# Patient Record
Sex: FEMALE | Race: WHITE | NOT HISPANIC OR LATINO | Employment: OTHER | URBAN - METROPOLITAN AREA
[De-identification: names, ages, dates, MRNs, and addresses within clinical notes are randomized per-mention and may not be internally consistent; named-entity substitution may affect disease eponyms.]

---

## 2020-05-03 ENCOUNTER — HOSPITAL ENCOUNTER (OUTPATIENT)
Facility: HOSPITAL | Age: 70
Setting detail: OBSERVATION
Discharge: ANOTHER HOSPITAL | End: 2020-05-04
Attending: EMERGENCY MEDICINE | Admitting: INTERNAL MEDICINE

## 2020-05-03 ENCOUNTER — APPOINTMENT (OUTPATIENT)
Dept: CT IMAGING | Facility: HOSPITAL | Age: 70
End: 2020-05-03

## 2020-05-03 DIAGNOSIS — Q62.11 HYDRONEPHROSIS WITH URETEROPELVIC JUNCTION (UPJ) OBSTRUCTION: ICD-10-CM

## 2020-05-03 DIAGNOSIS — N20.1 CALCULUS OF URETER: Primary | ICD-10-CM

## 2020-05-03 LAB
ALBUMIN SERPL-MCNC: 4.1 G/DL (ref 3.5–5.2)
ALBUMIN/GLOB SERPL: 1.5 G/DL
ALP SERPL-CCNC: 67 U/L (ref 39–117)
ALT SERPL W P-5'-P-CCNC: 10 U/L (ref 1–33)
ANION GAP SERPL CALCULATED.3IONS-SCNC: 12 MMOL/L (ref 5–15)
AST SERPL-CCNC: 14 U/L (ref 1–32)
BILIRUB SERPL-MCNC: 0.5 MG/DL (ref 0.2–1.2)
BUN BLD-MCNC: 14 MG/DL (ref 8–23)
BUN/CREAT SERPL: 20.6 (ref 7–25)
CALCIUM SPEC-SCNC: 9.2 MG/DL (ref 8.6–10.5)
CHLORIDE SERPL-SCNC: 106 MMOL/L (ref 98–107)
CO2 SERPL-SCNC: 26 MMOL/L (ref 22–29)
CREAT BLD-MCNC: 0.68 MG/DL (ref 0.57–1)
DEPRECATED RDW RBC AUTO: 46.4 FL (ref 37–54)
EOSINOPHIL # BLD MANUAL: 0.09 10*3/MM3 (ref 0–0.4)
EOSINOPHIL NFR BLD MANUAL: 1 % (ref 0.3–6.2)
ERYTHROCYTE [DISTWIDTH] IN BLOOD BY AUTOMATED COUNT: 14.9 % (ref 12.3–15.4)
GFR SERPL CREATININE-BSD FRML MDRD: 85 ML/MIN/1.73
GLOBULIN UR ELPH-MCNC: 2.8 GM/DL
GLUCOSE BLD-MCNC: 115 MG/DL (ref 65–99)
HCT VFR BLD AUTO: 42.1 % (ref 34–46.6)
HGB BLD-MCNC: 14.5 G/DL (ref 12–15.9)
LIPASE SERPL-CCNC: 26 U/L (ref 13–60)
LYMPHOCYTES # BLD MANUAL: 1.06 10*3/MM3 (ref 0.7–3.1)
LYMPHOCYTES NFR BLD MANUAL: 12 % (ref 19.6–45.3)
LYMPHOCYTES NFR BLD MANUAL: 7 % (ref 5–12)
MCH RBC QN AUTO: 30.5 PG (ref 26.6–33)
MCHC RBC AUTO-ENTMCNC: 34.4 G/DL (ref 31.5–35.7)
MCV RBC AUTO: 88.6 FL (ref 79–97)
MONOCYTES # BLD AUTO: 0.62 10*3/MM3 (ref 0.1–0.9)
NEUTROPHILS # BLD AUTO: 6.69 10*3/MM3 (ref 1.7–7)
NEUTROPHILS NFR BLD MANUAL: 74 % (ref 42.7–76)
NEUTS BAND NFR BLD MANUAL: 2 % (ref 0–5)
PLAT MORPH BLD: NORMAL
PLATELET # BLD AUTO: 203 10*3/MM3 (ref 140–450)
PMV BLD AUTO: 9 FL (ref 6–12)
POTASSIUM BLD-SCNC: 4.1 MMOL/L (ref 3.5–5.2)
PROT SERPL-MCNC: 6.9 G/DL (ref 6–8.5)
RBC # BLD AUTO: 4.75 10*6/MM3 (ref 3.77–5.28)
RBC MORPH BLD: NORMAL
SCAN SLIDE: NORMAL
SODIUM BLD-SCNC: 144 MMOL/L (ref 136–145)
VARIANT LYMPHS NFR BLD MANUAL: 4 % (ref 0–5)
WBC MORPH BLD: NORMAL
WBC NRBC COR # BLD: 8.8 10*3/MM3 (ref 3.4–10.8)

## 2020-05-03 PROCEDURE — 25010000002 CEFTRIAXONE PER 250 MG: Performed by: EMERGENCY MEDICINE

## 2020-05-03 PROCEDURE — 25010000002 HEPARIN (PORCINE) PER 1000 UNITS: Performed by: HOSPITALIST

## 2020-05-03 PROCEDURE — 96374 THER/PROPH/DIAG INJ IV PUSH: CPT

## 2020-05-03 PROCEDURE — G0378 HOSPITAL OBSERVATION PER HR: HCPCS

## 2020-05-03 PROCEDURE — 25010000002 MORPHINE PER 10 MG: Performed by: HOSPITALIST

## 2020-05-03 PROCEDURE — 85025 COMPLETE CBC W/AUTO DIFF WBC: CPT | Performed by: EMERGENCY MEDICINE

## 2020-05-03 PROCEDURE — 96372 THER/PROPH/DIAG INJ SC/IM: CPT

## 2020-05-03 PROCEDURE — 99219 PR INITIAL OBSERVATION CARE/DAY 50 MINUTES: CPT | Performed by: HOSPITALIST

## 2020-05-03 PROCEDURE — 25010000002 ONDANSETRON PER 1 MG: Performed by: EMERGENCY MEDICINE

## 2020-05-03 PROCEDURE — 96376 TX/PRO/DX INJ SAME DRUG ADON: CPT

## 2020-05-03 PROCEDURE — 25010000002 ONDANSETRON PER 1 MG: Performed by: HOSPITALIST

## 2020-05-03 PROCEDURE — 85007 BL SMEAR W/DIFF WBC COUNT: CPT | Performed by: EMERGENCY MEDICINE

## 2020-05-03 PROCEDURE — 25010000002 MORPHINE PER 10 MG: Performed by: EMERGENCY MEDICINE

## 2020-05-03 PROCEDURE — 96375 TX/PRO/DX INJ NEW DRUG ADDON: CPT

## 2020-05-03 PROCEDURE — 83690 ASSAY OF LIPASE: CPT | Performed by: EMERGENCY MEDICINE

## 2020-05-03 PROCEDURE — 74176 CT ABD & PELVIS W/O CONTRAST: CPT

## 2020-05-03 PROCEDURE — 99284 EMERGENCY DEPT VISIT MOD MDM: CPT

## 2020-05-03 PROCEDURE — 80053 COMPREHEN METABOLIC PANEL: CPT | Performed by: EMERGENCY MEDICINE

## 2020-05-03 RX ORDER — MORPHINE SULFATE 4 MG/ML
INJECTION, SOLUTION INTRAMUSCULAR; INTRAVENOUS
Status: DISPENSED
Start: 2020-05-03 | End: 2020-05-04

## 2020-05-03 RX ORDER — LEVOTHYROXINE SODIUM 175 UG/1
175 TABLET ORAL DAILY
Status: DISCONTINUED | OUTPATIENT
Start: 2020-05-04 | End: 2020-05-04 | Stop reason: HOSPADM

## 2020-05-03 RX ORDER — ONDANSETRON 2 MG/ML
4 INJECTION INTRAMUSCULAR; INTRAVENOUS EVERY 6 HOURS PRN
Status: DISCONTINUED | OUTPATIENT
Start: 2020-05-03 | End: 2020-05-04 | Stop reason: HOSPADM

## 2020-05-03 RX ORDER — ONDANSETRON 2 MG/ML
INJECTION INTRAMUSCULAR; INTRAVENOUS
Status: DISPENSED
Start: 2020-05-03 | End: 2020-05-04

## 2020-05-03 RX ORDER — MORPHINE SULFATE 4 MG/ML
4 INJECTION, SOLUTION INTRAMUSCULAR; INTRAVENOUS ONCE
Status: COMPLETED | OUTPATIENT
Start: 2020-05-03 | End: 2020-05-03

## 2020-05-03 RX ORDER — SODIUM CHLORIDE 0.9 % (FLUSH) 0.9 %
10 SYRINGE (ML) INJECTION AS NEEDED
Status: DISCONTINUED | OUTPATIENT
Start: 2020-05-03 | End: 2020-05-04 | Stop reason: HOSPADM

## 2020-05-03 RX ORDER — HEPARIN SODIUM 5000 [USP'U]/ML
5000 INJECTION, SOLUTION INTRAVENOUS; SUBCUTANEOUS EVERY 8 HOURS SCHEDULED
Status: DISCONTINUED | OUTPATIENT
Start: 2020-05-03 | End: 2020-05-04 | Stop reason: HOSPADM

## 2020-05-03 RX ORDER — ONDANSETRON 2 MG/ML
4 INJECTION INTRAMUSCULAR; INTRAVENOUS ONCE
Status: COMPLETED | OUTPATIENT
Start: 2020-05-03 | End: 2020-05-03

## 2020-05-03 RX ORDER — MORPHINE SULFATE 4 MG/ML
2 INJECTION, SOLUTION INTRAMUSCULAR; INTRAVENOUS
Status: DISCONTINUED | OUTPATIENT
Start: 2020-05-03 | End: 2020-05-04 | Stop reason: HOSPADM

## 2020-05-03 RX ORDER — ONDANSETRON 4 MG/1
4 TABLET, FILM COATED ORAL EVERY 6 HOURS PRN
Status: DISCONTINUED | OUTPATIENT
Start: 2020-05-03 | End: 2020-05-04 | Stop reason: HOSPADM

## 2020-05-03 RX ORDER — HYDRALAZINE HYDROCHLORIDE 20 MG/ML
20 INJECTION INTRAMUSCULAR; INTRAVENOUS EVERY 6 HOURS PRN
Status: DISCONTINUED | OUTPATIENT
Start: 2020-05-03 | End: 2020-05-04 | Stop reason: HOSPADM

## 2020-05-03 RX ORDER — DOCUSATE SODIUM 100 MG/1
100 CAPSULE, LIQUID FILLED ORAL 2 TIMES DAILY PRN
Status: DISCONTINUED | OUTPATIENT
Start: 2020-05-03 | End: 2020-05-04 | Stop reason: HOSPADM

## 2020-05-03 RX ORDER — ONDANSETRON 2 MG/ML
4 INJECTION INTRAMUSCULAR; INTRAVENOUS EVERY 6 HOURS PRN
Status: DISCONTINUED | OUTPATIENT
Start: 2020-05-03 | End: 2020-05-03

## 2020-05-03 RX ORDER — CALCIUM CARBONATE 200(500)MG
1 TABLET,CHEWABLE ORAL 2 TIMES DAILY PRN
Status: DISCONTINUED | OUTPATIENT
Start: 2020-05-03 | End: 2020-05-04 | Stop reason: HOSPADM

## 2020-05-03 RX ORDER — SODIUM CHLORIDE 0.9 % (FLUSH) 0.9 %
10 SYRINGE (ML) INJECTION EVERY 12 HOURS SCHEDULED
Status: DISCONTINUED | OUTPATIENT
Start: 2020-05-03 | End: 2020-05-04 | Stop reason: HOSPADM

## 2020-05-03 RX ORDER — ESCITALOPRAM OXALATE 10 MG/1
10 TABLET ORAL DAILY
Status: DISCONTINUED | OUTPATIENT
Start: 2020-05-04 | End: 2020-05-04 | Stop reason: HOSPADM

## 2020-05-03 RX ADMIN — SODIUM CHLORIDE 1000 ML: 900 INJECTION, SOLUTION INTRAVENOUS at 15:35

## 2020-05-03 RX ADMIN — ONDANSETRON 4 MG: 2 INJECTION INTRAMUSCULAR; INTRAVENOUS at 20:08

## 2020-05-03 RX ADMIN — Medication 10 ML: at 20:08

## 2020-05-03 RX ADMIN — ONDANSETRON 4 MG: 2 INJECTION INTRAMUSCULAR; INTRAVENOUS at 16:37

## 2020-05-03 RX ADMIN — CEFTRIAXONE SODIUM 1 G: 10 INJECTION, POWDER, FOR SOLUTION INTRAVENOUS at 17:43

## 2020-05-03 RX ADMIN — MORPHINE SULFATE 4 MG: 4 INJECTION INTRAVENOUS at 16:37

## 2020-05-03 RX ADMIN — HEPARIN SODIUM 5000 UNITS: 5000 INJECTION INTRAVENOUS; SUBCUTANEOUS at 22:38

## 2020-05-03 RX ADMIN — MORPHINE SULFATE 4 MG: 4 INJECTION INTRAVENOUS at 15:34

## 2020-05-03 RX ADMIN — MORPHINE SULFATE 2 MG: 4 INJECTION INTRAVENOUS at 20:08

## 2020-05-03 RX ADMIN — MORPHINE SULFATE 2 MG: 4 INJECTION INTRAVENOUS at 22:38

## 2020-05-03 RX ADMIN — ONDANSETRON 4 MG: 2 INJECTION INTRAMUSCULAR; INTRAVENOUS at 15:34

## 2020-05-03 NOTE — ED PROVIDER NOTES
Subjective   Chief complaint left flank pain    History of present illness 69-year-old female who complains of severe pain in her left flank that radiates to her left lower abdomen that started yesterday and is gradually gotten worse today nausea and some vomiting.  No diarrhea no black or bloody stool.  Nothing makes better nothing makes it worse constant since yesterday severe.  Sharp and stabbing in nature.  Denies any urinary problems no chest pain neck arm jaw pain or shortness of breath.  No recent ill exposures or foreign travels no leg pain or swelling.  She states he feels like previous kidney stone 10 years ago          Review of Systems   Constitutional: Negative for chills and fever.   HENT: Negative for congestion and sinus pressure.    Eyes: Negative for photophobia and visual disturbance.   Respiratory: Negative for chest tightness and shortness of breath.    Cardiovascular: Negative for chest pain and leg swelling.   Gastrointestinal: Positive for vomiting. Negative for abdominal pain.   Endocrine: Negative for cold intolerance and heat intolerance.   Genitourinary: Positive for flank pain. Negative for difficulty urinating and dysuria.   Musculoskeletal: Positive for back pain.   Skin: Negative for color change and pallor.   Neurological: Negative for dizziness and light-headedness.   Psychiatric/Behavioral: Negative for agitation and behavioral problems.       Past Medical History:   Diagnosis Date   • Arthritis     right knee, sched scope   • Bronchitis, chronic (CMS/HCC)    • CVA (cerebral infarction)     5 years ago   • Hypertension    • Hypothyroidism    • IBS (irritable bowel syndrome)    • Stroke (CMS/HCC) 12/2011       No Known Allergies    Past Surgical History:   Procedure Laterality Date   • BELPHAROPTOSIS REPAIR Bilateral 2003   • HYSTERECTOMY     • KNEE ARTHROSCOPY Right 5/20/2016    Procedure: KNEE ARTHROSCOPY debride of mmt  chondraplasty mfc/ lfc and patella;  Surgeon: Danie Crowell  MD;  Location: [facility];  Service:    • MOUTH SURGERY         Family History   Problem Relation Age of Onset   • Hypertension Father    • Cancer Sister    • Anesthesia problems Sister        Social History     Socioeconomic History   • Marital status:      Spouse name: Not on file   • Number of children: Not on file   • Years of education: Not on file   • Highest education level: Not on file   Tobacco Use   • Smoking status: Current Every Day Smoker     Packs/day: 1.00     Years: 40.00     Pack years: 40.00     Types: Cigarettes   • Smokeless tobacco: Never Used   Substance and Sexual Activity   • Alcohol use: No   • Drug use: No   • Sexual activity: Defer       Prior to Admission medications    Medication Sig Start Date End Date Taking? Authorizing Provider   amLODIPine (NORVASC) 10 MG tablet TAKE ONE TABLET BY MOUTH ONCE A DAY 6/27/16   Leandra Pettit MD   aspirin  MG EC tablet Take 1 tablet by mouth daily. 5/13/16   Linh Mackay APRN   escitalopram (LEXAPRO) 10 MG tablet TAKE ONE TABLET BY MOUTH DAILY 4/25/16   Leandra Pettit MD   fluticasone-salmeterol (ADVAIR) 250-50 MCG/DOSE DISKUS Inhale 1 puff 2 (two) times a day.  Patient taking differently: Inhale 1 puff daily. 3/28/16   Linh Mackay APRN   HYDROcodone-acetaminophen (NORCO)  MG per tablet Take 1 tablet by mouth every 6 (six) hours as needed for moderate pain (4-6). 5/19/16   Danie Crowell MD   levothyroxine (SYNTHROID, LEVOTHROID) 175 MCG tablet TAKE ONE TABLET BY MOUTH DAILY 6/27/16   Leandra Pettit MD   lisinopril (PRINIVIL,ZESTRIL) 40 MG tablet TAKE ONE TABLET BY MOUTH ONCE A DAY 6/27/16   Leandra Pettit MD   vitamin D (ERGOCALCIFEROL) 77811 UNITS capsule capsule TAKE ONE CAPSULE WEEKLY 2/15/16   Leandra Pettit MD         Objective   Physical Exam  69-year-old awake alert moderate pain nontoxic-appearing HEENT extraocular muscles intact pupils equal and reactive mouth is clear neck is supple no adenopathy no meningeal signs lungs clear  no retractions no CVA tenderness heart regular without murmur M was soft without tenderness no pulsatile masses extremities pulses are equal throughout upper and lower extremities no edema cords or Homans sign or evidence of DVT.  Patient's awake alert follows commands no facial asymmetry normal speech without focal weakness  Procedures           ED Course      Results for orders placed or performed during the hospital encounter of 05/03/20   Comprehensive Metabolic Panel   Result Value Ref Range    Glucose 115 (H) 65 - 99 mg/dL    BUN 14 8 - 23 mg/dL    Creatinine 0.68 0.57 - 1.00 mg/dL    Sodium 144 136 - 145 mmol/L    Potassium 4.1 3.5 - 5.2 mmol/L    Chloride 106 98 - 107 mmol/L    CO2 26.0 22.0 - 29.0 mmol/L    Calcium 9.2 8.6 - 10.5 mg/dL    Total Protein 6.9 6.0 - 8.5 g/dL    Albumin 4.10 3.50 - 5.20 g/dL    ALT (SGPT) 10 1 - 33 U/L    AST (SGOT) 14 1 - 32 U/L    Alkaline Phosphatase 67 39 - 117 U/L    Total Bilirubin 0.5 0.2 - 1.2 mg/dL    eGFR Non African Amer 85 >60 mL/min/1.73    Globulin 2.8 gm/dL    A/G Ratio 1.5 g/dL    BUN/Creatinine Ratio 20.6 7.0 - 25.0    Anion Gap 12.0 5.0 - 15.0 mmol/L   Lipase   Result Value Ref Range    Lipase 26 13 - 60 U/L   CBC Auto Differential   Result Value Ref Range    WBC 8.80 3.40 - 10.80 10*3/mm3    RBC 4.75 3.77 - 5.28 10*6/mm3    Hemoglobin 14.5 12.0 - 15.9 g/dL    Hematocrit 42.1 34.0 - 46.6 %    MCV 88.6 79.0 - 97.0 fL    MCH 30.5 26.6 - 33.0 pg    MCHC 34.4 31.5 - 35.7 g/dL    RDW 14.9 12.3 - 15.4 %    RDW-SD 46.4 37.0 - 54.0 fl    MPV 9.0 6.0 - 12.0 fL    Platelets 203 140 - 450 10*3/mm3   Scan Slide   Result Value Ref Range    Scan Slide     Manual Differential   Result Value Ref Range    Neutrophil % 74.0 42.7 - 76.0 %    Lymphocyte % 12.0 (L) 19.6 - 45.3 %    Monocyte % 7.0 5.0 - 12.0 %    Eosinophil % 1.0 0.3 - 6.2 %    Bands %  2.0 0.0 - 5.0 %    Atypical Lymphocyte % 4.0 0.0 - 5.0 %    Neutrophils Absolute 6.69 1.70 - 7.00 10*3/mm3    Lymphocytes Absolute  1.06 0.70 - 3.10 10*3/mm3    Monocytes Absolute 0.62 0.10 - 0.90 10*3/mm3    Eosinophils Absolute 0.09 0.00 - 0.40 10*3/mm3    RBC Morphology Normal Normal    WBC Morphology Normal Normal    Platelet Morphology Normal Normal     Ct Abdomen Pelvis Stone Protocol    Result Date: 5/3/2020  1.8 mm obstructing stone along left ureteropelvic junction, resulting in moderate to severe left hydronephrosis. 2.Bilateral nonobstructing renal stones. 3.Sigmoid diverticulosis.    Electronically Signed By-DR. Deven Tracey MD On:5/3/2020 4:10 PM This report was finalized on [id number] by DR. Deven Tracey MD.    Medications   sodium chloride 0.9 % flush 10 mL (has no administration in time range)   cefTRIAXone (ROCEPHIN) in SWFI 1 gram/10ml IV PUSH syringe (has no administration in time range)   sodium chloride 0.9 % bolus 1,000 mL (1,000 mL Intravenous New Bag 5/3/20 1535)   Morphine sulfate (PF) injection 4 mg (4 mg Intravenous Given 5/3/20 1534)   ondansetron (ZOFRAN) injection 4 mg (4 mg Intravenous Given 5/3/20 1534)   Morphine sulfate (PF) injection 4 mg (4 mg Intravenous Given 5/3/20 1637)   ondansetron (ZOFRAN) injection 4 mg (4 mg Intravenous Given 5/3/20 1637)                                            MDM  Number of Diagnoses or Management Options  Calculus of ureter:   Hydronephrosis with ureteropelvic junction (UPJ) obstruction:   Diagnosis management comments: Medical decision making.  Patient IV normal saline was given liter bolus she was given morphine for IV Zofran for IV exam evaluation CBC electrolytes pancreas liver enzymes unremarkable.  Urine is pending she required additional 4 morphine IV and for Zofran IV CT scan shows an obstructing 8 mm proximal stone left ureteropelvic junction moderate to severe hydronephrosis.  Repeat exam she was feeling better still having some pain but much improved.  Abdomen soft without tenderness no peritoneal findings or masses noted.  Patient given Rocephin 1 g IV.   She has a proximal 8 mm obstructing stone Dr. Plascencia from urology has been paged.  The patient be placed in the hospital for pain management and further evaluation.  No evidence of sepsis on clinical exam she otherwise looks nontoxic appearing.  Hospitalist paged urology paged       Amount and/or Complexity of Data Reviewed  Clinical lab tests: reviewed  Tests in the radiology section of CPT®: reviewed        Final diagnoses:   Calculus of ureter   Hydronephrosis with ureteropelvic junction (UPJ) obstruction            Pratik Young MD  05/03/20 8263

## 2020-05-03 NOTE — H&P
[facility] HOSPITALIST     Leandra Pettit MD    CHIEF COMPLAINT: Back pain    HISTORY OF PRESENT ILLNESS:  Patient is a 69-year-old female with past medical history as outlined below.  She presents here today with 1 day long period back pain.  She says she started feeling left flank pain yesterday.  Sharp acute onset.  Radiating to her left groin.  No nausea no vomiting no fevers no chills no dysuria no polyuria no blood per any orifice no abdominal pain no PND no orthopnea no chest pain no headache no other symptoms.  Prior to onset of pain she was doing fine.    ER course labs benign, CT abdomen showed a 1.8 mm obstructing stone in the left ureteropelvic junction resulting in moderate to severe left hydronephrosis.  In the ER patient was given ceftriaxone, given antibiotics, given fluids.  Urology consult was placed.    Review of systems negative apart for HPI      Past Medical History:   Diagnosis Date   • Arthritis     right knee, sched scope   • Bronchitis, chronic (CMS/HCC)    • CVA (cerebral infarction)     5 years ago   • Hypertension    • Hypothyroidism    • IBS (irritable bowel syndrome)    • Stroke (CMS/HCC) 12/2011     Past Surgical History:   Procedure Laterality Date   • BELPHAROPTOSIS REPAIR Bilateral 2003   • HYSTERECTOMY     • KNEE ARTHROSCOPY Right 5/20/2016    Procedure: KNEE ARTHROSCOPY debride of mmt  chondraplasty mfc/ lfc and patella;  Surgeon: Danie Crowell MD;  Location: [facility];  Service:    • MOUTH SURGERY       Family History   Problem Relation Age of Onset   • Hypertension Father    • Cancer Sister    • Anesthesia problems Sister      Social History     Tobacco Use   • Smoking status: Current Every Day Smoker     Packs/day: 1.00     Years: 40.00     Pack years: 40.00     Types: Cigarettes   • Smokeless tobacco: Never Used   Substance Use Topics   • Alcohol use: No   • Drug use: No       (Not in a hospital admission)  Allergies:  Patient has no known  "allergies.      There is no immunization history on file for this patient.        REVIEW OF SYSTEMS:  Please see the above history of present illness for pertinent positives and negatives.  The remainder of the patient's systems have been reviewed and are negative.    Vital Signs  Visit Vitals  /51 (BP Location: Left arm, Patient Position: Lying)   Pulse 59   Temp 98.2 °F (36.8 °C)   Resp 16   Ht 157.5 cm (62\")   Wt 104 kg (228 lb 9.9 oz)   SpO2 95%   BMI 41.81 kg/m²       Physical Exam    Physical Exam   Constitutional: She is oriented to person, place, and time. No distress.   HENT:   Head: Normocephalic and atraumatic.   Eyes: Conjunctivae and EOM are normal. Pupils are equal, round, and reactive to light.   Neck: No JVD present. No thyromegaly present.   Cardiovascular: Normal rate, regular rhythm, normal heart sounds and intact distal pulses. Exam reveals no gallop and no friction rub.   No murmur heard.  Pulmonary/Chest: Effort normal and breath sounds normal. No stridor. No respiratory distress.  She has no wheezes.  She has no rales. He exhibits no tenderness.   Abdominal: Soft. Bowel sounds are normal.  She exhibits no distension and no mass. There is no tenderness. There is no rebound and no guarding. No hernia.   Musculoskeletal: Normal range of motion.   Lymphadenopathy:     He has no cervical adenopathy.   Neurological: He is alert and oriented to person, place, and time. No cranial nerve deficit or sensory deficit. He exhibits normal muscle tone.   Skin: No rash noted.  She is not diaphoretic.   Psychiatric: She has a normal mood and affect.   Vitals reviewed.       Results Review:    I reviewed the patient's new clinical results.  Lab Results (last 24 hours)     Procedure Component Value Units Date/Time    CBC & Differential [[id number]] Collected:  05/03/20 1532    Specimen:  Blood Updated:  05/03/20 1609    Narrative:       The following orders were created for panel order CBC & " Differential.  Procedure                               Abnormality         Status                     ---------                               -----------         ------                     CBC Auto Differential[[id number]]        Normal              Final result                 Please view results for these tests on the individual orders.    CBC Auto Differential [[id number]]  (Normal) Collected:  05/03/20 1532    Specimen:  Blood Updated:  05/03/20 1609     WBC 8.80 10*3/mm3      RBC 4.75 10*6/mm3      Hemoglobin 14.5 g/dL      Hematocrit 42.1 %      MCV 88.6 fL      MCH 30.5 pg      MCHC 34.4 g/dL      RDW 14.9 %      RDW-SD 46.4 fl      MPV 9.0 fL      Platelets 203 10*3/mm3     Scan Slide [[id number]] Collected:  05/03/20 1532    Specimen:  Blood Updated:  05/03/20 1609     Scan Slide --     Comment: See Manual Differential Results       Manual Differential [[id number]]  (Abnormal) Collected:  05/03/20 1532    Specimen:  Blood Updated:  05/03/20 1609     Neutrophil % 74.0 %      Lymphocyte % 12.0 %      Monocyte % 7.0 %      Eosinophil % 1.0 %      Bands %  2.0 %      Atypical Lymphocyte % 4.0 %      Neutrophils Absolute 6.69 10*3/mm3      Lymphocytes Absolute 1.06 10*3/mm3      Monocytes Absolute 0.62 10*3/mm3      Eosinophils Absolute 0.09 10*3/mm3      RBC Morphology Normal     WBC Morphology Normal     Platelet Morphology Normal    Comprehensive Metabolic Panel [[id number]]  (Abnormal) Collected:  05/03/20 1532    Specimen:  Blood Updated:  05/03/20 1559     Glucose 115 mg/dL      BUN 14 mg/dL      Creatinine 0.68 mg/dL      Sodium 144 mmol/L      Potassium 4.1 mmol/L      Chloride 106 mmol/L      CO2 26.0 mmol/L      Calcium 9.2 mg/dL      Total Protein 6.9 g/dL      Albumin 4.10 g/dL      ALT (SGPT) 10 U/L      AST (SGOT) 14 U/L      Alkaline Phosphatase 67 U/L      Total Bilirubin 0.5 mg/dL      eGFR Non African Amer 85 mL/min/1.73      Globulin 2.8 gm/dL      A/G Ratio 1.5 g/dL      BUN/Creatinine Ratio 20.6      Anion Gap 12.0 mmol/L     Narrative:       GFR Normal >60  Chronic Kidney Disease <60  Kidney Failure <15      Lipase [[id number]]  (Normal) Collected:  05/03/20 1532    Specimen:  Blood Updated:  05/03/20 1559     Lipase 26 U/L               Assessment/Plan   Assessment and plan  Left flank pain  Due to obstructing nephrolithiasis and hydronephrosis  Continue antibiotic ceftriaxone  Continue IV fluids  N.p.o. for possible procedure  Symptomatic treatment otherwise  Urology consulted    Hypertension  Not on any meds at home, will treat with as needed hydralazine and treat pain.    Depression  Restarted Lexapro    Hypothyroidism  Restarted Synthroid    DVT PUD prophylaxis    Plan as above    Bud Montes MD  05/03/20  18:03

## 2020-05-04 VITALS
RESPIRATION RATE: 18 BRPM | SYSTOLIC BLOOD PRESSURE: 147 MMHG | TEMPERATURE: 99.1 F | OXYGEN SATURATION: 91 % | HEIGHT: 62 IN | HEART RATE: 60 BPM | BODY MASS INDEX: 42.07 KG/M2 | DIASTOLIC BLOOD PRESSURE: 82 MMHG | WEIGHT: 228.62 LBS

## 2020-05-04 LAB
ANION GAP SERPL CALCULATED.3IONS-SCNC: 11 MMOL/L (ref 5–15)
BASOPHILS # BLD AUTO: 0 10*3/MM3 (ref 0–0.2)
BASOPHILS NFR BLD AUTO: 0.5 % (ref 0–1.5)
BUN BLD-MCNC: 11 MG/DL (ref 8–23)
BUN/CREAT SERPL: 15.9 (ref 7–25)
CALCIUM SPEC-SCNC: 8.7 MG/DL (ref 8.6–10.5)
CHLORIDE SERPL-SCNC: 105 MMOL/L (ref 98–107)
CO2 SERPL-SCNC: 25 MMOL/L (ref 22–29)
CREAT BLD-MCNC: 0.69 MG/DL (ref 0.57–1)
DEPRECATED RDW RBC AUTO: 46.4 FL (ref 37–54)
EOSINOPHIL # BLD AUTO: 0 10*3/MM3 (ref 0–0.4)
EOSINOPHIL NFR BLD AUTO: 0.4 % (ref 0.3–6.2)
ERYTHROCYTE [DISTWIDTH] IN BLOOD BY AUTOMATED COUNT: 14.8 % (ref 12.3–15.4)
GFR SERPL CREATININE-BSD FRML MDRD: 84 ML/MIN/1.73
GLUCOSE BLD-MCNC: 108 MG/DL (ref 65–99)
HCT VFR BLD AUTO: 38.4 % (ref 34–46.6)
HGB BLD-MCNC: 13 G/DL (ref 12–15.9)
LYMPHOCYTES # BLD AUTO: 1.4 10*3/MM3 (ref 0.7–3.1)
LYMPHOCYTES NFR BLD AUTO: 17 % (ref 19.6–45.3)
MCH RBC QN AUTO: 29.9 PG (ref 26.6–33)
MCHC RBC AUTO-ENTMCNC: 33.9 G/DL (ref 31.5–35.7)
MCV RBC AUTO: 88.1 FL (ref 79–97)
MONOCYTES # BLD AUTO: 0.6 10*3/MM3 (ref 0.1–0.9)
MONOCYTES NFR BLD AUTO: 7.4 % (ref 5–12)
NEUTROPHILS # BLD AUTO: 6.1 10*3/MM3 (ref 1.7–7)
NEUTROPHILS NFR BLD AUTO: 74.7 % (ref 42.7–76)
NRBC BLD AUTO-RTO: 0.1 /100 WBC (ref 0–0.2)
PLATELET # BLD AUTO: 173 10*3/MM3 (ref 140–450)
PMV BLD AUTO: 9 FL (ref 6–12)
POTASSIUM BLD-SCNC: 4 MMOL/L (ref 3.5–5.2)
RBC # BLD AUTO: 4.36 10*6/MM3 (ref 3.77–5.28)
SODIUM BLD-SCNC: 141 MMOL/L (ref 136–145)
WBC NRBC COR # BLD: 8.2 10*3/MM3 (ref 3.4–10.8)

## 2020-05-04 PROCEDURE — G0378 HOSPITAL OBSERVATION PER HR: HCPCS

## 2020-05-04 PROCEDURE — 96376 TX/PRO/DX INJ SAME DRUG ADON: CPT

## 2020-05-04 PROCEDURE — 25010000002 ONDANSETRON PER 1 MG: Performed by: HOSPITALIST

## 2020-05-04 PROCEDURE — 96375 TX/PRO/DX INJ NEW DRUG ADDON: CPT

## 2020-05-04 PROCEDURE — 25010000002 HEPARIN (PORCINE) PER 1000 UNITS: Performed by: HOSPITALIST

## 2020-05-04 PROCEDURE — 25010000002 MORPHINE PER 10 MG: Performed by: HOSPITALIST

## 2020-05-04 PROCEDURE — 99217 PR OBSERVATION CARE DISCHARGE MANAGEMENT: CPT | Performed by: INTERNAL MEDICINE

## 2020-05-04 PROCEDURE — 85025 COMPLETE CBC W/AUTO DIFF WBC: CPT | Performed by: HOSPITALIST

## 2020-05-04 PROCEDURE — 25010000002 HYDROMORPHONE PER 4 MG: Performed by: UROLOGY

## 2020-05-04 PROCEDURE — 96372 THER/PROPH/DIAG INJ SC/IM: CPT

## 2020-05-04 PROCEDURE — 80048 BASIC METABOLIC PNL TOTAL CA: CPT | Performed by: HOSPITALIST

## 2020-05-04 RX ORDER — HYDROMORPHONE HCL 110MG/55ML
1 PATIENT CONTROLLED ANALGESIA SYRINGE INTRAVENOUS
Status: DISCONTINUED | OUTPATIENT
Start: 2020-05-04 | End: 2020-05-04 | Stop reason: HOSPADM

## 2020-05-04 RX ADMIN — HEPARIN SODIUM 5000 UNITS: 5000 INJECTION INTRAVENOUS; SUBCUTANEOUS at 05:37

## 2020-05-04 RX ADMIN — Medication 10 ML: at 08:39

## 2020-05-04 RX ADMIN — MORPHINE SULFATE 2 MG: 4 INJECTION INTRAVENOUS at 02:31

## 2020-05-04 RX ADMIN — ONDANSETRON 4 MG: 2 INJECTION INTRAMUSCULAR; INTRAVENOUS at 05:37

## 2020-05-04 RX ADMIN — MORPHINE SULFATE 2 MG: 4 INJECTION INTRAVENOUS at 05:37

## 2020-05-04 RX ADMIN — HYDROMORPHONE HYDROCHLORIDE 1 MG: 2 INJECTION, SOLUTION INTRAMUSCULAR; INTRAVENOUS; SUBCUTANEOUS at 09:37

## 2020-05-04 NOTE — NURSING NOTE
Sent a message to MD about patient needing DC orders to be transferred to [place] for procedure.  Still waiting to hear back.

## 2020-05-04 NOTE — PLAN OF CARE
Problem: Patient Care Overview  Goal: Plan of Care Review  Outcome: Ongoing (interventions implemented as appropriate)  Flowsheets  Taken 5/4/2020 0253  Progress: no change  Taken 5/3/2020 1924  Plan of Care Reviewed With: patient  Goal: Individualization and Mutuality  Outcome: Ongoing (interventions implemented as appropriate)  Goal: Discharge Needs Assessment  Outcome: Ongoing (interventions implemented as appropriate)  Flowsheets  Taken 5/3/2020 1924  Equipment Currently Used at Home: none  Taken 5/3/2020 1926  Transportation Anticipated: car, drives self  Patient/Family Anticipated Services at Transition: none  Patient/Family Anticipates Transition to: home  Goal: Interprofessional Rounds/Family Conf  Outcome: Ongoing (interventions implemented as appropriate)     Problem: Pain, Acute (Adult)  Goal: Identify Related Risk Factors and Signs and Symptoms  Outcome: Ongoing (interventions implemented as appropriate)  Goal: Acceptable Pain Control/Comfort Level  Outcome: Ongoing (interventions implemented as appropriate)  Flowsheets (Taken 5/4/2020 0253)  Acceptable Pain Control/Comfort Level: making progress toward outcome

## 2020-05-04 NOTE — DISCHARGE SUMMARY
[facility] Medicine Services  DISCHARGE SUMMARY        Prepared For PCP:  Leandra Pettit MD    Patient Name: Lexi Gilbert  : 1950  MRN: [id number]      Date of Admission:   5/3/2020    Date of Discharge:  2020    Length of stay:  LOS: 0 days     Hospital Course     Presenting Problem:   Calculus of ureter [N20.1]  Hydronephrosis with ureteropelvic junction (UPJ) obstruction [Q62.11]      Active Hospital Problems    Diagnosis  POA   • Calculus of ureter [N20.1]  Yes      Resolved Hospital Problems   No resolved problems to display.       Obstructing proximal left ureteral calculus  Bilateral renal calculi  Bilateral parapelvic cysts      arrange for ESWL and placement of left ureteral stent at [facility] outpatient surgery this afternoon.    Hospital Course:  Lexi Gilbert is a 69 y.o. female *  HISTORY OF PRESENT ILLNESS:  Patient is a 69-year-old female with past medical history as outlined below.  She presents here today with 1 day long period back pain.  She says she started feeling left flank pain yesterday.  Sharp acute onset.  Radiating to her left groin.  No nausea no vomiting no fevers no chills no dysuria no polyuria no blood per any orifice no abdominal pain no PND no orthopnea no chest pain no headache no other symptoms.  Prior to onset of pain she was doing fine.     ER course labs benign, CT abdomen showed a 1.8 mm obstructing stone in the left ureteropelvic junction resulting in moderate to severe left hydronephrosis.  In the ER patient was given ceftriaxone, given antibiotics, given fluids.  Urology consult was placed.     At admission patient blood pressure improved slowly I suspect this is related to pain causing her blood pressure to be elevated initially.  Patient will be going to [facility] today for ESWL per Dr. Plascencia recommendation.  We will defer antibiotics and pain management to Dr. Plascencia.         Recommendation for Outpatient Providers:        Patient would need to consider monitoring blood pressure closely at home and with primary care physician case need arises to start antihypertensive medications but not immediately needed at this time.    Reasons For Change In Medications and Indications for New Medications:        Day of Discharge     HPI:       Vital Signs:   Temp:  [98.2 °F (36.8 °C)-99.1 °F (37.3 °C)] 99.1 °F (37.3 °C)  Heart Rate:  [56-62] 60  Resp:  [16-18] 18  BP: (139-191)/() 147/82     Physical Exam:  Physical Exam  Patient is alert oriented x3 without distress  Cardiopulmonary exam and abdominal exam unremarkable    Pertinent  and/or Most Recent Results     Results from last 7 days   Lab Units 05/04/20  0334 05/03/20  1532   WBC 10*3/mm3 8.20 8.80   HEMOGLOBIN g/dL 13.0 14.5   HEMATOCRIT % 38.4 42.1   PLATELETS 10*3/mm3 173 203   SODIUM mmol/L 141 144   POTASSIUM mmol/L 4.0 4.1   CHLORIDE mmol/L 105 106   CO2 mmol/L 25.0 26.0   BUN mg/dL 11 14   CREATININE mg/dL 0.69 0.68   GLUCOSE mg/dL 108* 115*   CALCIUM mg/dL 8.7 9.2     Results from last 7 days   Lab Units 05/03/20  1532   BILIRUBIN mg/dL 0.5   ALK PHOS U/L 67   ALT (SGPT) U/L 10   AST (SGOT) U/L 14           Invalid input(s): TG, LDLCALC, LDLREALC        Brief Urine Lab Results     None          Microbiology Results Abnormal     None          Ct Abdomen Pelvis Stone Protocol    Result Date: 5/3/2020  Impression: 1.8 mm obstructing stone along left ureteropelvic junction, resulting in moderate to severe left hydronephrosis. 2.Bilateral nonobstructing renal stones. 3.Sigmoid diverticulosis.    Electronically Signed By-DR. Deven Tracey MD On:5/3/2020 4:10 PM This report was finalized on [id number] by DR. Deven Tracey MD.                             Test Results Pending at Discharge        Procedures Performed           Consults:   Consults     Date and Time Order Name Status Description    5/3/2020 1934 Inpatient Urology Consult Completed     5/3/2020 4994  Hospitalist (on-call MD unless specified) Completed     5/3/2020 1407 Urology (on-call MD unless specified) Completed             Discharge Details        Discharge Medications      Continue These Medications      Instructions Start Date   escitalopram 10 MG tablet  Commonly known as:  LEXAPRO   TAKE ONE TABLET BY MOUTH DAILY      levothyroxine 175 MCG tablet  Commonly known as:  SYNTHROID, LEVOTHROID   TAKE ONE TABLET BY MOUTH DAILY             No Known Allergies      Discharge Disposition:  Another Health Care Institution Not Defined    Diet:  Hospital:  Diet Order   Procedures   • NPO Diet         Discharge Activity:         CODE STATUS:    Code Status and Medical Interventions:   Ordered at: 05/03/20 5816     Level Of Support Discussed With:    Patient     Code Status:    CPR     Medical Interventions (Level of Support Prior to Arrest):    Full         Follow-up Appointments  No future appointments.          Condition on Discharge:      Stable      This patient has been examined wearing appropriate Personal Protective Equipment and discussed with RN 05/04/20      Electronically signed by Yadiel Gutierrez MD, 05/04/20, 9:31 AM.      Time: I spent40 minutes on this discharge activity which included face-to-face encounter with the patient/reviewing the data in the system/coordination of the care with the nursing staff as well as consultants/documentation/entering orders.

## 2020-05-04 NOTE — CONSULTS
Urology Consult Note    Patient:Lexi Gilbert :1950  Room:[facility]  Admit Date5/3/2020  Age:69 y.o.     SEX:female     DOS:2020     MR:[id number]     Visit:[id number]       Attending: Bud Montes MD  Referring Provider: Dr Montes  Reason for Consultation: Left ureteral calculus    Patient Care Team:  Leandra Pettit MD as PCP - General (Family Medicine)    Chief complaint left flank pain    Subjective .     History of present illness: 69-year-old woman with 1 day history of severe left flank pain.  Is 10 out of 10 at its worst.  Does respond to IV narcotics and improved.  She has had some associated nausea and vomiting.  Patient denies any fevers or chills.    CT scan shows an 8 mm stone that is causing obstruction at the left ureteropelvic junction.  There is also 1-2 small stones in each kidney.  There are multiple bilateral parapelvic cysts.    Patient has a remote history of stones x2.  These were both treated with shockwave lithotripsy with good results.    Review of Systems  10 point review of systems were reviewed and are negative except for:  Constitution:  positive for See HPI    History  Past Medical History:   Diagnosis Date   • Arthritis     right knee, sched scope   • Bronchitis, chronic (CMS/HCC)    • CVA (cerebral infarction)     5 years ago   • Hypertension    • Hypothyroidism    • IBS (irritable bowel syndrome)    • Stroke (CMS/HCC) 2011     Past Surgical History:   Procedure Laterality Date   • BELPHAROPTOSIS REPAIR Bilateral    • HYSTERECTOMY     • KNEE ARTHROSCOPY Right 2016    Procedure: KNEE ARTHROSCOPY debride of mmt  chondraplasty mfc/ lfc and patella;  Surgeon: Danie Crowell MD;  Location: [facility];  Service:    • MOUTH SURGERY       Social History     Socioeconomic History   • Marital status:      Spouse name: Not on file   • Number of children: Not on file   • Years of education: Not on file   • Highest education level: Not on file   Tobacco Use   • Smoking  status: Current Every Day Smoker     Packs/day: 1.00     Years: 40.00     Pack years: 40.00     Types: Cigarettes   • Smokeless tobacco: Never Used   Substance and Sexual Activity   • Alcohol use: No   • Drug use: No   • Sexual activity: Defer     Family History   Problem Relation Age of Onset   • Hypertension Father    • Cancer Sister    • Anesthesia problems Sister      Allergy  No Known Allergies  Prior to Admission medications    Medication Sig Start Date End Date Taking? Authorizing Provider   escitalopram (LEXAPRO) 10 MG tablet TAKE ONE TABLET BY MOUTH DAILY 16  Yes Leandra Pettit MD   levothyroxine (SYNTHROID, LEVOTHROID) 175 MCG tablet TAKE ONE TABLET BY MOUTH DAILY 16  Yes Leandra Pettit MD         Objective     tMax 24 hours:  Temp (24hrs), Av.5 °F (36.9 °C), Min:98.2 °F (36.8 °C), Max:99.1 °F (37.3 °C)    Vital Sign Ranges:  Temp:  [98.2 °F (36.8 °C)-99.1 °F (37.3 °C)] 99.1 °F (37.3 °C)  Heart Rate:  [56-62] 60  Resp:  [16-18] 18  BP: (139-191)/() 147/82  Intake and Output Last 3 Shifts:  No intake/output data recorded.      Physical Exam:   General Appearance: alert, appears stated age and cooperative  Head: normocephalic, without obvious abnormality and atraumatic  Eyes: lids and lashes normal, conjunctivae and sclerae normal, no icterus, no pallor and corneas clear  Lungs: respirations regular, respirations even and respirations unlabored  Heart: regular rhythm & normal rate  Abdomen: no guarding, no rebound tenderness and Left CVA tenderness  Extremities: moves extremities well, no edema, no cyanosis and no redness  Skin: no bleeding, bruising or rash  Neurologic: Mental Status orientated to person, place, time and situation    Results Review:     Lab Results (last 24 hours)     Procedure Component Value Units Date/Time    Basic Metabolic Panel [[id number]]  (Abnormal) Collected:  20 0334    Specimen:  Blood Updated:  20     Glucose 108 mg/dL      BUN 11 mg/dL       Creatinine 0.69 mg/dL      Sodium 141 mmol/L      Potassium 4.0 mmol/L      Chloride 105 mmol/L      CO2 25.0 mmol/L      Calcium 8.7 mg/dL      eGFR Non African Amer 84 mL/min/1.73      BUN/Creatinine Ratio 15.9     Anion Gap 11.0 mmol/L     Narrative:       GFR Normal >60  Chronic Kidney Disease <60  Kidney Failure <15      CBC & Differential [[id number]] Collected:  05/04/20 0334    Specimen:  Blood Updated:  05/04/20 0505    Narrative:       The following orders were created for panel order CBC & Differential.  Procedure                               Abnormality         Status                     ---------                               -----------         ------                     CBC Auto Differential[[id number]]        Abnormal            Final result                 Please view results for these tests on the individual orders.    CBC Auto Differential [[id number]]  (Abnormal) Collected:  05/04/20 0334    Specimen:  Blood Updated:  05/04/20 0505     WBC 8.20 10*3/mm3      RBC 4.36 10*6/mm3      Hemoglobin 13.0 g/dL      Hematocrit 38.4 %      MCV 88.1 fL      MCH 29.9 pg      MCHC 33.9 g/dL      RDW 14.8 %      RDW-SD 46.4 fl      MPV 9.0 fL      Platelets 173 10*3/mm3      Neutrophil % 74.7 %      Lymphocyte % 17.0 %      Monocyte % 7.4 %      Eosinophil % 0.4 %      Basophil % 0.5 %      Neutrophils, Absolute 6.10 10*3/mm3      Lymphocytes, Absolute 1.40 10*3/mm3      Monocytes, Absolute 0.60 10*3/mm3      Eosinophils, Absolute 0.00 10*3/mm3      Basophils, Absolute 0.00 10*3/mm3      nRBC 0.1 /100 WBC     CBC & Differential [[id number]] Collected:  05/03/20 1532    Specimen:  Blood Updated:  05/03/20 1609    Narrative:       The following orders were created for panel order CBC & Differential.  Procedure                               Abnormality         Status                     ---------                               -----------         ------                     CBC Auto Differential[[id number]]         Normal              Final result                 Please view results for these tests on the individual orders.    CBC Auto Differential [[id number]]  (Normal) Collected:  05/03/20 1532    Specimen:  Blood Updated:  05/03/20 1609     WBC 8.80 10*3/mm3      RBC 4.75 10*6/mm3      Hemoglobin 14.5 g/dL      Hematocrit 42.1 %      MCV 88.6 fL      MCH 30.5 pg      MCHC 34.4 g/dL      RDW 14.9 %      RDW-SD 46.4 fl      MPV 9.0 fL      Platelets 203 10*3/mm3     Scan Slide [[id number]] Collected:  05/03/20 1532    Specimen:  Blood Updated:  05/03/20 1609     Scan Slide --     Comment: See Manual Differential Results       Manual Differential [[id number]]  (Abnormal) Collected:  05/03/20 1532    Specimen:  Blood Updated:  05/03/20 1609     Neutrophil % 74.0 %      Lymphocyte % 12.0 %      Monocyte % 7.0 %      Eosinophil % 1.0 %      Bands %  2.0 %      Atypical Lymphocyte % 4.0 %      Neutrophils Absolute 6.69 10*3/mm3      Lymphocytes Absolute 1.06 10*3/mm3      Monocytes Absolute 0.62 10*3/mm3      Eosinophils Absolute 0.09 10*3/mm3      RBC Morphology Normal     WBC Morphology Normal     Platelet Morphology Normal    Comprehensive Metabolic Panel [[id number]]  (Abnormal) Collected:  05/03/20 1532    Specimen:  Blood Updated:  05/03/20 1559     Glucose 115 mg/dL      BUN 14 mg/dL      Creatinine 0.68 mg/dL      Sodium 144 mmol/L      Potassium 4.1 mmol/L      Chloride 106 mmol/L      CO2 26.0 mmol/L      Calcium 9.2 mg/dL      Total Protein 6.9 g/dL      Albumin 4.10 g/dL      ALT (SGPT) 10 U/L      AST (SGOT) 14 U/L      Alkaline Phosphatase 67 U/L      Total Bilirubin 0.5 mg/dL      eGFR Non African Amer 85 mL/min/1.73      Globulin 2.8 gm/dL      A/G Ratio 1.5 g/dL      BUN/Creatinine Ratio 20.6     Anion Gap 12.0 mmol/L     Narrative:       GFR Normal >60  Chronic Kidney Disease <60  Kidney Failure <15      Lipase [[id number]]  (Normal) Collected:  05/03/20 1532    Specimen:  Blood Updated:  05/03/20 1559     Lipase 26  U/L          No results found for: URINECX     Imaging Results (Last 7 Days)     Procedure Component Value Units Date/Time    CT Abdomen Pelvis Stone Protocol [[id number]] Collected:  05/03/20 1605     Updated:  05/03/20 1613    Narrative:       CT ABDOMEN PELVIS STONE PROTOCOL-     Date of Exam: 5/3/2020 3:50 PM     Indication: Flank pain, stone disease suspected.     Comparison: None available.     Technique: CT scan of the abdomen and pelvis without IV contrast.  Automated exposure control and iterative reconstruction methods were  used.     FINDINGS:  The lung bases are clear.     There is a small cyst within the mid liver. The unenhanced gallbladder,  adrenal glands, spleen, and pancreas are unremarkable. There are  bilateral nonobstructing renal stones with bilateral renal parapelvic  cysts. There is an 8 mm obstructing stone at the left ureteropelvic  junction, resulting in moderate to severe left hydronephrosis.     The stomach appears normal. The small bowel appears normal in caliber  and configuration. There is sigmoid diverticulosis. The appendix is not  well-visualized. There is no ascites or loculated collection. No  abnormally enlarged lymph nodes are identified.     The rectum and urinary bladder are unremarkable. The uterus is  surgically absent.     No aggressive osseous lesions are identified.       Impression:       1.8 mm obstructing stone along left ureteropelvic junction, resulting in  moderate to severe left hydronephrosis.  2.Bilateral nonobstructing renal stones.  3.Sigmoid diverticulosis.           Electronically Signed By-DR. Deven Tracey MD On:5/3/2020 4:10 PM  This report was finalized on [id number] by DR. Deven Tracey MD.          Inpatient Meds:   Scheduled Meds:  cefTRIAXone 1 g Intravenous Q24H   escitalopram 10 mg Oral Daily   heparin (porcine) 5,000 Units Subcutaneous Q8H   levothyroxine 175 mcg Oral Daily   sodium chloride 10 mL Intravenous Q12H      Continuous  Infusions:    PRN Meds:.•  calcium carbonate  •  docusate sodium  •  hydrALAZINE  •  HYDROmorphone  •  Morphine  •  ondansetron **OR** ondansetron  •  [COMPLETED] Insert peripheral IV **AND** sodium chloride  •  sodium chloride      Assessment/Plan     Active Problems:    Calculus of ureter    Obstructing proximal left ureteral calculus  Bilateral renal calculi  Bilateral parapelvic cysts    Plan  We will arrange for ESWL and placement of left ureteral stent at [facility] outpatient surgery this afternoon.  Risks, benefits, and alternatives discussed with the patient and she wishes to proceed.      I discussed the patients findings and my recommendations with patient and nursing staff    Thank you for this  consult    Deejay Plascencia MD  05/04/20  06:16

## 2020-05-20 ENCOUNTER — HOSPITAL ENCOUNTER (OUTPATIENT)
Dept: GENERAL RADIOLOGY | Facility: HOSPITAL | Age: 70
Discharge: HOME | End: 2020-05-20
Admitting: UROLOGY

## 2020-05-20 ENCOUNTER — TRANSCRIBE ORDERS (OUTPATIENT)
Dept: ADMINISTRATIVE | Facility: HOSPITAL | Age: 70
End: 2020-05-20

## 2020-05-20 DIAGNOSIS — N20.0 CALCULUS, RENAL: ICD-10-CM

## 2020-05-20 DIAGNOSIS — N20.0 CALCULUS, RENAL: Primary | ICD-10-CM

## 2020-05-20 PROCEDURE — 74018 RADEX ABDOMEN 1 VIEW: CPT

## 2022-07-20 ENCOUNTER — OFFICE (OUTPATIENT)
Dept: URBAN - METROPOLITAN AREA CLINIC 64 | Facility: CLINIC | Age: 72
End: 2022-07-20

## 2022-07-20 VITALS
DIASTOLIC BLOOD PRESSURE: 108 MMHG | HEART RATE: 68 BPM | SYSTOLIC BLOOD PRESSURE: 197 MMHG | HEIGHT: 62 IN | WEIGHT: 230 LBS

## 2022-07-20 DIAGNOSIS — R19.7 DIARRHEA, UNSPECIFIED: ICD-10-CM

## 2022-07-20 DIAGNOSIS — R15.2 FECAL URGENCY: ICD-10-CM

## 2022-07-20 DIAGNOSIS — R10.30 LOWER ABDOMINAL PAIN, UNSPECIFIED: ICD-10-CM

## 2022-07-20 PROCEDURE — 99204 OFFICE O/P NEW MOD 45 MIN: CPT | Performed by: INTERNAL MEDICINE

## 2022-07-20 RX ORDER — COLESTIPOL HYDROCHLORIDE 1 G/1
2 TABLET, FILM COATED ORAL
Qty: 60 | Refills: 2 | Status: ACTIVE
Start: 2022-07-20

## 2022-07-22 ENCOUNTER — OFFICE (OUTPATIENT)
Dept: URBAN - METROPOLITAN AREA LAB 2 | Facility: LAB | Age: 72
End: 2022-07-22

## 2022-07-22 DIAGNOSIS — R19.7 DIARRHEA, UNSPECIFIED: ICD-10-CM

## 2022-07-22 PROCEDURE — 83630 LACTOFERRIN FECAL (QUAL): CPT | Performed by: INTERNAL MEDICINE

## 2022-07-22 PROCEDURE — 87505 NFCT AGENT DETECTION GI: CPT | Performed by: INTERNAL MEDICINE

## 2022-09-23 ENCOUNTER — OFFICE (OUTPATIENT)
Dept: URBAN - METROPOLITAN AREA CLINIC 64 | Facility: CLINIC | Age: 72
End: 2022-09-23

## 2022-09-23 VITALS
WEIGHT: 230 LBS | SYSTOLIC BLOOD PRESSURE: 157 MMHG | HEIGHT: 62 IN | HEART RATE: 58 BPM | DIASTOLIC BLOOD PRESSURE: 90 MMHG

## 2022-09-23 DIAGNOSIS — R19.7 DIARRHEA, UNSPECIFIED: ICD-10-CM

## 2022-09-23 DIAGNOSIS — R15.2 FECAL URGENCY: ICD-10-CM

## 2022-09-23 DIAGNOSIS — R10.30 LOWER ABDOMINAL PAIN, UNSPECIFIED: ICD-10-CM

## 2022-09-23 PROCEDURE — 99214 OFFICE O/P EST MOD 30 MIN: CPT | Performed by: NURSE PRACTITIONER

## 2022-09-23 RX ORDER — DICYCLOMINE HYDROCHLORIDE 20 MG/1
40 TABLET ORAL
Qty: 60 | Refills: 11 | Status: ACTIVE
Start: 2022-09-23

## 2025-03-17 ENCOUNTER — APPOINTMENT (OUTPATIENT)
Dept: CT IMAGING | Facility: HOSPITAL | Age: 75
End: 2025-03-17
Payer: MEDICARE

## 2025-03-17 ENCOUNTER — APPOINTMENT (OUTPATIENT)
Dept: GENERAL RADIOLOGY | Facility: HOSPITAL | Age: 75
End: 2025-03-17
Payer: MEDICARE

## 2025-03-17 ENCOUNTER — APPOINTMENT (OUTPATIENT)
Dept: CARDIOLOGY | Facility: HOSPITAL | Age: 75
End: 2025-03-17
Payer: MEDICARE

## 2025-03-17 ENCOUNTER — HOSPITAL ENCOUNTER (INPATIENT)
Facility: HOSPITAL | Age: 75
LOS: 8 days | Discharge: HOME | End: 2025-03-26
Attending: EMERGENCY MEDICINE | Admitting: STUDENT IN AN ORGANIZED HEALTH CARE EDUCATION/TRAINING PROGRAM
Payer: MEDICARE

## 2025-03-17 ENCOUNTER — APPOINTMENT (OUTPATIENT)
Dept: MRI IMAGING | Facility: HOSPITAL | Age: 75
End: 2025-03-17
Payer: MEDICARE

## 2025-03-17 DIAGNOSIS — I49.9 CARDIAC ARRHYTHMIA, UNSPECIFIED: ICD-10-CM
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
## 2025-03-17 DIAGNOSIS — I10 ACCELERATED HYPERTENSION: ICD-10-CM

## 2025-03-17 DIAGNOSIS — R53.1 WEAKNESS: ICD-10-CM

## 2025-03-17 DIAGNOSIS — I63.81 THALAMIC STROKE: Primary | ICD-10-CM

## 2025-03-17 LAB
ALBUMIN SERPL-MCNC: 4.1 G/DL (ref 3.5–5.2)
ALBUMIN/GLOB SERPL: 1.6 G/DL
ALP SERPL-CCNC: 81 U/L (ref 39–117)
ALT SERPL W P-5'-P-CCNC: 11 U/L (ref 1–33)
ANION GAP SERPL CALCULATED.3IONS-SCNC: 10.9 MMOL/L (ref 5–15)
ANION GAP SERPL CALCULATED.3IONS-SCNC: 11.5 MMOL/L (ref 5–15)
ANION GAP SERPL CALCULATED.3IONS-SCNC: 9.7 MMOL/L (ref 5–15)
AST SERPL-CCNC: 19 U/L (ref 1–32)
AV MEAN PRESS GRAD SYS DOP V1V2: 6.8 MMHG
AV VMAX SYS DOP: 181.1 CM/SEC
BASOPHILS # BLD AUTO: 0.03 10*3/MM3 (ref 0–0.2)
BASOPHILS NFR BLD AUTO: 0.3 % (ref 0–1.5)
BH CV ECHO LEFT VENTRICLE GLOBAL LONGITUDINAL STRAIN: -13.5 %
BH CV ECHO MEAS - AI P1/2T: 428.5 MSEC
BH CV ECHO MEAS - AO MAX PG: 13.1 MMHG
BH CV ECHO MEAS - AO V2 VTI: 38 CM
BH CV ECHO MEAS - EDV(CUBED): 131.7 ML
BH CV ECHO MEAS - EDV(MOD-SP4): 98.6 ML
BH CV ECHO MEAS - EF(MOD-SP4): 60 %
BH CV ECHO MEAS - ESV(CUBED): 47.8 ML
BH CV ECHO MEAS - ESV(MOD-SP4): 39.4 ML
BH CV ECHO MEAS - FS: 28.7 %
BH CV ECHO MEAS - IVS/LVPW: 1.03 CM
BH CV ECHO MEAS - IVSD: 1.25 CM
BH CV ECHO MEAS - LA DIMENSION: 4.7 CM
BH CV ECHO MEAS - LAT PEAK E' VEL: 9.9 CM/SEC
BH CV ECHO MEAS - LV DIASTOLIC VOL/BSA (35-75): 47.5 CM2
BH CV ECHO MEAS - LV MASS(C)D: 249.9 GRAMS
BH CV ECHO MEAS - LV SYSTOLIC VOL/BSA (12-30): 19 CM2
BH CV ECHO MEAS - LVIDD: 5.1 CM
BH CV ECHO MEAS - LVIDS: 3.6 CM
BH CV ECHO MEAS - LVOT AREA: 3.3 CM2
BH CV ECHO MEAS - LVOT DIAM: 2.05 CM
BH CV ECHO MEAS - LVPWD: 1.21 CM
BH CV ECHO MEAS - MED PEAK E' VEL: 4 CM/SEC
BH CV ECHO MEAS - MR MAX PG: 39 MMHG
BH CV ECHO MEAS - MR MAX VEL: 312.3 CM/SEC
BH CV ECHO MEAS - MR MEAN PG: 30.2 MMHG
BH CV ECHO MEAS - MR MEAN VEL: 268.8 CM/SEC
BH CV ECHO MEAS - MR VTI: 137.2 CM
BH CV ECHO MEAS - MV A MAX VEL: 111.1 CM/SEC
BH CV ECHO MEAS - MV DEC SLOPE: 138.4 CM/SEC2
BH CV ECHO MEAS - MV DEC TIME: 0.32 SEC
BH CV ECHO MEAS - MV E MAX VEL: 57.6 CM/SEC
BH CV ECHO MEAS - MV E/A: 0.52
BH CV ECHO MEAS - MV MAX PG: 5.6 MMHG
BH CV ECHO MEAS - MV MEAN PG: 1.89 MMHG
BH CV ECHO MEAS - MV P1/2T: 123.8 MSEC
BH CV ECHO MEAS - MV V2 VTI: 27.2 CM
BH CV ECHO MEAS - MVA(P1/2T): 1.78 CM2
BH CV ECHO MEAS - PA ACC TIME: 0.09 SEC
BH CV ECHO MEAS - PA V2 MAX: 166.8 CM/SEC
BH CV ECHO MEAS - RAP SYSTOLE: 3 MMHG
BH CV ECHO MEAS - RV MAX PG: 6.2 MMHG
BH CV ECHO MEAS - RV V1 MAX: 124.7 CM/SEC
BH CV ECHO MEAS - RV V1 VTI: 28.4 CM
BH CV ECHO MEAS - RVSP: 12.7 MMHG
BH CV ECHO MEAS - SV(MOD-SP4): 59.2 ML
BH CV ECHO MEAS - SVI(MOD-SP4): 28.5 ML/M2
BH CV ECHO MEAS - TAPSE (>1.6): 2.14 CM
BH CV ECHO MEAS - TR MAX PG: 9.7 MMHG
BH CV ECHO MEAS - TR MAX VEL: 155.4 CM/SEC
BH CV ECHO MEASUREMENTS AVERAGE E/E' RATIO: 8.29
BH CV XLRA - RV BASE: 2.9 CM
BH CV XLRA - RV LENGTH: 7.7 CM
BH CV XLRA - RV MID: 2.05 CM
BH CV XLRA - TDI S': 23.3 CM/SEC
BILIRUB SERPL-MCNC: 0.3 MG/DL (ref 0–1.2)
BUN SERPL-MCNC: 15 MG/DL (ref 8–23)
BUN SERPL-MCNC: 19 MG/DL (ref 8–23)
BUN SERPL-MCNC: 19 MG/DL (ref 8–23)
BUN/CREAT SERPL: 23.4 (ref 7–25)
BUN/CREAT SERPL: 27.1 (ref 7–25)
BUN/CREAT SERPL: 28.4 (ref 7–25)
CALCIUM SPEC-SCNC: 9.5 MG/DL (ref 8.6–10.5)
CALCIUM SPEC-SCNC: 9.6 MG/DL (ref 8.6–10.5)
CALCIUM SPEC-SCNC: 9.8 MG/DL (ref 8.6–10.5)
CHLORIDE SERPL-SCNC: 107 MMOL/L (ref 98–107)
CO2 SERPL-SCNC: 23.5 MMOL/L (ref 22–29)
CO2 SERPL-SCNC: 24.1 MMOL/L (ref 22–29)
CO2 SERPL-SCNC: 24.3 MMOL/L (ref 22–29)
CREAT SERPL-MCNC: 0.64 MG/DL (ref 0.57–1)
CREAT SERPL-MCNC: 0.67 MG/DL (ref 0.57–1)
CREAT SERPL-MCNC: 0.7 MG/DL (ref 0.57–1)
DEPRECATED RDW RBC AUTO: 45.5 FL (ref 37–54)
DEPRECATED RDW RBC AUTO: 46.7 FL (ref 37–54)
EGFRCR SERPLBLD CKD-EPI 2021: 89.8 ML/MIN/1.73
EGFRCR SERPLBLD CKD-EPI 2021: 90.7 ML/MIN/1.73
EGFRCR SERPLBLD CKD-EPI 2021: 91.7 ML/MIN/1.73
EOSINOPHIL # BLD AUTO: 0.16 10*3/MM3 (ref 0–0.4)
EOSINOPHIL NFR BLD AUTO: 1.8 % (ref 0.3–6.2)
ERYTHROCYTE [DISTWIDTH] IN BLOOD BY AUTOMATED COUNT: 13.2 % (ref 12.3–15.4)
ERYTHROCYTE [DISTWIDTH] IN BLOOD BY AUTOMATED COUNT: 13.7 % (ref 12.3–15.4)
GEN 5 1HR TROPONIN T REFLEX: 31 NG/L
GLOBULIN UR ELPH-MCNC: 2.6 GM/DL
GLUCOSE BLDC GLUCOMTR-MCNC: 128 MG/DL (ref 70–105)
GLUCOSE SERPL-MCNC: 109 MG/DL (ref 65–99)
GLUCOSE SERPL-MCNC: 118 MG/DL (ref 65–99)
GLUCOSE SERPL-MCNC: 134 MG/DL (ref 65–99)
HCT VFR BLD AUTO: 44.7 % (ref 34–46.6)
HCT VFR BLD AUTO: 47.7 % (ref 34–46.6)
HGB BLD-MCNC: 14 G/DL (ref 12–15.9)
HGB BLD-MCNC: 15 G/DL (ref 12–15.9)
HOLD SPECIMEN: NORMAL
IMM GRANULOCYTES # BLD AUTO: 0.02 10*3/MM3 (ref 0–0.05)
IMM GRANULOCYTES NFR BLD AUTO: 0.2 % (ref 0–0.5)
LEFT ATRIUM VOLUME INDEX: 24.2 ML/M2
LV EF BIPLANE MOD: 60 %
LYMPHOCYTES # BLD AUTO: 1.37 10*3/MM3 (ref 0.7–3.1)
LYMPHOCYTES NFR BLD AUTO: 15.5 % (ref 19.6–45.3)
MCH RBC QN AUTO: 28.9 PG (ref 26.6–33)
MCH RBC QN AUTO: 29.2 PG (ref 26.6–33)
MCHC RBC AUTO-ENTMCNC: 31.3 G/DL (ref 31.5–35.7)
MCHC RBC AUTO-ENTMCNC: 31.4 G/DL (ref 31.5–35.7)
MCV RBC AUTO: 92.2 FL (ref 79–97)
MCV RBC AUTO: 93 FL (ref 79–97)
MONOCYTES # BLD AUTO: 0.49 10*3/MM3 (ref 0.1–0.9)
MONOCYTES NFR BLD AUTO: 5.5 % (ref 5–12)
NEUTROPHILS NFR BLD AUTO: 6.76 10*3/MM3 (ref 1.7–7)
NEUTROPHILS NFR BLD AUTO: 76.7 % (ref 42.7–76)
NRBC BLD AUTO-RTO: 0 /100 WBC (ref 0–0.2)
PLATELET # BLD AUTO: 245 10*3/MM3 (ref 140–450)
PLATELET # BLD AUTO: 253 10*3/MM3 (ref 140–450)
PMV BLD AUTO: 10.2 FL (ref 6–12)
PMV BLD AUTO: 10.5 FL (ref 6–12)
POTASSIUM SERPL-SCNC: 4 MMOL/L (ref 3.5–5.2)
POTASSIUM SERPL-SCNC: 4.1 MMOL/L (ref 3.5–5.2)
POTASSIUM SERPL-SCNC: 4.3 MMOL/L (ref 3.5–5.2)
PROT SERPL-MCNC: 6.7 G/DL (ref 6–8.5)
QT INTERVAL: 409 MS
QTC INTERVAL: 496 MS
RBC # BLD AUTO: 4.85 10*6/MM3 (ref 3.77–5.28)
RBC # BLD AUTO: 5.13 10*6/MM3 (ref 3.77–5.28)
SINUS: 3.1 CM
SODIUM SERPL-SCNC: 141 MMOL/L (ref 136–145)
SODIUM SERPL-SCNC: 142 MMOL/L (ref 136–145)
SODIUM SERPL-SCNC: 142 MMOL/L (ref 136–145)
STJ: 2.04 CM
TROPONIN T % DELTA: 48
TROPONIN T NUMERIC DELTA: 10 NG/L
TROPONIN T SERPL HS-MCNC: 21 NG/L
TROPONIN T SERPL HS-MCNC: 31 NG/L
TSH SERPL DL<=0.05 MIU/L-ACNC: 1.65 UIU/ML (ref 0.27–4.2)
WBC NRBC COR # BLD AUTO: 8.83 10*3/MM3 (ref 3.4–10.8)
WBC NRBC COR # BLD AUTO: 9.59 10*3/MM3 (ref 3.4–10.8)
WHOLE BLOOD HOLD COAG: NORMAL

## 2025-03-17 PROCEDURE — 84443 ASSAY THYROID STIM HORMONE: CPT

## 2025-03-17 PROCEDURE — 70496 CT ANGIOGRAPHY HEAD: CPT

## 2025-03-17 PROCEDURE — G0378 HOSPITAL OBSERVATION PER HR: HCPCS

## 2025-03-17 PROCEDURE — 84484 ASSAY OF TROPONIN QUANT: CPT | Performed by: EMERGENCY MEDICINE

## 2025-03-17 PROCEDURE — 70551 MRI BRAIN STEM W/O DYE: CPT

## 2025-03-17 PROCEDURE — 71045 X-RAY EXAM CHEST 1 VIEW: CPT

## 2025-03-17 PROCEDURE — 25510000001 IOPAMIDOL PER 1 ML: Performed by: FAMILY MEDICINE

## 2025-03-17 PROCEDURE — 94640 AIRWAY INHALATION TREATMENT: CPT

## 2025-03-17 PROCEDURE — 82948 REAGENT STRIP/BLOOD GLUCOSE: CPT

## 2025-03-17 PROCEDURE — 99223 1ST HOSP IP/OBS HIGH 75: CPT | Performed by: STUDENT IN AN ORGANIZED HEALTH CARE EDUCATION/TRAINING PROGRAM

## 2025-03-17 PROCEDURE — 93306 TTE W/DOPPLER COMPLETE: CPT

## 2025-03-17 PROCEDURE — 25010000003 LABETALOL 5 MG/ML SOLUTION

## 2025-03-17 PROCEDURE — 99285 EMERGENCY DEPT VISIT HI MDM: CPT

## 2025-03-17 PROCEDURE — 93356 MYOCRD STRAIN IMG SPCKL TRCK: CPT | Performed by: INTERNAL MEDICINE

## 2025-03-17 PROCEDURE — 93306 TTE W/DOPPLER COMPLETE: CPT | Performed by: INTERNAL MEDICINE

## 2025-03-17 PROCEDURE — 25010000002 LORAZEPAM PER 2 MG

## 2025-03-17 PROCEDURE — 36415 COLL VENOUS BLD VENIPUNCTURE: CPT

## 2025-03-17 PROCEDURE — 85025 COMPLETE CBC W/AUTO DIFF WBC: CPT | Performed by: EMERGENCY MEDICINE

## 2025-03-17 PROCEDURE — 85027 COMPLETE CBC AUTOMATED: CPT

## 2025-03-17 PROCEDURE — 93005 ELECTROCARDIOGRAM TRACING: CPT | Performed by: EMERGENCY MEDICINE

## 2025-03-17 PROCEDURE — 80053 COMPREHEN METABOLIC PANEL: CPT | Performed by: EMERGENCY MEDICINE

## 2025-03-17 PROCEDURE — 70498 CT ANGIOGRAPHY NECK: CPT

## 2025-03-17 PROCEDURE — 93356 MYOCRD STRAIN IMG SPCKL TRCK: CPT

## 2025-03-17 PROCEDURE — 70450 CT HEAD/BRAIN W/O DYE: CPT

## 2025-03-17 PROCEDURE — 84484 ASSAY OF TROPONIN QUANT: CPT

## 2025-03-17 RX ORDER — CLOPIDOGREL BISULFATE 75 MG/1
75 TABLET ORAL DAILY
Status: DISCONTINUED | OUTPATIENT
Start: 2025-03-18 | End: 2025-03-26 | Stop reason: HOSPADM

## 2025-03-17 RX ORDER — ESCITALOPRAM OXALATE 10 MG/1
20 TABLET ORAL DAILY
Status: DISCONTINUED | OUTPATIENT
Start: 2025-03-17 | End: 2025-03-26 | Stop reason: HOSPADM

## 2025-03-17 RX ORDER — LABETALOL HYDROCHLORIDE 5 MG/ML
10 INJECTION, SOLUTION INTRAVENOUS EVERY 4 HOURS PRN
Status: DISCONTINUED | OUTPATIENT
Start: 2025-03-17 | End: 2025-03-23

## 2025-03-17 RX ORDER — SODIUM CHLORIDE 9 MG/ML
40 INJECTION, SOLUTION INTRAVENOUS AS NEEDED
Status: DISCONTINUED | OUTPATIENT
Start: 2025-03-17 | End: 2025-03-26 | Stop reason: HOSPADM

## 2025-03-17 RX ORDER — BISACODYL 5 MG/1
5 TABLET, DELAYED RELEASE ORAL DAILY PRN
Status: DISCONTINUED | OUTPATIENT
Start: 2025-03-17 | End: 2025-03-26 | Stop reason: HOSPADM

## 2025-03-17 RX ORDER — LABETALOL HYDROCHLORIDE 5 MG/ML
10 INJECTION, SOLUTION INTRAVENOUS EVERY 4 HOURS PRN
Status: DISCONTINUED | OUTPATIENT
Start: 2025-03-17 | End: 2025-03-17

## 2025-03-17 RX ORDER — LORAZEPAM 2 MG/ML
0.5 INJECTION INTRAMUSCULAR ONCE AS NEEDED
Status: COMPLETED | OUTPATIENT
Start: 2025-03-17 | End: 2025-03-17

## 2025-03-17 RX ORDER — ACETAMINOPHEN 160 MG/5ML
650 SOLUTION ORAL EVERY 4 HOURS PRN
Status: DISCONTINUED | OUTPATIENT
Start: 2025-03-17 | End: 2025-03-24

## 2025-03-17 RX ORDER — SODIUM CHLORIDE 0.9 % (FLUSH) 0.9 %
10 SYRINGE (ML) INJECTION EVERY 12 HOURS SCHEDULED
Status: DISCONTINUED | OUTPATIENT
Start: 2025-03-17 | End: 2025-03-26 | Stop reason: HOSPADM

## 2025-03-17 RX ORDER — IPRATROPIUM BROMIDE AND ALBUTEROL SULFATE 2.5; .5 MG/3ML; MG/3ML
3 SOLUTION RESPIRATORY (INHALATION) EVERY 4 HOURS PRN
Status: DISCONTINUED | OUTPATIENT
Start: 2025-03-17 | End: 2025-03-26 | Stop reason: HOSPADM

## 2025-03-17 RX ORDER — ASPIRIN 81 MG/1
81 TABLET ORAL DAILY
Status: DISCONTINUED | OUTPATIENT
Start: 2025-03-17 | End: 2025-03-26 | Stop reason: HOSPADM

## 2025-03-17 RX ORDER — IOPAMIDOL 755 MG/ML
100 INJECTION, SOLUTION INTRAVASCULAR
Status: COMPLETED | OUTPATIENT
Start: 2025-03-17 | End: 2025-03-17

## 2025-03-17 RX ORDER — CLOPIDOGREL BISULFATE 75 MG/1
300 TABLET ORAL ONCE
Status: COMPLETED | OUTPATIENT
Start: 2025-03-17 | End: 2025-03-17

## 2025-03-17 RX ORDER — LEVOTHYROXINE SODIUM 175 UG/1
175 TABLET ORAL
Status: DISCONTINUED | OUTPATIENT
Start: 2025-03-17 | End: 2025-03-26 | Stop reason: HOSPADM

## 2025-03-17 RX ORDER — BISACODYL 10 MG
10 SUPPOSITORY, RECTAL RECTAL DAILY PRN
Status: DISCONTINUED | OUTPATIENT
Start: 2025-03-17 | End: 2025-03-26 | Stop reason: HOSPADM

## 2025-03-17 RX ORDER — ACETAMINOPHEN 325 MG/1
650 TABLET ORAL EVERY 4 HOURS PRN
Status: DISCONTINUED | OUTPATIENT
Start: 2025-03-17 | End: 2025-03-26 | Stop reason: HOSPADM

## 2025-03-17 RX ORDER — ACETAMINOPHEN 650 MG/1
650 SUPPOSITORY RECTAL EVERY 4 HOURS PRN
Status: DISCONTINUED | OUTPATIENT
Start: 2025-03-17 | End: 2025-03-24

## 2025-03-17 RX ORDER — SODIUM CHLORIDE 0.9 % (FLUSH) 0.9 %
10 SYRINGE (ML) INJECTION AS NEEDED
Status: DISCONTINUED | OUTPATIENT
Start: 2025-03-17 | End: 2025-03-26 | Stop reason: HOSPADM

## 2025-03-17 RX ORDER — ESCITALOPRAM OXALATE 20 MG/1
20 TABLET ORAL DAILY
COMMUNITY

## 2025-03-17 RX ORDER — POLYETHYLENE GLYCOL 3350 17 G/17G
17 POWDER, FOR SOLUTION ORAL DAILY PRN
Status: DISCONTINUED | OUTPATIENT
Start: 2025-03-17 | End: 2025-03-26 | Stop reason: HOSPADM

## 2025-03-17 RX ORDER — AMOXICILLIN 250 MG
2 CAPSULE ORAL 2 TIMES DAILY PRN
Status: DISCONTINUED | OUTPATIENT
Start: 2025-03-17 | End: 2025-03-26 | Stop reason: HOSPADM

## 2025-03-17 RX ORDER — LABETALOL HYDROCHLORIDE 5 MG/ML
20 INJECTION, SOLUTION INTRAVENOUS ONCE
Status: COMPLETED | OUTPATIENT
Start: 2025-03-17 | End: 2025-03-17

## 2025-03-17 RX ORDER — NITROGLYCERIN 0.4 MG/1
0.4 TABLET SUBLINGUAL
Status: DISCONTINUED | OUTPATIENT
Start: 2025-03-17 | End: 2025-03-24

## 2025-03-17 RX ORDER — ONDANSETRON 2 MG/ML
4 INJECTION INTRAMUSCULAR; INTRAVENOUS EVERY 6 HOURS PRN
Status: DISCONTINUED | OUTPATIENT
Start: 2025-03-17 | End: 2025-03-26 | Stop reason: HOSPADM

## 2025-03-17 RX ORDER — ASPIRIN 81 MG/1
244 TABLET ORAL ONCE
Status: COMPLETED | OUTPATIENT
Start: 2025-03-17 | End: 2025-03-17

## 2025-03-17 RX ADMIN — IPRATROPIUM BROMIDE AND ALBUTEROL SULFATE 3 ML: .5; 3 SOLUTION RESPIRATORY (INHALATION) at 20:14

## 2025-03-17 RX ADMIN — Medication 10 ML: at 08:08

## 2025-03-17 RX ADMIN — ESCITALOPRAM 20 MG: 10 TABLET, FILM COATED ORAL at 12:27

## 2025-03-17 RX ADMIN — ACETAMINOPHEN 650 MG: 325 TABLET, FILM COATED ORAL at 22:39

## 2025-03-17 RX ADMIN — Medication 10 MG: at 13:41

## 2025-03-17 RX ADMIN — LEVOTHYROXINE SODIUM 175 MCG: 175 TABLET ORAL at 12:27

## 2025-03-17 RX ADMIN — Medication 10 MG: at 09:36

## 2025-03-17 RX ADMIN — LORAZEPAM 0.5 MG: 2 INJECTION INTRAMUSCULAR; INTRAVENOUS at 14:51

## 2025-03-17 RX ADMIN — ASPIRIN 244 MG: 81 TABLET, COATED ORAL at 18:13

## 2025-03-17 RX ADMIN — CLOPIDOGREL BISULFATE 300 MG: 75 TABLET ORAL at 18:14

## 2025-03-17 RX ADMIN — IOPAMIDOL 100 ML: 755 INJECTION, SOLUTION INTRAVENOUS at 12:56

## 2025-03-17 RX ADMIN — Medication 10 ML: at 20:07

## 2025-03-17 RX ADMIN — Medication 20 MG: at 03:56

## 2025-03-17 RX ADMIN — Medication 5 MG: at 22:39

## 2025-03-17 RX ADMIN — ASPIRIN 81 MG: 81 TABLET, COATED ORAL at 16:55

## 2025-03-17 NOTE — H&P
"[facility] Medicine Services  History & Physical    Patient Name: Lexi Gilbert  : 1950  MRN: [id number]  Primary Care Physician:  Leandra Pettit MD  Date of admission: 3/17/2025  Date and Time of Service: 3/17/2025 at 1000    Subjective      Chief Complaint: left-sided weakness    History of Present Illness: Lexi Gilbert is a 74 y.o. female with a CMH of hypothyroidism, anxiety/depression, hx of stroke ~15 years ago who presented to [facility] on 3/17/2025 with  left-sided weakness.    Patient presented to the emergency department early this morning with complaint of left-sided weakness which began around 4 AM.  She states that she was watching TV when all of a sudden her left arm felt \"funny\", weak and slightly numb.  She also began having slight dizziness.  She stood up to go lay down in her bed but fell onto the bed and slid to the floor.  She states she feels like she fell because she was weak, particularly in her left leg and was off balance.  She could not get herself up. She attempted to call her son who lives with her but did not get an answer therefore she called EMS.  She says that she was not having any trouble using her phone.  She still feels slightly weak in the left side of her body.  She does not have dizziness any longer.  She denies any chest pain, shortness of breath, slurred speech, dysphagia, vision changes, syncope, near syncope, headache, fever, chills, GI symptoms, urinary symptoms, peripheral edema or other acute symptoms at this time.  She states her blood pressure runs SBP 140s to 150s at home and was surprised that her blood pressure was extremely elevated when EMS checked it. She is not on any antihypertensives or aspirin at home.     On arrival to the ED, her blood pressure was noted to be 220/120, heart rate 83, 96% on room air.  Labs remarkable for troponin 21, repeat 31, glucose 118.  Chest x-ray showed minor linear atelectasis in the left midlung.  CT " head showed no acute intracranial abnormality, moderate chronic small vessel ischemic change and chronic lacunar infarcts in the left putamen.  She was given IV Labetalol.  She is being admitted for further management.    Review of Systems   Constitutional:  Negative for chills, diaphoresis, fatigue and fever.   HENT: Negative.     Respiratory: Negative.     Cardiovascular: Negative.    Gastrointestinal: Negative.    Genitourinary: Negative.    Musculoskeletal:  Positive for gait problem.   Skin: Negative.    Neurological:  Positive for dizziness, weakness and numbness. Negative for seizures, syncope, facial asymmetry, speech difficulty, light-headedness and headaches.       Personal History     Past Medical History:   Diagnosis Date    Arthritis     right knee, sched scope    Bronchitis, chronic     CVA (cerebral infarction)     5 years ago    Hypertension     Hypothyroidism     IBS (irritable bowel syndrome)     Stroke 12/2011       Past Surgical History:   Procedure Laterality Date    BELPHAROPTOSIS REPAIR Bilateral 2003    HYSTERECTOMY      KNEE ARTHROSCOPY Right 5/20/2016    Procedure: KNEE ARTHROSCOPY debride of mmt  chondraplasty mfc/ lfc and patella;  Surgeon: Danie Crowell MD;  Location: [facility];  Service:     MOUTH SURGERY         Family History: family history includes Anesthesia problems in her sister; Cancer in her sister; Hypertension in her father. Otherwise pertinent FHx was reviewed and not pertinent to current issue.    Social History:  reports that she has been smoking cigarettes. She has a 40 pack-year smoking history. She has never used smokeless tobacco. She reports that she does not drink alcohol and does not use drugs.    Home Medications:  Prior to Admission Medications       Prescriptions Last Dose Informant Patient Reported? Taking?    escitalopram (LEXAPRO) 20 MG tablet 3/16/2025  Yes Yes    Take 1 tablet by mouth Daily.    levothyroxine (SYNTHROID, LEVOTHROID) 175 MCG tablet 3/16/2025   No Yes    TAKE ONE TABLET BY MOUTH DAILY              Allergies:  No Known Allergies    Objective      Vitals:   Temp:  [97.4 °F (36.3 °C)-98 °F (36.7 °C)] 97.4 °F (36.3 °C)  Heart Rate:  [57-88] 63  Resp:  [16-22] 22  BP: (127-238)/() 157/73  Body mass index is 44.56 kg/m².  Physical Exam  Constitutional:       Appearance: Normal appearance.   HENT:      Head: Normocephalic and atraumatic.      Mouth/Throat:      Mouth: Mucous membranes are moist.   Eyes:      Extraocular Movements: Extraocular movements intact.   Cardiovascular:      Rate and Rhythm: Normal rate and regular rhythm.   Pulmonary:      Effort: Pulmonary effort is normal.      Breath sounds: Normal breath sounds.   Abdominal:      Palpations: Abdomen is soft.      Tenderness: There is no abdominal tenderness.   Musculoskeletal:         General: Normal range of motion.      Cervical back: Normal range of motion.      Right lower leg: No edema.      Left lower leg: No edema.   Skin:     General: Skin is warm.   Neurological:      General: No focal deficit present.      Mental Status: She is alert and oriented to person, place, and time.         Diagnostic Data:  Lab Results (last 24 hours)       Procedure Component Value Units Date/Time    Basic Metabolic Panel [[id number]]  (Abnormal) Collected: 03/17/25 0615    Specimen: Blood Updated: 03/17/25 0642     Glucose 134 mg/dL      BUN 19 mg/dL      Creatinine 0.70 mg/dL      Sodium 142 mmol/L      Potassium 4.0 mmol/L      Chloride 107 mmol/L      CO2 24.1 mmol/L      Calcium 9.5 mg/dL      BUN/Creatinine Ratio 27.1     Anion Gap 10.9 mmol/L      eGFR 89.8 mL/min/1.73     Narrative:      GFR Categories in Chronic Kidney Disease (CKD)      GFR Category          GFR (mL/min/1.73)    Interpretation  G1                     90 or greater         Normal or high (1)  G2                      60-89                Mild decrease (1)  G3a                   45-59                Mild to moderate decrease  G3b                    30-44                Moderate to severe decrease  G4                    15-29                Severe decrease  G5                    14 or less           Kidney failure          (1)In the absence of evidence of kidney disease, neither GFR category G1 or G2 fulfill the criteria for CKD.    eGFR calculation 2021 CKD-EPI creatinine equation, which does not include race as a factor    High Sensitivity Troponin T 1Hr [[id number]]  (Abnormal) Collected: 03/17/25 0505    Specimen: Blood Updated: 03/17/25 0532     HS Troponin T 31 ng/L      Troponin T Numeric Delta 10 ng/L      Troponin T % Delta 48    Narrative:      High Sensitive Troponin T Reference Range:  <14.0 ng/L- Negative Female for AMI  <22.0 ng/L- Negative Male for AMI  >=14 - Abnormal Female indicating possible myocardial injury.  >=22 - Abnormal Male indicating possible myocardial injury.   Clinicians would have to utilize clinical acumen, EKG, Troponin, and serial changes to determine if it is an Acute Myocardial Infarction or myocardial injury due to an underlying chronic condition.         Comprehensive Metabolic Panel [[id number]]  (Abnormal) Collected: 03/17/25 0354    Specimen: Blood Updated: 03/17/25 0427     Glucose 118 mg/dL      BUN 19 mg/dL      Creatinine 0.67 mg/dL      Sodium 142 mmol/L      Potassium 4.3 mmol/L      Comment: Slight hemolysis detected by analyzer. Result may be falsely elevated.        Chloride 107 mmol/L      CO2 23.5 mmol/L      Calcium 9.8 mg/dL      Total Protein 6.7 g/dL      Albumin 4.1 g/dL      ALT (SGPT) 11 U/L      AST (SGOT) 19 U/L      Alkaline Phosphatase 81 U/L      Total Bilirubin 0.3 mg/dL      Globulin 2.6 gm/dL      A/G Ratio 1.6 g/dL      BUN/Creatinine Ratio 28.4     Anion Gap 11.5 mmol/L      eGFR 90.7 mL/min/1.73     Narrative:      GFR Categories in Chronic Kidney Disease (CKD)      GFR Category          GFR (mL/min/1.73)    Interpretation  G1                     90 or greater         Normal or  high (1)  G2                      60-89                Mild decrease (1)  G3a                   45-59                Mild to moderate decrease  G3b                   30-44                Moderate to severe decrease  G4                    15-29                Severe decrease  G5                    14 or less           Kidney failure          (1)In the absence of evidence of kidney disease, neither GFR category G1 or G2 fulfill the criteria for CKD.    eGFR calculation 2021 CKD-EPI creatinine equation, which does not include race as a factor    High Sensitivity Troponin T [[id number]]  (Abnormal) Collected: 03/17/25 0354    Specimen: Blood Updated: 03/17/25 0424     HS Troponin T 21 ng/L     Narrative:      High Sensitive Troponin T Reference Range:  <14.0 ng/L- Negative Female for AMI  <22.0 ng/L- Negative Male for AMI  >=14 - Abnormal Female indicating possible myocardial injury.  >=22 - Abnormal Male indicating possible myocardial injury.   Clinicians would have to utilize clinical acumen, EKG, Troponin, and serial changes to determine if it is an Acute Myocardial Infarction or myocardial injury due to an underlying chronic condition.         Extra Tubes [[id number]] Collected: 03/17/25 0354    Specimen: Blood, Venous Line Updated: 03/17/25 0415    Narrative:      The following orders were created for panel order Extra Tubes.  Procedure                               Abnormality         Status                     ---------                               -----------         ------                     Gold Top - SST[[id number]]                                   Final result               Light Blue Top[[id number]]                                   Final result                 Please view results for these tests on the individual orders.    Gold Top - SST [[id number]] Collected: 03/17/25 0354    Specimen: Blood Updated: 03/17/25 0415     Extra Tube Hold for add-ons.     Comment: Auto resulted.       Light Blue Top  [[id number]] Collected: 03/17/25 0354    Specimen: Blood Updated: 03/17/25 0415     Extra Tube Hold for add-ons.     Comment: Auto resulted       CBC & Differential [[id number]]  (Abnormal) Collected: 03/17/25 0354    Specimen: Blood Updated: 03/17/25 0402    Narrative:      The following orders were created for panel order CBC & Differential.  Procedure                               Abnormality         Status                     ---------                               -----------         ------                     CBC Auto Differential[[id number]]        Abnormal            Final result                 Please view results for these tests on the individual orders.    CBC Auto Differential [[id number]]  (Abnormal) Collected: 03/17/25 0354    Specimen: Blood Updated: 03/17/25 0402     WBC 8.83 10*3/mm3      RBC 5.13 10*6/mm3      Hemoglobin 15.0 g/dL      Hematocrit 47.7 %      MCV 93.0 fL      MCH 29.2 pg      MCHC 31.4 g/dL      RDW 13.2 %      RDW-SD 45.5 fl      MPV 10.2 fL      Platelets 253 10*3/mm3      Neutrophil % 76.7 %      Lymphocyte % 15.5 %      Monocyte % 5.5 %      Eosinophil % 1.8 %      Basophil % 0.3 %      Immature Grans % 0.2 %      Neutrophils, Absolute 6.76 10*3/mm3      Lymphocytes, Absolute 1.37 10*3/mm3      Monocytes, Absolute 0.49 10*3/mm3      Eosinophils, Absolute 0.16 10*3/mm3      Basophils, Absolute 0.03 10*3/mm3      Immature Grans, Absolute 0.02 10*3/mm3      nRBC 0.0 /100 WBC     POC Glucose Once [[id number]]  (Abnormal) Collected: 03/17/25 0345    Specimen: Blood Updated: 03/17/25 0347     Glucose 128 mg/dL      Comment: Serial Number: [id number]:  [id number]                Imaging Results (Last 24 Hours)       Procedure Component Value Units Date/Time    CT Angiogram Carotids [[id number]] Resulted: 03/17/25 1257     Updated: 03/17/25 1257    CT Angiogram Head [[id number]] Resulted: 03/17/25 1257     Updated: 03/17/25 1257    CT Head Without Contrast [[id number]] Collected: 03/17/25  0511     Updated: 03/17/25 0514    Narrative:      CT HEAD WO CONTRAST    Date of Exam: 3/17/2025 4:47 AM EDT    Indication: Elevated blood pressure headache numbness left arm near syncope.    Comparison: None available.    Technique: Axial CT images were obtained of the head without contrast administration.  Coronal reconstructions were performed.  Automated exposure control and iterative reconstruction methods were used.      Findings:  There is a chronic lacunar infarct in the posterior left putamen. There is moderate patchy white matter hypoattenuation. No acute intracranial hemorrhage. Cortical gray-white differentiation is normal. Mild intracranial atherosclerotic calcification.   Small mastoid effusions. There is a sphenoid sinus mucus retention cyst. Orbits are symmetric. No mass effect, midline shift or abnormal extra-axial collection.      Impression:      Impression:  1.No acute intracranial abnormality.  2.Moderate chronic small vessel ischemic change and chronic lacunar infarct in the left putamen.  3.Small mastoid effusions.          Electronically Signed: Feim Infante MD    3/17/2025 5:12 AM EDT    Workstation ID: [id number]    XR Chest 1 View [[id number]] Collected: 03/17/25 0418     Updated: 03/17/25 0421    Narrative:      XR CHEST 1 VW    Date of Exam: 3/17/2025 4:03 AM EDT    Indication: General weakness    Comparison: 5/19/2016.    Findings:  There is minor linear atelectasis in the periphery of the left midlung. There is no pneumothorax, pleural effusion or focal airspace consolidation. Heart size and pulmonary vasculature appear within normal limits. Regional bones appear intact.      Impression:      Impression:  Minor linear atelectasis in the left midlung.          Electronically Signed: Femi Infante MD    3/17/2025 4:19 AM EDT    Workstation ID: [id number]              Assessment & Plan        This is a 74 y.o. female with:    Active and Resolved Problems  Active Hospital Problems     Diagnosis  POA    **Accelerated hypertension [I10]  Yes      Resolved Hospital Problems   No resolved problems to display.       Stroke-like symptoms  -Patient presented with left arm and left leg weakness since around 4am. She has a hx of stroke and states her symptoms are similar to her prior stroke. tPA was not given on arrival.   -Reviewed CTH - chronic small vessel ischemic disease and chronic lacunar infarct. No acute findings.   -Will obtain stat CTA head and carotids  -MRI ordered and pending at this time  -NIHSS BID and neuro-checks Q4H  -Start aspirin  -OT/PT consult  -ECHO ordered and pending  -Telemetry  -Allow permissive hypertension for now pending MRI  -Consult neurology -- appreciate further recommendations    Hypertensive emergency  -Initial blood pressure 220/120.  She was given one dose of IV Labetalol.  Blood pressure trending down, currently 151/79.  Will hold additional antihypertensive agents for now to allow for permissive hypertension.  -Troponin trending up 21->31, likely secondary to severe hypertension. She is without chest pain.  EKG reveals LBBB which is chronic, has been present since at least 2016.    Anxiety/depression  -Continue lexapro    Hypothyroidism  -Continue levothyroxine. Check TSH/FT4.    VTE Prophylaxis:  Mechanical VTE prophylaxis orders are present.        The patient desires to be as follows:    CODE STATUS:    Code Status (Patient has no pulse and is not breathing): CPR (Attempt to Resuscitate)  Medical Interventions (Patient has pulse or is breathing): Full Support        Israel Gilbert, who can be contacted at [phone], is the designated person to make medical decisions on the patient's behalf if She is incapable of doing so. This was clarified with patient and/or next of kin on 3/17/2025 during the course of this H&P.    Admission Status:  I believe this patient meets obs status.    Expected Length of Stay: 1 day    PDMP and Medication Dispenses via Sidebar reviewed  and consistent with patient reported medications.    I discussed the patient's findings and my recommendations with patient and nursing staff.      Signature:     This document has been electronically signed by Linh Adkins PA-C on March 17, 2025 13:06 EDT   [facility]

## 2025-03-17 NOTE — CONSULTS
Primary Care Provider: Leandra Pettit MD     Consult requested by:  hospitalist service     Reason for Consultation: Neurological evaluation - Left sided weakness    History taken from: patient chart RN    Chief complaint: left sided weakness       SUBJECTIVE:    History of present illness: Background per H&P: Lexi Gilbert is a 74 y.o. female who has a PMHx of hypothyroidism, anxiety/depression, hx of stroke ~15 years ago without residual sx who presented to [facility] on 3/17/2025 with left-sided weakness.     Patient presented to this facility earlier this morning with mental left-sided weakness starting around 2 AM.  Point was watching TV and suddenly felt her left arm feeling strange, weak and slightly numb.  Around the same time she began experiencing slight dizziness went to stand up and go lay down in her bed but fell onto the bed and slid to the floor.  She reportedly fell because she felt weak in her legs and felt off balance.  She could not get herself up and called EMS.  By time she arrived to the ED, her dizziness had improved and had some residual weakness on the left side of her body.    On arrival to the ED, her blood pressure was noted to be 220/120 troponin of 21 and glucose 118.  CT head was unremarkable.  CTA showed no large vessel occlusion, flow-limiting stenosis or aneurysm intracranially.  There was focal high-grade stenosis in the distal right vertebral artery at the level of the skull base just proximal to the basilar artery.  Mild to moderate short segment stenosis in the right M2 insular branch.  She received 1 dose of IV labetalol and was admitted for further management.      NIHSS 3  LKW 0200 3/17/25  No A/C or A/P use      - Portions of the above HPI were copied from previous encounters and edited as appropriate. PMH as detailed below.     Review of Systems   Constitutional:  Negative for fatigue and fever.   HENT:  Negative for ear discharge, ear pain, tinnitus, trouble  swallowing and voice change.    Eyes:  Negative for photophobia, pain and visual disturbance.   Respiratory:  Negative for chest tightness and shortness of breath.    Cardiovascular:  Negative for chest pain.   Gastrointestinal:  Negative for nausea and vomiting.   Musculoskeletal:  Negative for back pain, neck pain and neck stiffness.   Neurological:  Positive for weakness and numbness. Negative for dizziness, tremors, seizures, syncope, facial asymmetry, speech difficulty, light-headedness and headaches.   All other systems reviewed and are negative.         PATIENT HISTORY:  Past Medical History:   Diagnosis Date    Arthritis     right knee, sched scope    Bronchitis, chronic     CVA (cerebral infarction)     5 years ago    Hypertension     Hypothyroidism     IBS (irritable bowel syndrome)     Stroke 12/2011   ,   Past Surgical History:   Procedure Laterality Date    BELPHAROPTOSIS REPAIR Bilateral 2003    HYSTERECTOMY      KNEE ARTHROSCOPY Right 5/20/2016    Procedure: KNEE ARTHROSCOPY debride of mmt  chondraplasty mfc/ lfc and patella;  Surgeon: Danie Crowell MD;  Location: [facility];  Service:     MOUTH SURGERY     ,   Family History   Problem Relation Age of Onset    Hypertension Father     Cancer Sister     Anesthesia problems Sister    ,   Social History     Tobacco Use    Smoking status: Every Day     Current packs/day: 1.00     Average packs/day: 1 pack/day for 40.0 years (40.0 ttl pk-yrs)     Types: Cigarettes    Smokeless tobacco: Never   Vaping Use    Vaping status: Never Used   Substance Use Topics    Alcohol use: No    Drug use: No   ,   Prior to Admission medications    Medication Sig Start Date End Date Taking? Authorizing Provider   escitalopram (LEXAPRO) 20 MG tablet Take 1 tablet by mouth Daily.   Yes Karlene Campbell MD   levothyroxine (SYNTHROID, LEVOTHROID) 175 MCG tablet TAKE ONE TABLET BY MOUTH DAILY 6/27/16  Yes Leandra Pettit MD   escitalopram (LEXAPRO) 10 MG tablet TAKE ONE TABLET BY  MOUTH DAILY 4/25/16 3/17/25 Yes Leandra Pettit MD    Allergies:  Patient has no known allergies.    Current Facility-Administered Medications   Medication Dose Route Frequency Provider Last Rate Last Admin    acetaminophen (TYLENOL) tablet 650 mg  650 mg Oral Q4H PRN Renata Eckert APRN        Or    acetaminophen (TYLENOL) 160 MG/5ML oral solution 650 mg  650 mg Oral Q4H PRN Renata Eckert APRN        Or    acetaminophen (TYLENOL) suppository 650 mg  650 mg Rectal Q4H PRN Renata Eckert APRN        aspirin EC tablet 81 mg  81 mg Oral Daily Linh Adkins PA-C   81 mg at 03/17/25 1655    sennosides-docusate (PERICOLACE) 8.6-50 MG per tablet 2 tablet  2 tablet Oral BID PRN Renata Eckert APRN        And    polyethylene glycol (MIRALAX) packet 17 g  17 g Oral Daily PRN Reanta Eckert APRN        And    bisacodyl (DULCOLAX) EC tablet 5 mg  5 mg Oral Daily PRN Renata Eckert APRN        And    bisacodyl (DULCOLAX) suppository 10 mg  10 mg Rectal Daily PRN Renata Eckert APRN        escitalopram (LEXAPRO) tablet 20 mg  20 mg Oral Daily Linh Adkins PA-C   20 mg at 03/17/25 1227    labetalol (NORMODYNE,TRANDATE) injection 10 mg  10 mg Intravenous Q4H PRN Linh Adkins PA-C        levothyroxine (SYNTHROID, LEVOTHROID) tablet 175 mcg  175 mcg Oral Q AM Linh Adkins PA-C   175 mcg at 03/17/25 1227    melatonin tablet 5 mg  5 mg Oral Nightly PRN Renata Eckert APRN        nitroglycerin (NITROSTAT) SL tablet 0.4 mg  0.4 mg Sublingual Q5 Min PRN Renata Eckert APRN        ondansetron (ZOFRAN) injection 4 mg  4 mg Intravenous Q6H PRN Renata Eckert APRN        sodium chloride 0.9 % flush 10 mL  10 mL Intravenous PRN Pratik Young MD        sodium chloride 0.9 % flush 10 mL  10 mL Intravenous Q12H Renata Eckert APRN   10 mL at 03/17/25 0808    sodium chloride 0.9 % flush 10 mL  10 mL Intravenous PRN Renata Eckert APRN        sodium chloride 0.9 % infusion 40 mL  40 mL Intravenous PRN  Renata Eckert, APRN            ________________________________________________________        OBJECTIVE:  Upon today's exam,     GEN: NAD, pleasant, cooperative  CHEST: No signs of resp distress, on room air    NEURO  MENTAL STATUS: AAOx3, memory intact, fund of knowledge appropriate  LANG/SPEECH: Naming and repetition intact, fluent, follows 3-step commands    CRANIAL NERVES:  II-XII grossly intact    MOTOR:  Motor strength 5/5 throughout, symmetric.     REFLEXES: 2/4 throughout    SENSORY:  Normal to touch, temp all limbs  No hemineglect, no extinction to double-sided stimulation (visual & tactile)  COORD: Normal finger to nose          NIH Stroke Scale  1a. Level of Consciousness: 0-->Alert, keenly responsive  1b. LOC Questions: 0-->Answers both questions correctly  1c. LOC Commands: 0-->Performs both tasks correctly  2. Best Gaze: 0-->Normal  3. Visual: 0-->No visual loss  4. Facial Palsy: 1-->Minor paralysis (flattened nasolabial fold, asymmetry on smiling)  5a. Motor Arm, Left: 1-->Drift, limb holds 90 (or 45) degrees, but drifts down before full 10 seconds, does not hit bed or other support  5b. Motor Arm, Right: 0-->No drift, limb holds 90 (or 45) degrees for full 10 secs  6a. Motor Leg, Left: 0-->No drift, leg holds 30 degree position for full 5 secs  6b. Motor Leg, Right: 0-->No drift, leg holds 30 degree position for full 5 secs  7. Limb Ataxia: 0-->Absent  8. Sensory: 0-->Normal, no sensory loss  9. Best Language: 0-->No aphasia, normal  10. Dysarthria: 1-->Mild-to-moderate dysarthria, patient slurs at least some words and, at worst, can be understood with some difficulty  11. Extinction and Inattention (formerly Neglect): 0-->No abnormality  Total (NIH Stroke Scale): 3         ________________________________________________________   RESULTS REVIEW:    VITAL SIGNS:   Temp:  [97.4 °F (36.3 °C)-98 °F (36.7 °C)] 97.8 °F (36.6 °C)  Heart Rate:  [57-88] 69  Resp:  [14-22] 19  BP: (127-238)/()  161/72     LABS:      Lab 03/17/25  0354   WBC 8.83   HEMOGLOBIN 15.0   HEMATOCRIT 47.7*   PLATELETS 253   NEUTROS ABS 6.76   IMMATURE GRANS (ABS) 0.02   LYMPHS ABS 1.37   MONOS ABS 0.49   EOS ABS 0.16   MCV 93.0         Lab 03/17/25  1313 03/17/25  0615 03/17/25  0354   SODIUM  --  142 142   POTASSIUM  --  4.0 4.3   CHLORIDE  --  107 107   CO2  --  24.1 23.5   ANION GAP  --  10.9 11.5   BUN  --  19 19   CREATININE  --  0.70 0.67   EGFR  --  89.8 90.7   GLUCOSE  --  134* 118*   CALCIUM  --  9.5 9.8   TSH 1.650  --   --          Lab 03/17/25  0354   TOTAL PROTEIN 6.7   ALBUMIN 4.1   GLOBULIN 2.6   ALT (SGPT) 11   AST (SGOT) 19   BILIRUBIN 0.3   ALK PHOS 81         Lab 03/17/25  1313 03/17/25  0505 03/17/25  0354   HSTROP T 31* 31* 21*                     Lab Results   Component Value Date    TSH 1.650 03/17/2025     (H) 07/27/2020    [id number] 07/27/2020       IMAGING STUDIES:  MRI Brain Without Contrast  Result Date: 3/17/2025  Impression: 1. Small punctate area of acute ischemia at junction of lateral right thalamus and posterior limb of right internal capsule. 2. Extensive white matter findings most compatible with moderate to severe chronic microvascular disease. Electronically Signed: Mejia Adams MD  3/17/2025 4:08 PM EDT  Workstation ID: [id number]    CT Angiogram Carotids  Result Date: 3/17/2025  1.No large vessel occlusive thrombus, flow-limiting stenosis or aneurysm is seen intracranially. 2.Focal high-grade stenosis is suggested within the distal right vertebral artery at the level of the skull base just proximal to the basilar artery. 3.Mild to moderate short segment stenosis is suggested within a right M2 insular branch. Electronically Signed: Naty Haynes MD  3/17/2025 1:55 PM EDT  Workstation ID: [id number]    CT Angiogram Head  Result Date: 3/17/2025  1.No large vessel occlusive thrombus, flow-limiting stenosis or aneurysm is seen intracranially. 2.Focal high-grade stenosis is suggested  within the distal right vertebral artery at the level of the skull base just proximal to the basilar artery. 3.Mild to moderate short segment stenosis is suggested within a right M2 insular branch. Electronically Signed: Naty Haynes MD  3/17/2025 1:55 PM EDT  Workstation ID: [id number]    CT Head Without Contrast  Result Date: 3/17/2025  Impression: 1.No acute intracranial abnormality. 2.Moderate chronic small vessel ischemic change and chronic lacunar infarct in the left putamen. 3.Small mastoid effusions. Electronically Signed: Femi Infante MD  3/17/2025 5:12 AM EDT  Workstation ID: [id number]    XR Chest 1 View  Result Date: 3/17/2025  Impression: Minor linear atelectasis in the left midlung. Electronically Signed: Femi Infante MD  3/17/2025 4:19 AM EDT  Workstation ID: [id number]      I reviewed the patient's new clinical results.    ________________________________________________________     PROBLEM LIST:    Accelerated hypertension            ASSESSMENT/PLAN:    Acute left sided weakness  chronic lacunar infarct in the posterior left putamen   Acute infarct within the lateral right thalamus likely 2/2 HTN    NIHSS 3    Imaging reviewed  NCCT: negative  CTA H/N: no LVO. Focal high-grade stenosis is suggested within the distal right vertebral artery at the level of the skull base just proximal to the basilar artery.   Mild to moderate short segment stenosis is suggested within a right M2 insular branch.   MRI Brain WO: Small punctate area of acute ischemia at junction of lateral right thalamus and posterior limb of right internal capsule.   Baseline EKG and chest x-ray  Echocardiogram with bubble study  Continuous cardiac telemetry to monitor for arrhythmia  Plavix load 300 mg once PO +  mg once PO/AK, then ASA 81 mg daily PO and Plavix 75 mg daily PO x21 days then transition to ASA 81 mg daily PO monotherapy  Permissive hypertension for 24 hours.  Labetalol 10 mg IV, may repeat x1. PRN BP >220/120  (non-tenecteplase candidate)  Stroke labwork: HgbA1C, lipid panel, B12, TSH, urine drug screen  High intensity statin therapy for LDL >70 (to be started prior to discharge)  PT/OT/Speech/swallow consults as indicated   Please document NIHSS on admission and with any neurochanges  Strict NPO until patient passes RN stroke swallow screen or speech therapy evaluation  Oxygen therapy (titrate to keep SpO2 greater than 94%  Activity as tolerated  Stroke education provided by nursing per institutional guidelines             Modification of stroke risk factors:   - Blood pressure should be less than 130/80 outpatient, HbA1c less than 6.5, LDL less than 70; b12>500 and smoking cessation if applicable. We would be grateful if the primary team / primary care physician would keep a close watch on the above targets.  - Stroke education  - Follow up with neurologist of choice      I discussed the patient's findings and my recommendations with patient, nursing staff, and primary care team    Mary Alice Nguyễn MD  03/17/25  17:27 EDT

## 2025-03-17 NOTE — ED NOTES
Pt reports she had left arm weakness and numbness and got up to go lay down and when she got up she got dizzy and fell, she did not hit her head. Pt states her left arm still feels a little weak.

## 2025-03-17 NOTE — SIGNIFICANT NOTE
03/17/25 1500   OTHER   Discipline physical therapist   Rehab Time/Intention   Session Not Performed unable to evaluate, medical status change;other (see comments)  (pt hypertensive, nauseous, and having severe dizziness; hold PT.)   Recommendation   PT - Next Appointment 03/18/25

## 2025-03-17 NOTE — ED PROVIDER NOTES
Subjective   History of Present Illness  Chief complaint weakness and fall    History of present illness 74-year-old female with a history of arthritis and previous stroke who states that she was up watching [employer] about an hour prior to arrival when she started to feel funny in her head she got up and her legs collapsed out from under her she fell to the ground and was generally weak and she was unable to get up.  She states that she was struggling to get up she felt like her left arm could be numb and may be a little bit weak on her left compared to the right she was able to finally wake her son up and they called EMS EMS transported to the hospital they had a negative stroke screening.  But they did find elevated blood pressure.  The patient denies taking any medication for blood pressure he denies any recent injury illness flus viruses vaccinations foreign travels antibiotic use or hospitalization no urinary complaints no bowel complaints no recent long car ride plane ride immobilization leg pain or swelling.  Some headache.  No injury from the fall denies any chest pain neck arm jaw pain or shortness of breath.      Review of Systems   Constitutional:  Negative for chills and fever.   HENT:  Negative for congestion.    Respiratory:  Negative for chest tightness and shortness of breath.    Cardiovascular:  Negative for chest pain and palpitations.   Gastrointestinal:  Negative for abdominal pain, blood in stool and vomiting.   Genitourinary:  Negative for difficulty urinating and dysuria.   Musculoskeletal:  Positive for arthralgias. Negative for back pain and neck pain.   Skin:  Negative for rash.   Neurological:  Positive for weakness, numbness and headaches. Negative for dizziness, facial asymmetry and speech difficulty.   Psychiatric/Behavioral:  Negative for confusion.        Past Medical History:   Diagnosis Date    Arthritis     right knee, sched scope    Bronchitis, chronic     CVA (cerebral  infarction)     5 years ago    Hypertension     Hypothyroidism     IBS (irritable bowel syndrome)     Stroke 12/2011       No Known Allergies    Past Surgical History:   Procedure Laterality Date    BELPHAROPTOSIS REPAIR Bilateral 2003    HYSTERECTOMY      KNEE ARTHROSCOPY Right 5/20/2016    Procedure: KNEE ARTHROSCOPY debride of mmt  chondraplasty mfc/ lfc and patella;  Surgeon: Danie Crowell MD;  Location: [facility];  Service:     MOUTH SURGERY         Family History   Problem Relation Age of Onset    Hypertension Father     Cancer Sister     Anesthesia problems Sister        Social History     Socioeconomic History    Marital status:    Tobacco Use    Smoking status: Every Day     Current packs/day: 1.00     Average packs/day: 1 pack/day for 40.0 years (40.0 ttl pk-yrs)     Types: Cigarettes    Smokeless tobacco: Never   Substance and Sexual Activity    Alcohol use: No    Drug use: No    Sexual activity: Defer     Prior to Admission medications    Medication Sig Start Date End Date Taking? Authorizing Provider   escitalopram (LEXAPRO) 10 MG tablet TAKE ONE TABLET BY MOUTH DAILY 4/25/16   Leandra Pettit MD   levothyroxine (SYNTHROID, LEVOTHROID) 175 MCG tablet TAKE ONE TABLET BY MOUTH DAILY 6/27/16   Leandra Pettit MD          Objective   Physical Exam  Constitutional is a 74-year-old female awake alert no acute distress blood pressure is elevated 220/120 rechecks even up to 230 and persistently running this level a 2-3 checks.  Triage vital signs reviewed.  HEENT extraocular muscles are intact pupils equal round react there is no photophobia there is no papilledema mouth is clear neck supple no adenopathy no JV no bruits back no cervical thoracic lumbar spine tenderness noted.  Lungs clear no retraction heart regular without murmur rub abdomen soft nontender good bowel sounds no peritoneal findings or pulsatile masses extremities pulses equal throughout upper lower extremities no edema cords or Homans' sign  no evidence of DVT skin is warm and dry without rashes neurologic awake alert orientated x 4 no face asymmetry speech normal opens and closes eyes at difficulty no gaze limitations.  Peripheral vision intact confrontation no extinction tongue soft palate normal she knows her name she knows the month she knows how old she is.  There is no drift in the arms or legs normal finger-to-nose toes downgoing no clonus sensation equal bilaterally throughout the face arms and legs no extinction she is able to read appropriately and identify objects and pictures without difficulty and describe with going on.  NIH is 0  Procedures           ED Course      Results for orders placed or performed during the hospital encounter of 03/17/25   ECG 12 Lead Stroke Evaluation    Collection Time: 03/17/25  3:41 AM   Result Value Ref Range    QT Interval 409 ms    QTC Interval 496 ms   POC Glucose Once    Collection Time: 03/17/25  3:45 AM    Specimen: Blood   Result Value Ref Range    Glucose 128 (H) 70 - 105 mg/dL   Comprehensive Metabolic Panel    Collection Time: 03/17/25  3:54 AM    Specimen: Blood   Result Value Ref Range    Glucose 118 (H) 65 - 99 mg/dL    BUN 19 8 - 23 mg/dL    Creatinine 0.67 0.57 - 1.00 mg/dL    Sodium 142 136 - 145 mmol/L    Potassium 4.3 3.5 - 5.2 mmol/L    Chloride 107 98 - 107 mmol/L    CO2 23.5 22.0 - 29.0 mmol/L    Calcium 9.8 8.6 - 10.5 mg/dL    Total Protein 6.7 6.0 - 8.5 g/dL    Albumin 4.1 3.5 - 5.2 g/dL    ALT (SGPT) 11 1 - 33 U/L    AST (SGOT) 19 1 - 32 U/L    Alkaline Phosphatase 81 39 - 117 U/L    Total Bilirubin 0.3 0.0 - 1.2 mg/dL    Globulin 2.6 gm/dL    A/G Ratio 1.6 g/dL    BUN/Creatinine Ratio 28.4 (H) 7.0 - 25.0    Anion Gap 11.5 5.0 - 15.0 mmol/L    eGFR 90.7 >60.0 mL/min/1.73   High Sensitivity Troponin T    Collection Time: 03/17/25  3:54 AM    Specimen: Blood   Result Value Ref Range    HS Troponin T 21 (H) <14 ng/L   CBC Auto Differential    Collection Time: 03/17/25  3:54 AM    Specimen:  Blood   Result Value Ref Range    WBC 8.83 3.40 - 10.80 10*3/mm3    RBC 5.13 3.77 - 5.28 10*6/mm3    Hemoglobin 15.0 12.0 - 15.9 g/dL    Hematocrit 47.7 (H) 34.0 - 46.6 %    MCV 93.0 79.0 - 97.0 fL    MCH 29.2 26.6 - 33.0 pg    MCHC 31.4 (L) 31.5 - 35.7 g/dL    RDW 13.2 12.3 - 15.4 %    RDW-SD 45.5 37.0 - 54.0 fl    MPV 10.2 6.0 - 12.0 fL    Platelets 253 140 - 450 10*3/mm3    Neutrophil % 76.7 (H) 42.7 - 76.0 %    Lymphocyte % 15.5 (L) 19.6 - 45.3 %    Monocyte % 5.5 5.0 - 12.0 %    Eosinophil % 1.8 0.3 - 6.2 %    Basophil % 0.3 0.0 - 1.5 %    Immature Grans % 0.2 0.0 - 0.5 %    Neutrophils, Absolute 6.76 1.70 - 7.00 10*3/mm3    Lymphocytes, Absolute 1.37 0.70 - 3.10 10*3/mm3    Monocytes, Absolute 0.49 0.10 - 0.90 10*3/mm3    Eosinophils, Absolute 0.16 0.00 - 0.40 10*3/mm3    Basophils, Absolute 0.03 0.00 - 0.20 10*3/mm3    Immature Grans, Absolute 0.02 0.00 - 0.05 10*3/mm3    nRBC 0.0 0.0 - 0.2 /100 WBC   Gold Top - SST    Collection Time: 03/17/25  3:54 AM   Result Value Ref Range    Extra Tube Hold for add-ons.    Light Blue Top    Collection Time: 03/17/25  3:54 AM   Result Value Ref Range    Extra Tube Hold for add-ons.    High Sensitivity Troponin T 1Hr    Collection Time: 03/17/25  5:05 AM    Specimen: Blood   Result Value Ref Range    HS Troponin T 31 (H) <14 ng/L    Troponin T Numeric Delta 10 (C) Abnormal if >/=3 ng/L    Troponin T % Delta 48 (C) Abnormal if >/= 20%     CT Head Without Contrast  Result Date: 3/17/2025  Impression: 1.No acute intracranial abnormality. 2.Moderate chronic small vessel ischemic change and chronic lacunar infarct in the left putamen. 3.Small mastoid effusions. Electronically Signed: Femi Infante MD  3/17/2025 5:12 AM EDT  Workstation ID: [id number]    XR Chest 1 View  Result Date: 3/17/2025  Impression: Minor linear atelectasis in the left midlung. Electronically Signed: Femi Infante MD  3/17/2025 4:19 AM EDT  Workstation ID: [id number]    Medications   sodium chloride 0.9 %  flush 10 mL (has no administration in time range)   labetalol (NORMODYNE,TRANDATE) injection 20 mg (20 mg Intravenous Given 3/17/25 0356)                                                        Medical Decision Making  Medical decision making.  Patient IV established monitor placed my review of sinus rhythm EKG obtained my interpretation reveals a normal sinus rhythm left bundle branch block interventricular conduction delay QTc of 496 nothing old to compare with.  But will go back and look at records in May 2016 there is a report of an EKG I cannot see the EKG but report lists a left bundle branch block.  Patient had labetalol 20 mg IV given.  She underwent an emergent CT scan head without my independent review I do not see any evidence of tumors masses hemorrhage or acute finding radiology review shows no intracranial abnormality chronic small vessel disease chronic lacunar infarct in the left putamen small mastoid effusions.  Obtained by independent review comprehensive metabolic profile unremarkable troponin was 21 CBC unremarkable repeat troponin 31.  Chest x-ray my independent review no pneumonia pneumothorax or failure radiology review some mild linear atelectasis in the left midlung.  The patient repeat examination at 5:35 AM blood pressure was 150/80.  She was awake alert orientated x 3 no face asymmetry speech normal there is no papilledema tongue soft palate normal no drift the arms or legs normal finger-to-nose sensations equal throughout the face arms and legs peripheral vision is intact no extinction toes downgoing no clonus normal finger-to-nose no ataxia she sits up without difficulty she pulls her self up with her arms without difficulty NIH is again 0 no truncal ataxia.  I do not see any evidence of acute stroke or acute meningitis or encephalitis consider TIA although I think unlikely I think more of a hypertensive issue accelerated hypertension hypertensive emergency I will see evidence of  intracerebral hemorrhage or acute ST was myocardial infarction aortic dissection carotid or vertebral artery dissection pericarditis myocarditis pericardial effusion DVT pulmonary embolism sepsis bacteremia based on my history and physical clinical findings.  The left arm is intact he is got great pulses 3 clinical extremities she moves everything out difficulty at this time no cellulitic changes or red hot swollen joints.  Not complete list of all possibilities.  Blood pressures improved she is made aware of the findings I talked to the hospitalist nurse practitioner Case discussed and she will be admitted to hospital for further care stable otherwise unremarkable improved ER course.  Patient has no evidence of large vessel occlusion patient is not a thrombolytic candidate as her NIH is 0.    Problems Addressed:  Accelerated hypertension: complicated acute illness or injury  Fall, initial encounter: complicated acute illness or injury  Weakness: complicated acute illness or injury    Amount and/or Complexity of Data Reviewed  Labs: ordered. Decision-making details documented in ED Course.  Radiology: ordered and independent interpretation performed. Decision-making details documented in ED Course.  ECG/medicine tests: ordered and independent interpretation performed. Decision-making details documented in ED Course.    Risk  Decision regarding hospitalization.        Final diagnoses:   Accelerated hypertension   Weakness   Fall, initial encounter       ED Disposition  ED Disposition       ED Disposition   Intended Admit    Condition   --    Comment   --               No follow-up provider specified.       Medication List      No changes were made to your prescriptions during this visit.            Pratik Young MD  03/17/25 3883

## 2025-03-17 NOTE — SIGNIFICANT NOTE
03/17/25 1521   Rehab Time/Intention   Session Not Performed   (/91, will follow up tomorrow.)   Recommendation   OT - Next Appointment 03/18/25

## 2025-03-17 NOTE — CASE MANAGEMENT/SOCIAL WORK
Discharge Planning Assessment   Jean Marie     Patient Name: Lexi Gilbert  MRN: [id number]  Today's Date: 3/17/2025    Admit Date: 3/17/2025    Plan: Return home with family   Discharge Needs Assessment       Row Name 03/17/25 0906       Living Environment    People in Home child(levi), adult    Name(s) of People in Home Son Deven and daughter-in-law Ramila    Current Living Arrangements home    Potentially Unsafe Housing Conditions none    In the past 12 months has the electric, gas, oil, or water company threatened to shut off services in your home? No    Primary Care Provided by self    Provides Primary Care For no one    Family Caregiver if Needed child(levi), adult    Family Caregiver Names Son Deven and daughter in law Ramila    Quality of Family Relationships supportive;helpful    Able to Return to Prior Arrangements yes       Resource/Environmental Concerns    Resource/Environmental Concerns none       Transportation Needs    In the past 12 months, has lack of transportation kept you from medical appointments or from getting medications? no    In the past 12 months, has lack of transportation kept you from meetings, work, or from getting things needed for daily living? No       Food Insecurity    Within the past 12 months, you worried that your food would run out before you got the money to buy more. Never true    Within the past 12 months, the food you bought just didn't last and you didn't have money to get more. Never true       Transition Planning    Patient/Family Anticipates Transition to home with family    Patient/Family Anticipated Services at Transition none    Transportation Anticipated family or friend will provide       Discharge Needs Assessment    Equipment Currently Used at Home none    Concerns to be Addressed denies needs/concerns at this time    Anticipated Changes Related to Illness none    Equipment Needed After Discharge none                   Discharge Plan       Row Name 03/17/25 0907        Plan    Plan Return home with family    Patient/Family in Agreement with Plan yes    Plan Comments  spoke with patient at bedside.She reports she lives with son Deven and daughter-in -[facility] She reports she is independent with all ADLs and doesn't use any DME. She confirms pcp and wants enrolled in meds to bed.She states Deven or Ramila will provide transportation at discharge. She denies difficulty obtaining food, medication, utilities,transportation. She intends to return home at dc and denies dc needs. DC Barriers: PT eval                       Demographic Summary       Row Name 03/17/25 0903       General Information    Admission Type observation    Arrived From home    Required Notices Provided Observation Status Notice    Referral Source admission list    Reason for Consult discharge planning    Preferred Language English       Contact Information    Permission Granted to Share Info With                    Functional Status       Row Name 03/17/25 0904       Functional Status    Usual Activity Tolerance good    Current Activity Tolerance good       Functional Status, IADL    Medications independent    Meal Preparation independent    Housekeeping independent    Laundry independent    Shopping independent       Mental Status    General Appearance WDL WDL                  Patient Forms       Row Name 03/17/25 0912       Patient Forms    Important Message from Medicare ([facility]) --  VALDES 3/17/25 per registration    Patient Observation Letter Delivered  VALDES 3/17/25 per registration                  Cailin Antunez RN, [facility]  Office: [phone]  Fax: [phone]  [email or web address]      I met with patient in room wearing PPE: mask    Maintained distance greater than six feet and spent </=15 minutes in the room     Cailin Antunez RN

## 2025-03-17 NOTE — Clinical Note
Level of Care: Telemetry [5]   Admitting Physician: YONATHAN GOLDEN [[id number]]   Attending Physician: YONATHAN GOLDEN [[id number]]

## 2025-03-18 ENCOUNTER — APPOINTMENT (OUTPATIENT)
Dept: GENERAL RADIOLOGY | Facility: HOSPITAL | Age: 75
End: 2025-03-18
Payer: MEDICARE

## 2025-03-18 LAB
GEN 5 1HR TROPONIN T REFLEX: 21 NG/L
GLUCOSE BLDC GLUCOMTR-MCNC: 102 MG/DL (ref 70–105)
TROPONIN T % DELTA: 0
TROPONIN T NUMERIC DELTA: 0 NG/L
TROPONIN T SERPL HS-MCNC: 21 NG/L

## 2025-03-18 PROCEDURE — 97166 OT EVAL MOD COMPLEX 45 MIN: CPT | Performed by: OCCUPATIONAL THERAPIST

## 2025-03-18 PROCEDURE — 94761 N-INVAS EAR/PLS OXIMETRY MLT: CPT

## 2025-03-18 PROCEDURE — 99233 SBSQ HOSP IP/OBS HIGH 50: CPT | Performed by: STUDENT IN AN ORGANIZED HEALTH CARE EDUCATION/TRAINING PROGRAM

## 2025-03-18 PROCEDURE — 97162 PT EVAL MOD COMPLEX 30 MIN: CPT

## 2025-03-18 PROCEDURE — 72020 X-RAY EXAM OF SPINE 1 VIEW: CPT

## 2025-03-18 PROCEDURE — 94799 UNLISTED PULMONARY SVC/PX: CPT

## 2025-03-18 PROCEDURE — 82948 REAGENT STRIP/BLOOD GLUCOSE: CPT

## 2025-03-18 PROCEDURE — 84484 ASSAY OF TROPONIN QUANT: CPT | Performed by: STUDENT IN AN ORGANIZED HEALTH CARE EDUCATION/TRAINING PROGRAM

## 2025-03-18 PROCEDURE — 94664 DEMO&/EVAL PT USE INHALER: CPT

## 2025-03-18 RX ORDER — AMLODIPINE BESYLATE 5 MG/1
10 TABLET ORAL ONCE
Status: COMPLETED | OUTPATIENT
Start: 2025-03-18 | End: 2025-03-18

## 2025-03-18 RX ORDER — OXYCODONE HYDROCHLORIDE 5 MG/1
5 TABLET ORAL EVERY 6 HOURS PRN
Refills: 0 | Status: DISPENSED | OUTPATIENT
Start: 2025-03-18 | End: 2025-03-23

## 2025-03-18 RX ORDER — LOSARTAN POTASSIUM 25 MG/1
25 TABLET ORAL
Status: DISCONTINUED | OUTPATIENT
Start: 2025-03-18 | End: 2025-03-19

## 2025-03-18 RX ADMIN — Medication 10 ML: at 22:12

## 2025-03-18 RX ADMIN — OXYCODONE 5 MG: 5 TABLET ORAL at 15:16

## 2025-03-18 RX ADMIN — LOSARTAN POTASSIUM 25 MG: 25 TABLET, FILM COATED ORAL at 11:32

## 2025-03-18 RX ADMIN — AMLODIPINE BESYLATE 10 MG: 5 TABLET ORAL at 11:31

## 2025-03-18 RX ADMIN — Medication 5 MG: at 22:12

## 2025-03-18 RX ADMIN — Medication 10 MG: at 08:45

## 2025-03-18 RX ADMIN — ESCITALOPRAM 20 MG: 10 TABLET, FILM COATED ORAL at 08:45

## 2025-03-18 RX ADMIN — CLOPIDOGREL BISULFATE 75 MG: 75 TABLET ORAL at 08:45

## 2025-03-18 RX ADMIN — LEVOTHYROXINE SODIUM 175 MCG: 175 TABLET ORAL at 08:45

## 2025-03-18 RX ADMIN — ACETAMINOPHEN 650 MG: 325 TABLET, FILM COATED ORAL at 11:32

## 2025-03-18 RX ADMIN — IPRATROPIUM BROMIDE AND ALBUTEROL SULFATE 3 ML: .5; 3 SOLUTION RESPIRATORY (INHALATION) at 08:00

## 2025-03-18 RX ADMIN — Medication 10 ML: at 08:46

## 2025-03-18 RX ADMIN — ASPIRIN 81 MG: 81 TABLET, COATED ORAL at 08:45

## 2025-03-18 RX ADMIN — OXYCODONE 5 MG: 5 TABLET ORAL at 22:12

## 2025-03-18 NOTE — PLAN OF CARE
Problem: Adult Inpatient Plan of Care  Goal: Plan of Care Review  Outcome: Progressing  Goal: Patient-Specific Goal (Individualized)  Outcome: Progressing  Goal: Absence of Hospital-Acquired Illness or Injury  Outcome: Progressing  Intervention: Identify and Manage Fall Risk  Recent Flowsheet Documentation  Taken 3/18/2025 1000 by Ro Ocampo RN  Safety Promotion/Fall Prevention:   safety round/check completed   activity supervised   assistive device/personal items within reach   clutter free environment maintained   fall prevention program maintained   gait belt   nonskid shoes/slippers when out of bed  Taken 3/18/2025 0800 by Ro Ocampo RN  Safety Promotion/Fall Prevention:   safety round/check completed   activity supervised   assistive device/personal items within reach   clutter free environment maintained   fall prevention program maintained   gait belt   nonskid shoes/slippers when out of bed  Intervention: Prevent Skin Injury  Recent Flowsheet Documentation  Taken 3/18/2025 1000 by Ro Ocampo RN  Body Position: position changed independently  Taken 3/18/2025 0800 by Ro Ocampo RN  Body Position:   turned   left  Skin Protection: transparent dressing maintained  Intervention: Prevent and Manage VTE (Venous Thromboembolism) Risk  Recent Flowsheet Documentation  Taken 3/18/2025 0800 by Ro Ocampo RN  VTE Prevention/Management:   bilateral   SCDs (sequential compression devices) on  Intervention: Prevent Infection  Recent Flowsheet Documentation  Taken 3/18/2025 0800 by Ro Ocampo RN  Infection Prevention:   hand hygiene promoted   rest/sleep promoted  Goal: Optimal Comfort and Wellbeing  Outcome: Progressing  Intervention: Provide Person-Centered Care  Recent Flowsheet Documentation  Taken 3/18/2025 0800 by Ro Ocampo RN  Trust Relationship/Rapport:   care explained   choices provided  Goal: Readiness for Transition of Care  Outcome: Progressing     Problem: Skin Injury Risk  Increased  Goal: Skin Health and Integrity  Outcome: Progressing  Intervention: Optimize Skin Protection  Recent Flowsheet Documentation  Taken 3/18/2025 1000 by Ro Ocampo, RN  Activity Management: up in chair  Taken 3/18/2025 0800 by Ro Ocampo, RN  Activity Management: activity encouraged  Pressure Reduction Techniques:   frequent weight shift encouraged   heels elevated off bed  Head of Bed (HOB) Positioning: HOB at 30 degrees  Pressure Reduction Devices: pressure-redistributing mattress utilized  Skin Protection: transparent dressing maintained   Goal Outcome Evaluation:  Patient up in chair. Pt son at bedside. Denies needs at this time.

## 2025-03-18 NOTE — PLAN OF CARE
Goal Outcome Evaluation:  Plan of Care Reviewed With: patient           Outcome Evaluation: 74 y.o. female with a CMH of hypothyroidism, anxiety/depression, hx of stroke ~15 years ago who presented on 3/17 with left-sided weakness. On arrival to the ED, her blood pressure was noted to be 220/120 troponin of 21 and glucose 118. tPA was not given on arrival. Per Neuro, pt with Chronic lacunar infarct in the posterior left putamen and Acute infarct within the lateral right thalamus likely 2/2 HTN. At baseline, pt lives in with her son and DIL in a [facility] with [address] [facility]. Pt was independent with ambulation in home and community and denied AD use prior to admission. Pt able to complete all ADLs independently at baseline. This date, pt required Marie to roll left and for supine to sit. Pt required Marie-CGA for static sitting balance and Max A to come to stand from EOB. Pt with frequent L knee buckling in standing. Pt completed transfer from bed to chair requiring MaxA x2 with sarita stedy. Pt with L trun lean when in sarita stedy and reauired assist to manage LUE. Pt with desaturation during activity to mid 80's on RA but recovered quickly to 96% SpO2 with seated rest break. Due to assist level required and significant decline from independent baseline mobility, recommending Inpatient Rehab at d/c. Pt will follow and progress as tolerated.    Anticipated Discharge Disposition (PT): inpatient rehabilitation facility

## 2025-03-18 NOTE — THERAPY EVALUATION
Patient Name: Lexi Gilbert  : 1950    MRN: [id number]                              Today's Date: 3/18/2025       Admit Date: 3/17/2025    Visit Dx:     ICD-10-CM ICD-9-CM   1. Accelerated hypertension  I10 401.0   2. Weakness  R53.1 780.79   3. Fall, initial encounter  W19.XXXA E888.9     Patient Active Problem List   Diagnosis    Depression    Dysfunction of eustachian tube    Gastroesophageal reflux disease    Hyperlipidemia    Hypertension    Hypothyroidism    Menopause present    Vitamin D deficiency    Pre-op evaluation    Osteoarthritis of right knee    Smoker    COPD (chronic obstructive pulmonary disease)    CVA (cerebral vascular accident)    Calculus of ureter    Accelerated hypertension     Past Medical History:   Diagnosis Date    Arthritis     right knee, sched scope    Bronchitis, chronic     CVA (cerebral infarction)     5 years ago    Hypertension     Hypothyroidism     IBS (irritable bowel syndrome)     Stroke 2011     Past Surgical History:   Procedure Laterality Date    BELPHAROPTOSIS REPAIR Bilateral     HYSTERECTOMY      KNEE ARTHROSCOPY Right 2016    Procedure: KNEE ARTHROSCOPY debride of mmt  chondraplasty mfc/ lfc and patella;  Surgeon: Danie Crowell MD;  Location: [facility];  Service:     MOUTH SURGERY        General Information       Row Name 25 1342          OT Time and Intention    Document Type evaluation  -DT     Mode of Treatment occupational therapy  -DT     Patient Effort excellent  -DT       Row Name 25 1348          General Information    Patient Profile Reviewed yes  -DT     Prior Level of Function independent:;all household mobility;ADL's;community mobility;driving;home management  Pt does not use any AD or O2. She does own a SPC  -DT     Existing Precautions/Restrictions fall;oxygen therapy device and L/min  Pt has had a fall this encounter.  -DT     Barriers to Rehab none identified  -DT       Row Name 25 1348          Living Environment     Current Living Arrangements home  -DT     People in Home child(levi), adult  son & DIL.  -DT       Row Name 03/18/25 1342          Home Main Entrance    Number of Stairs, Main Entrance one  -DT       Row Name 03/18/25 1342          Stairs Within Home, Primary    Number of Stairs, Within Home, Primary none  -DT       Row Name 03/18/25 1342          Cognition    Orientation Status (Cognition) oriented x 4  -DT       Row Name 03/18/25 1342          Safety Issues/Impairments Affecting Functional Mobility    Impairments Affecting Function (Mobility) balance;endurance/activity tolerance;strength;range of motion (ROM);coordination;grasp;sensation/sensory awareness  -DT               User Key  (r) = Recorded By, (t) = Taken By, (c) = Cosigned By      Initials Name Provider Type    DT Ai Cedillo, OT Occupational Therapist                     Mobility/ADL's       Row Name 03/18/25 1345          Bed Mobility    Bed Mobility bed mobility (all) activities;rolling left;rolling right;supine-sit  -DT     All Activities, [place] (Bed Mobility) minimum assist (75% patient effort)  -DT     Rolling Left [place] (Bed Mobility) minimum assist (75% patient effort)  -DT     Supine-Sit [place] (Bed Mobility) moderate assist (50% patient effort)  -DT       Row Name 03/18/25 1345          Transfers    Transfers bed-chair transfer;sit-stand transfer;stand-sit transfer  -DT       Row Name 03/18/25 1345          Bed-Chair Transfer    Bed-Chair [place] (Transfers) maximum assist (25% patient effort);2 person assist  -DT     Assistive Device (Bed-Chair Transfers) walker, front-wheeled  -DT       Row Name 03/18/25 1345          Sit-Stand Transfer    Sit-Stand [place] (Transfers) maximum assist (25% patient effort)  -DT     Assistive Device (Sit-Stand Transfers) walker, front-wheeled  -DT       Row Name 03/18/25 1345          Stand-Sit Transfer    Stand-Sit [place] (Transfers) maximum assist (25% patient  effort)  -DT     Assistive Device (Stand-Sit Transfers) walker, front-wheeled  -DT       Row Name 03/18/25 1345          Functional Mobility    Patient was able to Ambulate no, other medical factors prevent ambulation  -DT     Reason Patient was unable to Ambulate Excessive Weakness  -DT       Row Name 03/18/25 1345          Activities of Daily Living    BADL Assessment/Intervention other (see comments)  Pt requiring assist for bathing, dressing, toileting, ADLs due to dec standing balance, endurance, left side weakness & numbness, dec grasp strength.  -DT               User Key  (r) = Recorded By, (t) = Taken By, (c) = Cosigned By      Initials Name Provider Type    DT Ai Cedillo, OT Occupational Therapist                   Obj/Interventions       Row Name 03/18/25 1405          Sensory Assessment (Somatosensory)    Sensory Assessment (Somatosensory) other (see comments)  -DT     Sensory Assessment Pt reports dec sensation/numbness in LUE  -DT       Row Name 03/18/25 1405          Vision Assessment/Intervention    Visual Impairment/Limitations WFL;corrective lenses full-time  -DT       Row Name 03/18/25 1405          Range of Motion Comprehensive    General Range of Motion bilateral upper extremity ROM WFL  -DT     Comment, General Range of Motion left shoulder flex/ext (approx 45*; elbow & grasp = min deficits in flex, RUE = grossly 4/5  -DT       Row Name 03/18/25 1405          Strength Comprehensive (MMT)    General Manual Muscle Testing (MMT) Assessment upper extremity strength deficits identified;lower extremity strength deficits identified  -DT     Comment, General Manual Muscle Testing (MMT) Assessment LUE elbow & grasp = 3-/5.  -DT       Row Name 03/18/25 1405          Motor Skills    Motor Skills functional endurance  -DT     Functional Endurance fair. Pt c/o inc general fatigue  -DT       Row Name 03/18/25 1405          Balance    Balance Assessment sitting static balance;sitting dynamic  balance;standing static balance;standing dynamic balance  -DT     Static Sitting Balance standby assist  -DT     Dynamic Sitting Balance contact guard  -DT     Position, Sitting Balance unsupported;sitting edge of bed  -DT     Static Standing Balance maximum assist  -DT     Dynamic Standing Balance maximum assist;2-person assist  -DT     Position/Device Used, Standing Balance supported;walker, front-wheeled  -DT               User Key  (r) = Recorded By, (t) = Taken By, (c) = Cosigned By      Initials Name Provider Type    DT Ai Cedillo, OT Occupational Therapist                   Goals/Plan       Row Name 03/18/25 1412          Transfer Goal 1 (OT)    Activity/Assistive Device (Transfer Goal 1, OT) transfers, all  -DT     [place] Level/Cues Needed (Transfer Goal 1, OT) moderate assist (50-74% patient effort)  -DT     Time Frame (Transfer Goal 1, OT) long term goal (LTG);2 weeks  -DT       Row Name 03/18/25 1412          Dressing Goal 1 (OT)    Activity/Device (Dressing Goal 1, OT) lower body dressing  -DT     [place]/Cues Needed (Dressing Goal 1, OT) minimum assist (75% or more patient effort)  -DT     Time Frame (Dressing Goal 1, OT) long term goal (LTG);2 weeks  -DT       Row Name 03/18/25 1412          Toileting Goal 1 (OT)    Activity/Device (Toileting Goal 1, OT) toileting skills, all  -DT     [place] Level/Cues Needed (Toileting Goal 1, OT) minimum assist (75% or more patient effort)  -DT     Time Frame (Toileting Goal 1, OT) long term goal (LTG);2 weeks  -DT       Row Name 03/18/25 1412          Therapy Assessment/Plan (OT)    Planned Therapy Interventions (OT) activity tolerance training;BADL retraining;functional balance retraining;neuromuscular control/coordination retraining;occupation/activity based interventions;patient/caregiver education/training;ROM/therapeutic exercise;strengthening exercise;transfer/mobility retraining  -DT               User Key  (r) = Recorded By, (t)  = Taken By, (c) = Cosigned By      Initials Name Provider Type    DT Ai Cedillo, OT Occupational Therapist                   Clinical Impression       Row Name 03/18/25 1403          Pain Assessment    Pain Location back;buttock  -DT     Pain Side/Orientation lower  -DT     Additional Documentation Pain Scale: FACES Pre/Post-Treatment (Group)  -DT       Row Name 03/18/25 1401          Pain Scale: FACES Pre/Post-Treatment    Pain: FACES Scale, Pretreatment 2-->hurts little bit  -DT     Posttreatment Pain Rating 2-->hurts little bit  -DT       Row Name 03/18/25 1402          Plan of Care Review    Plan of Care Reviewed With patient  -DT     Outcome Evaluation Pt is a 74 y.o. F adm to [facility] with c/o left side weakness, LUE numbness & general weakness causing fall at home. Pt c/o dizziness prior to episode & was also unable to get up on her own. On arrival in ED, her BP was 220/120. MRI: PMH: arthritis and previous stroke  (2010 affecting right side with no long term deficits), anxiety, depression.  On arrival to the ED, her blood pressure was noted to be 220/120. Pt adm with acute left sided weakness; chronic lacunar infarct in the posterior left putamen; acute infarct within the lateral right thalamus likely 2/2 HTN. At baseline, pt lives with son & DIL in a H with 1 GILBERT. She reports she is ind with all BADLs, does all cooking, her own laundry & helps with cleaning dishes. She ambulates independently with an AD, but does own a SPC. Upon eval, pt is A&O X4. She has dec LUE/LLE strength & reports dec sensation in LUE. Pt has hx of chronic left shoulder ROM deficits & demo approx 45 degree AROM flex/abd this date. She requires assistance for bed mobilty (min-mod A), ADL transfers (max A X2) & dressing, bathing & toileting tasks. She is functioning significantly below her baseline status. OT will continue to follow for tx & recommends inpt rehab upon discharge.  -DT       Row Name 03/18/25 6940           Therapy Assessment/Plan (OT)    Rehab Potential (OT) good  -DT     Criteria for Skilled Therapeutic Interventions Met (OT) yes;meets criteria;skilled treatment is necessary  -DT     Therapy Frequency (OT) 5 times/wk  -DT     Predicted Duration of Therapy Intervention (OT) until d/c  -DT       Row Name 03/18/25 1409          Therapy Plan Review/Discharge Plan (OT)    Anticipated Discharge Disposition (OT) inpatient rehabilitation facility  -DT       Row Name 03/18/25 1409          Vital Signs    Pre Systolic BP Rehab 171  -DT     Pre Treatment Diastolic BP 82  -DT     Pretreatment Heart Rate (beats/min) 62  -DT     Pretreatment Resp Rate (breaths/min) 17  -DT     Pre SpO2 (%) 96  -DT     O2 Delivery Pre Treatment nasal cannula  2  -DT       Row Name 03/18/25 1409          Positioning and Restraints    Pre-Treatment Position in bed  -DT     Post Treatment Position chair  -DT     In Chair notified nsg;reclined;sitting;call light within reach;encouraged to call for assist;exit alarm on;legs elevated  -DT               User Key  (r) = Recorded By, (t) = Taken By, (c) = Cosigned By      Initials Name Provider Type    DT Ai Cedillo, OT Occupational Therapist                   Outcome Measures       Row Name 03/18/25 1000 03/18/25 0800       How much help from another person do you currently need...    Turning from your back to your side while in flat bed without using bedrails? 3  -JL 3  -JL    Moving from lying on back to sitting on the side of a flat bed without bedrails? 3  -JL 3  -JL    Moving to and from a bed to a chair (including a wheelchair)? 3  -JL 3  -JL    Standing up from a chair using your arms (e.g., wheelchair, bedside chair)? 2  -JL 2  -JL    Climbing 3-5 steps with a railing? 2  -JL 2  -JL    To walk in hospital room? 2  -JL 2  -JL    AM-PAC 6 Clicks Score (PT) 15  -JL 15  -JL    Highest Level of Mobility Goal 4 --> Transfer to chair/commode  -JL 4 --> Transfer to chair/commode  -JL      Ana Lilia  Name 03/18/25 1413          Modified Pierce Scale    Pre-Stroke Modified Pierce Scale 0 - No Symptoms at all.  -DT     Modified Pierce Scale 4 - Moderately severe disability.  Unable to walk without assistance, and unable to attend to own bodily needs without assistance.  -DT       Row Name 03/18/25 1413          Functional Assessment    Outcome Measure Options Modified [place]  -DT               User Key  (r) = Recorded By, (t) = Taken By, (c) = Cosigned By      Initials Name Provider Type    DT Ai Cedillo, OT Occupational Therapist    Ro Meraz RN Registered Nurse                    Occupational Therapy Education       Title: PT OT SLP Therapies (Done)       Topic: Occupational Therapy (Done)       Point: ADL training (Done)       Learning Progress Summary            Patient Acceptance, E,TB, VU by DT at 3/18/2025 1413    Comment: Role of OT, goals & POC, safety prec, stroke education                      Point: Home exercise program (Done)       Learning Progress Summary            Patient Acceptance, E,TB, VU by DT at 3/18/2025 1413    Comment: Role of OT, goals & POC, safety prec, stroke education                      Point: Precautions (Done)       Learning Progress Summary            Patient Acceptance, E,TB, VU by DT at 3/18/2025 1413    Comment: Role of OT, goals & POC, safety prec, stroke education                      Point: Body mechanics (Done)       Learning Progress Summary            Patient Acceptance, E,TB, VU by DT at 3/18/2025 1413    Comment: Role of OT, goals & POC, safety prec, stroke education                                      User Key       Initials Effective Dates Name Provider Type Discipline    DT 07/11/23 -  Ai Cedillo, OT Occupational Therapist OT                  OT Recommendation and Plan  Planned Therapy Interventions (OT): activity tolerance training, BADL retraining, functional balance retraining, neuromuscular control/coordination retraining,  occupation/activity based interventions, patient/caregiver education/training, ROM/therapeutic exercise, strengthening exercise, transfer/mobility retraining  Therapy Frequency (OT): 5 times/wk  Plan of Care Review  Plan of Care Reviewed With: patient  Outcome Evaluation: Pt is a 74 y.o. F adm to [facility] with c/o left side weakness, LUE numbness & general weakness causing fall at home. Pt c/o dizziness prior to episode & was also unable to get up on her own. On arrival in ED, her BP was 220/120. MRI: PMH: arthritis and previous stroke  (2010 affecting right side with no long term deficits), anxiety, depression.  On arrival to the ED, her blood pressure was noted to be 220/120. Pt adm with acute left sided weakness; chronic lacunar infarct in the posterior left putamen; acute infarct within the lateral right thalamus likely 2/2 HTN. At baseline, pt lives with son & DIL in a H with 1 GILBERT. She reports she is ind with all BADLs, does all cooking, her own laundry & helps with cleaning dishes. She ambulates independently with an AD, but does own a SPC. Upon eval, pt is A&O X4. She has dec LUE/LLE strength & reports dec sensation in LUE. Pt has hx of chronic left shoulder ROM deficits & demo approx 45 degree AROM flex/abd this date. She requires assistance for bed mobilty (min-mod A), ADL transfers (max A X2) & dressing, bathing & toileting tasks. She is functioning significantly below her baseline status. OT will continue to follow for tx & recommends inpt rehab upon discharge.     Time Calculation:         Time Calculation- OT       Row Name 03/18/25 1414             Time Calculation- OT    OT Start Time 0923  -DT      OT Stop Time 0957  -DT      OT Time Calculation (min) 34 min  -DT      OT Received On 03/18/25  -DT      OT - Next Appointment 03/19/25  -DT      OT Goal Re-Cert Due Date 04/01/25  -DT                User Key  (r) = Recorded By, (t) = Taken By, (c) = Cosigned By      Initials Name Provider Type    DT  Mamadou, Ai Cardona, OT Occupational Therapist                           Ai Cedillo, OT  3/18/2025

## 2025-03-18 NOTE — PLAN OF CARE
Goal Outcome Evaluation:  Plan of Care Reviewed With: patient           Outcome Evaluation: Pt is a 74 y.o. F adm to [facility] with c/o left side weakness, LUE numbness & general weakness causing fall at home. Pt c/o dizziness prior to episode & was also unable to get up on her own. On arrival in ED, her BP was 220/120. MRI: PMH: arthritis and previous stroke  (2010 affecting right side with no long term deficits), anxiety, depression.  On arrival to the ED, her blood pressure was noted to be 220/120. Pt adm with acute left sided weakness; chronic lacunar infarct in the posterior left putamen; acute infarct within the lateral right thalamus likely 2/2 HTN. At baseline, pt lives with son & DIL in a H with 1 GILBERT. She reports she is ind with all BADLs, does all cooking, her own laundry & helps with cleaning dishes. She ambulates independently with an AD, but does own a SPC. Upon eval, pt is A&O X4. She has dec LUE/LLE strength & reports dec sensation in LUE. Pt has hx of chronic left shoulder ROM deficits & demo approx 45 degree AROM flex/abd this date. She requires assistance for bed mobilty (min-mod A), ADL transfers (max A X2) & dressing, bathing & toileting tasks. She is functioning significantly below her baseline status. OT will continue to follow for tx & recommends inpt rehab upon discharge.    Anticipated Discharge Disposition (OT): inpatient rehabilitation facility

## 2025-03-18 NOTE — PLAN OF CARE
Problem: Adult Inpatient Plan of Care  Goal: Plan of Care Review  Outcome: Progressing  Goal: Patient-Specific Goal (Individualized)  Outcome: Progressing  Goal: Absence of Hospital-Acquired Illness or Injury  Outcome: Progressing  Intervention: Identify and Manage Fall Risk  Recent Flowsheet Documentation  Taken 3/18/2025 0600 by Debi Peterson RN  Safety Promotion/Fall Prevention:   activity supervised   assistive device/personal items within reach   clutter free environment maintained   fall prevention program maintained   lighting adjusted   nonskid shoes/slippers when out of bed   room organization consistent   safety round/check completed  Taken 3/18/2025 0400 by Debi Peterson RN  Safety Promotion/Fall Prevention:   activity supervised   assistive device/personal items within reach   clutter free environment maintained   fall prevention program maintained   safety round/check completed   room organization consistent  Taken 3/18/2025 0200 by Debi Peterson RN  Safety Promotion/Fall Prevention:   activity supervised   assistive device/personal items within reach   clutter free environment maintained   fall prevention program maintained   lighting adjusted   nonskid shoes/slippers when out of bed   room organization consistent   safety round/check completed  Taken 3/17/2025 2224 by Debi Peterson RN  Safety Promotion/Fall Prevention:   assistive device/personal items within reach   activity supervised   clutter free environment maintained   fall prevention program maintained   safety round/check completed   room organization consistent  Intervention: Prevent Skin Injury  Recent Flowsheet Documentation  Taken 3/18/2025 0400 by Debi Peterson RN  Skin Protection:   transparent dressing maintained   incontinence pads utilized  Taken 3/17/2025 2224 by Debi Peterson RN  Skin Protection: incontinence pads utilized  Intervention: Prevent Infection  Recent Flowsheet Documentation  Taken  3/18/2025 0600 by Debi Peterson, RN  Infection Prevention:   environmental surveillance performed   hand hygiene promoted   rest/sleep promoted   single patient room provided   personal protective equipment utilized   equipment surfaces disinfected  Taken 3/18/2025 0200 by Debi Peterson RN  Infection Prevention:   environmental surveillance performed   hand hygiene promoted   rest/sleep promoted   single patient room provided   personal protective equipment utilized   equipment surfaces disinfected  Taken 3/17/2025 2224 by Debi Peterson RN  Infection Prevention:   environmental surveillance performed   equipment surfaces disinfected   hand hygiene promoted   personal protective equipment utilized   rest/sleep promoted   single patient room provided  Goal: Optimal Comfort and Wellbeing  Outcome: Progressing  Intervention: Monitor Pain and Promote Comfort  Recent Flowsheet Documentation  Taken 3/17/2025 2224 by Debi Peterson RN  Pain Management Interventions: position adjusted  Intervention: Provide Person-Centered Care  Recent Flowsheet Documentation  Taken 3/17/2025 2224 by Debi Peterson RN  Trust Relationship/Rapport:   care explained   choices provided   questions answered  Goal: Readiness for Transition of Care  Outcome: Progressing   Goal Outcome Evaluation:

## 2025-03-18 NOTE — PROGRESS NOTES
[facility] MEDICINE SERVICE  DAILY PROGRESS NOTE    NAME: Lexi Gilbert  : 1950  MRN: [id number]      LOS: 0 days     PROVIDER OF SERVICE: Regino Ramos MD    Chief Complaint: Accelerated hypertension    Subjective:     Interval History:  History taken from: Patient and patient's chart     C/o left sided weakness        Review of Systems:   Review of Systems  All negative except above   Objective:     Vital Signs  Temp:  [97.8 °F (36.6 °C)-98.1 °F (36.7 °C)] 98.1 °F (36.7 °C)  Heart Rate:  [58-69] 58  Resp:  [12-28] 28  BP: (146-226)/(61-96) 152/61  Flow (L/min) (Oxygen Therapy):  [3] 3   Body mass index is 44.56 kg/m².    Physical Exam  Physical Exam  General: Alert and oriented, no acute distress.  Lungs: Clear to auscultation, nonlabored respiration.  Heart: RRR, no murmur, gallop or edema.  Abdomen: Soft, nontender, nondistended, + bowel sounds.  Neuro: alert and awake, left sided weakness        Diagnostic Data    Results from last 7 days   Lab Units 25  2250 25  0615 25  0354   WBC 10*3/mm3 9.59  --  8.83   HEMOGLOBIN g/dL 14.0  --  15.0   HEMATOCRIT % 44.7  --  47.7*   PLATELETS 10*3/mm3 245  --  253   GLUCOSE mg/dL 109*   < > 118*   CREATININE mg/dL 0.64   < > 0.67   BUN mg/dL 15   < > 19   SODIUM mmol/L 141   < > 142   POTASSIUM mmol/L 4.1   < > 4.3   AST (SGOT) U/L  --   --  19   ALT (SGPT) U/L  --   --  11   ALK PHOS U/L  --   --  81   BILIRUBIN mg/dL  --   --  0.3   ANION GAP mmol/L 9.7   < > 11.5    < > = values in this interval not displayed.       Adult Transthoracic Echo Complete W/ Cont if Necessary Per Protocol  Addendum Date: 3/17/2025    Left ventricular systolic function is normal. Calculated left ventricular EF = 60% Left ventricular ejection fraction appears to be 56 - 60%.   Left ventricular diastolic function is consistent with (grade I) impaired relaxation.  Average GLS -13.5%.   Estimated right ventricular systolic pressure from tricuspid  regurgitation is normal (<35 mmHg).   There is a small (<1cm) pericardial effusion. There is no evidence of cardiac tamponade.   No significant valvular abnormalities noted.     MRI Brain Without Contrast  Result Date: 3/17/2025  Impression: 1. Small punctate area of acute ischemia at junction of lateral right thalamus and posterior limb of right internal capsule. 2. Extensive white matter findings most compatible with moderate to severe chronic microvascular disease. Electronically Signed: Meija Adams MD  3/17/2025 4:08 PM EDT  Workstation ID: [id number]    CT Angiogram Carotids  Result Date: 3/17/2025  1.No large vessel occlusive thrombus, flow-limiting stenosis or aneurysm is seen intracranially. 2.Focal high-grade stenosis is suggested within the distal right vertebral artery at the level of the skull base just proximal to the basilar artery. 3.Mild to moderate short segment stenosis is suggested within a right M2 insular branch. Electronically Signed: Naty Haynes MD  3/17/2025 1:55 PM EDT  Workstation ID: [id number]    CT Angiogram Head  Result Date: 3/17/2025  1.No large vessel occlusive thrombus, flow-limiting stenosis or aneurysm is seen intracranially. 2.Focal high-grade stenosis is suggested within the distal right vertebral artery at the level of the skull base just proximal to the basilar artery. 3.Mild to moderate short segment stenosis is suggested within a right M2 insular branch. Electronically Signed: Naty Haynes MD  3/17/2025 1:55 PM EDT  Workstation ID: [id number]    CT Head Without Contrast  Result Date: 3/17/2025  Impression: 1.No acute intracranial abnormality. 2.Moderate chronic small vessel ischemic change and chronic lacunar infarct in the left putamen. 3.Small mastoid effusions. Electronically Signed: Femi Infante MD  3/17/2025 5:12 AM EDT  Workstation ID: [id number]    XR Chest 1 View  Result Date: 3/17/2025  Impression: Minor linear atelectasis in the left midlung. Electronically Signed:  Femi Infante MD  3/17/2025 4:19 AM EDT  Workstation ID: [id number]        I have reviewed patient labs and imaging     Assessment/Plan:     Active and Resolved Problems  # chronic lacunar infarct in the posterior left putamen   # Acute infarct within the lateral right thalamus likely 2/2 HTN  -Patient presented with left arm and left leg weakness since around 4am. She has a hx of stroke and states her symptoms are similar to her prior stroke. tPA was not given on arrival.   -Reviewed CTH - chronic small vessel ischemic disease and chronic lacunar infarct. No acute findings.   -Will obtain stat CTA head and carotids  -MRI ordered and pending at this time  -NIHSS BID and neuro-checks Q4H  -Start aspirin  -OT/PT consult  -ECHO ordered and pending  -Telemetry  -Allow permissive hypertension for now pending MRI  -Consult neurology -- appreciate further recommendations     # Hypertensive emergency  -Initial blood pressure 220/120.  She was given one dose of IV Labetalol.  Blood pressure trending down, currently 151/79.  Will hold additional antihypertensive agents for now to allow for permissive hypertension.  -Troponin trending up 21->31, likely secondary to severe hypertension. She is without chest pain.  EKG reveals LBBB which is chronic, has been present since at least 2016.  -denies any chest pain  -troponin now trending down   -start Tab Amlodipine 10 mg and Losartan 25 mg  -monitor BP and adjust medications as appropriate       # Anxiety/depression  -Continue lexapro     # Hypothyroidism  -Continue levothyroxine.       VTE Prophylaxis:  Mechanical VTE prophylaxis orders are present.             Disposition Planning:     Barriers to Discharge:medical clearance   Anticipated Date of Discharge: 03/20  Place of Discharge: likely rehab       Time: 40 minutes     Code Status and Medical Interventions: CPR (Attempt to Resuscitate); Full Support   Ordered at: 03/17/25 0559     Code Status (Patient has no pulse and is not  breathing):    CPR (Attempt to Resuscitate)     Medical Interventions (Patient has pulse or is breathing):    Full Support       Signature: Electronically signed by Regino Ramos MD, 03/18/25, 13:52 EDT.  [place] Hospitalist Team

## 2025-03-18 NOTE — THERAPY EVALUATION
Patient Name: Lexi Gilbert  : 1950    MRN: [id number]                              Today's Date: 3/18/2025       Admit Date: 3/17/2025    Visit Dx:     ICD-10-CM ICD-9-CM   1. Accelerated hypertension  I10 401.0   2. Weakness  R53.1 780.79   3. Fall, initial encounter  W19.XXXA E888.9     Patient Active Problem List   Diagnosis    Depression    Dysfunction of eustachian tube    Gastroesophageal reflux disease    Hyperlipidemia    Hypertension    Hypothyroidism    Menopause present    Vitamin D deficiency    Pre-op evaluation    Osteoarthritis of right knee    Smoker    COPD (chronic obstructive pulmonary disease)    CVA (cerebral vascular accident)    Calculus of ureter    Accelerated hypertension     Past Medical History:   Diagnosis Date    Arthritis     right knee, sched scope    Bronchitis, chronic     CVA (cerebral infarction)     5 years ago    Hypertension     Hypothyroidism     IBS (irritable bowel syndrome)     Stroke 2011     Past Surgical History:   Procedure Laterality Date    BELPHAROPTOSIS REPAIR Bilateral     HYSTERECTOMY      KNEE ARTHROSCOPY Right 2016    Procedure: KNEE ARTHROSCOPY debride of mmt  chondraplasty mfc/ lfc and patella;  Surgeon: Danie Crowell MD;  Location: [facility];  Service:     MOUTH SURGERY        General Information       Row Name 25 1625          Physical Therapy Time and Intention    Document Type evaluation  -SS (r) AD (t) SS (c)     Mode of Treatment physical therapy  -SS (r) AD (t) SS (c)       Row Name 25 162          General Information    Patient Profile Reviewed yes  -SS (r) AD (t) SS (c)     Prior Level of Function independent:;all household mobility;community mobility;gait;bed mobility;ADL's  -SS (r) AD (t) SS (c)     Existing Precautions/Restrictions fall  pt with fall this encounter  -SS (r) AD (t) SS (c)     Barriers to Rehab none identified  -SS (r) AD (t) SS (c)       Row Name 25 8774          Living Environment    Current  Living Arrangements home  -SS (r) AD (t) SS (c)     People in Home child(levi), adult  son and dtr-in-law  -SS (r) AD (t) SS (c)       Row Name 03/18/25 1625          Cognition    Orientation Status (Cognition) oriented x 4  -SS (r) AD (t) SS (c)       Row Name 03/18/25 1625          Safety Issues/Impairments Affecting Functional Mobility    Impairments Affecting Function (Mobility) balance;endurance/activity tolerance;motor control;motor planning;pain;postural/trunk control;range of motion (ROM);shortness of breath;strength  -SS (r) AD (t) SS (c)               User Key  (r) = Recorded By, (t) = Taken By, (c) = Cosigned By      Initials Name Provider Type    Michelle Molina, PT Physical Therapist    Tabatha Yanez PT Student PT Student                   Mobility       Row Name 03/18/25 1629          Bed Mobility    Bed Mobility rolling left;supine-sit  -SS (r) AD (t) SS (c)     Rolling Left [place] (Bed Mobility) minimum assist (75% patient effort)  -SS (r) AD (t) SS (c)     Supine-Sit [place] (Bed Mobility) moderate assist (50% patient effort)  -SS (r) AD (t) SS (c)     Assistive Device (Bed Mobility) bed rails  -SS (r) AD (t) SS (c)       Row Name 03/18/25 1629          Bed-Chair Transfer    Bed-Chair [place] (Transfers) maximum assist (25% patient effort);2 person assist  -SS (r) AD (t) SS (c)     Assistive Device (Bed-Chair Transfers) lift device  sarita stedy  -SS (r) AD (t) SS (c)       Row Name 03/18/25 1629          Sit-Stand Transfer    Sit-Stand [place] (Transfers) minimum assist (75% patient effort)  -SS (r) AD (t) SS (c)       Row Name 03/18/25 1629          Gait/Stairs (Locomotion)    Patient was able to Ambulate no, other medical factors prevent ambulation  -SS (r) AD (t) SS (c)     Reason Patient was unable to Ambulate --  weakness and L knee buckling  -SS (r) AD (t) SS (c)               User Key  (r) = Recorded By, (t) = Taken By, (c) = Cosigned By      Initials Name  Provider Type    SS Michelle Bob, PT Physical Therapist    Tabatha Yanez, PT Student PT Student                   Obj/Interventions       Row Name 03/18/25 1631          Range of Motion Comprehensive    General Range of Motion lower extremity range of motion deficits identified  -SS (r) AD (t) SS (c)     Comment, General Range of Motion LLE AROM impaired, RLE WFL  -SS (r) AD (t) SS (c)       Row Name 03/18/25 1631          Strength Comprehensive (MMT)    General Manual Muscle Testing (MMT) Assessment lower extremity strength deficits identified  -SS (r) AD (t) SS (c)     Comment, General Manual Muscle Testing (MMT) Assessment LLE strength 3/5, RLE 4/5 per functional assessment  -SS (r) AD (t) SS (c)       Row Name 03/18/25 1631          Motor Skills    Motor Skills functional endurance  -SS (r) AD (t) SS (c)     Functional Endurance fair, pt with subjective complains of SOA and increased RR  -SS (r) AD (t) SS (c)       Row Name 03/18/25 1631          Balance    Balance Assessment sitting static balance;standing static balance  -SS (r) AD (t) SS (c)     Static Sitting Balance minimal assist;verbal cues  -SS (r) AD (t) SS (c)     Position, Sitting Balance sitting edge of bed  -SS (r) AD (t) SS (c)     Static Standing Balance maximum assist  -SS (r) AD (t) SS (c)       Row Name 03/18/25 1631          Sensory Assessment (Somatosensory)    Sensory Assessment (Somatosensory) LE sensation intact  -SS (r) AD (t) SS (c)               User Key  (r) = Recorded By, (t) = Taken By, (c) = Cosigned By      Initials Name Provider Type    SS Michelle Bob, PT Physical Therapist    Tabatha Yanez, PT Student PT Student                   Goals/Plan       Row Name 03/18/25 1655          Bed Mobility Goal 1 (PT)    Activity/Assistive Device (Bed Mobility Goal 1, PT) bed mobility activities, all  -SS (r) AD (t) SS (c)     Coffee Level/Cues Needed (Bed Mobility Goal 1, PT) modified independence  -SS (r) AD (t) SS (c)      Time Frame (Bed Mobility Goal 1, PT) long term goal (LTG);2 weeks  -SS (r) AD (t) SS (c)       Row Name 03/18/25 1653          Transfer Goal 1 (PT)    Activity/Assistive Device (Transfer Goal 1, PT) transfers, all  -SS (r) AD (t) SS (c)     [place] Level/Cues Needed (Transfer Goal 1, PT) contact guard required  -SS (r) AD (t) SS (c)     Time Frame (Transfer Goal 1, PT) long term goal (LTG);2 weeks  -SS (r) AD (t) SS (c)       Row Name 03/18/25 1655          Gait Training Goal 1 (PT)    Activity/Assistive Device (Gait Training Goal 1, PT) gait (walking locomotion);walker, rolling  -SS (r) AD (t) SS (c)     [place] Level (Gait Training Goal 1, PT) minimum assist (75% or more patient effort);contact guard required  -SS (r) AD (t) SS (c)     Distance (Gait Training Goal 1, PT) 40 ft  -SS (r) AD (t) SS (c)     Time Frame (Gait Training Goal 1, PT) long term goal (LTG);2 weeks  -SS (r) AD (t) SS (c)       Row Name 03/18/25 4447          Therapy Assessment/Plan (PT)    Planned Therapy Interventions (PT) balance training;bed mobility training;gait training;home exercise program;transfer training;strengthening;patient/family education;neuromuscular re-education;postural re-education;ROM (range of motion)  -SS (r) AD (t) SS (c)               User Key  (r) = Recorded By, (t) = Taken By, (c) = Cosigned By      Initials Name Provider Type    SS Michelle Bob, PT Physical Therapist    Tabatha Yanez, PT Student PT Student                   Clinical Impression       Row Name 03/18/25 0264          Pain    Additional Documentation Pain Scale: FACES Pre/Post-Treatment (Group)  -SS (r) AD (t) SS (c)       Row Name 03/18/25 1413          Pain Scale: FACES Pre/Post-Treatment    Pain: FACES Scale, Pretreatment 4-->hurts little more  -SS (r) AD (t) SS (c)     Posttreatment Pain Rating 4-->hurts little more  -SS (r) AD (t) SS (c)       Row Name 03/18/25 9682          Plan of Care Review    Plan of Care Reviewed With  patient  -SS (r) AD (t) SS (c)     Outcome Evaluation 74 y.o. female with a CMH of hypothyroidism, anxiety/depression, hx of stroke ~15 years ago who presented on 3/17 with left-sided weakness. On arrival to the ED, her blood pressure was noted to be 220/120 troponin of 21 and glucose 118. tPA was not given on arrival. Per Neuro, pt with Chronic lacunar infarct in the posterior left putamen and Acute infarct within the lateral right thalamus likely 2/2 HTN. At baseline, pt lives in with her son and DIL in a [facility] with 1 GILBERT. Pt was independent with ambulation in home and community and denied AD use prior to admission. Pt able to complete all ADLs independently at baseline. This date, pt required Marie to roll left and for supine to sit. Pt required Marie-CGA for static sitting balance and Max A to come to stand from EOB. Pt with frequent L knee buckling in standing. Pt completed transfer from bed to chair requiring MaxA x2 with sarita stedy. Pt with L trun lean when in sarita stedy and reauired assist to manage LUE. Pt with desaturation during activity to mid 80's on RA but recovered quickly to 96% SpO2 with seated rest break. Due to assist level required and significant decline from independent baseline mobility, recommending Inpatient Rehab at d/c. Pt will follow and progress as tolerated.  -SS (r) AD (t) SS (c)       Row Name 03/18/25 2785          Therapy Assessment/Plan (PT)    Rehab Potential (PT) good  -SS (r) AD (t) SS (c)     Criteria for Skilled Interventions Met (PT) yes;meets criteria;skilled treatment is necessary  -SS (r) AD (t) SS (c)     Therapy Frequency (PT) 5 times/wk  -SS (r) AD (t) SS (c)     Predicted Duration of Therapy Intervention (PT) until d/c  -SS (r) AD (t) SS (c)       Row Name 03/18/25 1635          Vital Signs    Pre SpO2 (%) 94  -SS (r) AD (t) SS (c)     O2 Delivery Pre Treatment room air  -SS (r) AD (t) SS (c)     Post SpO2 (%) 96  -SS (r) AD (t) SS (c)     O2 Delivery Post Treatment room  air  -SS (r) AD (t) SS (c)       Row Name 03/18/25 1635          Positioning and Restraints    Pre-Treatment Position in bed  -SS (r) AD (t) SS (c)     Post Treatment Position chair  -SS (r) AD (t) SS (c)     In Chair notified nsg;reclined;call light within reach;encouraged to call for assist;exit alarm on  -SS (r) AD (t) SS (c)               User Key  (r) = Recorded By, (t) = Taken By, (c) = Cosigned By      Initials Name Provider Type    SS Michelle Bob, PT Physical Therapist    Tabatha Yanez, PT Student PT Student                   Outcome Measures       Row Name 03/18/25 1658 03/18/25 1000       How much help from another person do you currently need...    Turning from your back to your side while in flat bed without using bedrails? 3  -SS (r) AD (t) SS (c) 3  -JL    Moving from lying on back to sitting on the side of a flat bed without bedrails? 3  -SS (r) AD (t) SS (c) 3  -JL    Moving to and from a bed to a chair (including a wheelchair)? 2  -SS (r) AD (t) SS (c) 3  -JL    Standing up from a chair using your arms (e.g., wheelchair, bedside chair)? 2  -SS (r) AD (t) SS (c) 2  -JL    Climbing 3-5 steps with a railing? 1  -SS (r) AD (t) SS (c) 2  -JL    To walk in hospital room? 1  -SS (r) AD (t) SS (c) 2  -JL    AM-PAC 6 Clicks Score (PT) 12  -SS (r) AD (t) 15  -JL    Highest Level of Mobility Goal 4 --> Transfer to chair/commode  -SS (r) AD (t) 4 --> Transfer to chair/commode  -JL      Row Name 03/18/25 0800          How much help from another person do you currently need...    Turning from your back to your side while in flat bed without using bedrails? 3  -JL     Moving from lying on back to sitting on the side of a flat bed without bedrails? 3  -JL     Moving to and from a bed to a chair (including a wheelchair)? 3  -JL     Standing up from a chair using your arms (e.g., wheelchair, bedside chair)? 2  -JL     Climbing 3-5 steps with a railing? 2  -JL     To walk in hospital room? 2  -JL     AM-PAC  6 Clicks Score (PT) 15  -     Highest Level of Mobility Goal 4 --> Transfer to chair/commode  -JL       Row Name 03/18/25 1658 03/18/25 1413       Modified Tyler Scale    Pre-Stroke Modified Pier[place] Scale 0 - No Symptoms at all.  -SS (r) AD (t) SS (c) 0 - No Symptoms at all.  -DT    Modified [place] Scale 4 - Moderately severe disability.  Unable to walk without assistance, and unable to attend to own bodily needs without assistance.  -SS (r) AD (t) SS (c) 4 - Moderately severe disability.  Unable to walk without assistance, and unable to attend to own bodily needs without assistance.  -DT      Row Name 03/18/25 1413          Functional Assessment    Outcome Measure Options Modified Tyler  -DT               User Key  (r) = Recorded By, (t) = Taken By, (c) = Cosigned By      Initials Name Provider Type    SS Michelle Bob, PT Physical Therapist    Ai Norton, OT Occupational Therapist    Ro Meraz, RN Registered Nurse    Tabatha Yanez, PT Student PT Student                                 Physical Therapy Education       Title: PT OT SLP Therapies (Done)       Topic: Physical Therapy (Done)       Point: Mobility training (Done)       Learning Progress Summary            Patient Acceptance, E, VU by AD at 3/18/2025 1657                                      User Key       Initials Effective Dates Name Provider Type Discipline    AD 01/28/25 -  Tabatha Gunn, PT Student PT Student PT                  PT Recommendation and Plan  Planned Therapy Interventions (PT): balance training, bed mobility training, gait training, home exercise program, transfer training, strengthening, patient/family education, neuromuscular re-education, postural re-education, ROM (range of motion)  Outcome Evaluation: 74 y.o. female with a CMH of hypothyroidism, anxiety/depression, hx of stroke ~15 years ago who presented on 3/17 with left-sided weakness. On arrival to the ED, her blood pressure was noted to be  220/120 troponin of 21 and glucose 118. tPA was not given on arrival. Per Neuro, pt with Chronic lacunar infarct in the posterior left putamen and Acute infarct within the lateral right thalamus likely 2/2 HTN. At baseline, pt lives in with her son and DIL in a [place] with 1 GILBERT. Pt was independent with ambulation in home and community and denied AD use prior to admission. Pt able to complete all ADLs independently at baseline. This date, pt required Marie to roll left and for supine to sit. Pt required Marie-CGA for static sitting balance and Max A to come to stand from EOB. Pt with frequent L knee buckling in standing. Pt completed transfer from bed to chair requiring MaxA x2 with sarita stedy. Pt with L trun lean when in sarita stedy and reauired assist to manage LUE. Pt with desaturation during activity to mid 80's on RA but recovered quickly to 96% SpO2 with seated rest break. Due to assist level required and significant decline from independent baseline mobility, recommending Inpatient Rehab at d/c. Pt will follow and progress as tolerated.     Time Calculation:   PT Evaluation Complexity  History, PT Evaluation Complexity: 3 or more personal factors and/or comorbidities  Examination of Body Systems (PT Eval Complexity): total of 3 or more elements  Clinical Presentation (PT Evaluation Complexity): evolving  Clinical Decision Making (PT Evaluation Complexity): moderate complexity  Overall Complexity (PT Evaluation Complexity): moderate complexity     PT Charges       Row Name 03/18/25 1047             Time Calculation    Start Time 1445  -SS (r) AD (t) SS (c)      Stop Time 1521  -SS (r) AD (t) SS (c)      Time Calculation (min) 36 min  -SS (r) AD (t)      PT Received On 03/18/25  -SS (r) AD (t) SS (c)      PT - Next Appointment 03/19/25  -SS (r) AD (t) SS (c)      PT Goal Re-Cert Due Date 04/01/25  -SS (r) AD (t) SS (c)         Time Calculation- PT    Total Timed Code Minutes- PT 0 minute(s)  -SS (r) AD (t) SS (c)          Untimed Charges    PT Eval/Re-eval Minutes 36  -SS (r) AD (t) SS (c)         Total Minutes    Untimed Charges Total Minutes 36  -SS (r) AD (t)       Total Minutes 36  -SS (r) AD (t)                User Key  (r) = Recorded By, (t) = Taken By, (c) = Cosigned By      Initials Name Provider Type    SS Michelle Bob, PT Physical Therapist    AD Tabatha Gunn, PT Student PT Student                  Therapy Charges for Today       Code Description Service Date Service Provider Modifiers Qty    63298787498 HC PT EVAL MOD COMPLEXITY 4 3/18/2025 Tabatha Gunn, PT Student GP 1            PT G-Codes  Outcome Measure Options: Modified Tyler  AM-PAC 6 Clicks Score (PT): 12  Modified [place] Scale: 4 - Moderately severe disability.  Unable to walk without assistance, and unable to attend to own bodily needs without assistance.  PT Discharge Summary  Anticipated Discharge Disposition (PT): inpatient rehabilitation facility    JANINA Hutchins  3/18/2025

## 2025-03-18 NOTE — PROGRESS NOTES
LOS: 0 days     Chief Complaint:  left sided weakness        SUBJECTIVE:    History taken from: patient chart RN    Interval History: Lexi Gilbert was admitted on 3/17/2025 .    No acute events overnight.  Echocardiogram showing small pericardial effusion less than 1 cm.  Patient is doing well clinically.        - Portions of the above HPI were copied from previous encounters and edited as appropriate.    Patient Complaints: low back pain      Review of Systems   Constitutional:  Negative for fatigue and fever.   HENT:  Negative for ear discharge, ear pain, tinnitus, trouble swallowing and voice change.    Eyes:  Negative for photophobia, pain and visual disturbance.   Respiratory:  Negative for chest tightness and shortness of breath.    Cardiovascular:  Negative for chest pain.   Gastrointestinal:  Negative for nausea and vomiting.   Musculoskeletal:  Positive for back pain. Negative for neck pain and neck stiffness.   Neurological:  Positive for weakness. Negative for dizziness, tremors, seizures, syncope, facial asymmetry, speech difficulty, light-headedness, numbness and headaches.   All other systems reviewed and are negative.         Pertinent PMH:  has a past medical history of Arthritis, Bronchitis, chronic, CVA (cerebral infarction), Hypertension, Hypothyroidism, IBS (irritable bowel syndrome), and Stroke (12/2011).   ________________________________________________     OBJECTIVE:    GEN: NAD, pleasant, cooperative  CHEST: No signs of resp distress, on room air     NEURO  MENTAL STATUS: AAOx3, memory intact, fund of knowledge appropriate  LANG/SPEECH: Naming and repetition intact, fluent, follows 3-step commands     CRANIAL NERVES:  II-XII grossly intact     MOTOR:  Motor strength 5/5 throughout, symmetric.      REFLEXES: 2/4 throughout     SENSORY:  Normal to touch, temp all limbs  No hemineglect, no extinction to double-sided stimulation (visual & tactile)  COORD: Normal finger to nose            NIH Stroke Scale  1a. Level of Consciousness: 0-->Alert, keenly responsive  1b. LOC Questions: 0-->Answers both questions correctly  1c. LOC Commands: 0-->Performs both tasks correctly  2. Best Gaze: 0-->Normal  3. Visual: 0-->No visual loss  4. Facial Palsy: 1-->Minor paralysis (flattened nasolabial fold, asymmetry on smiling)  5a. Motor Arm, Left: 1-->Drift, limb holds 90 (or 45) degrees, but drifts down before full 10 seconds, does not hit bed or other support  5b. Motor Arm, Right: 0-->No drift, limb holds 90 (or 45) degrees for full 10 secs  6a. Motor Leg, Left: 0-->No drift, leg holds 30 degree position for full 5 secs  6b. Motor Leg, Right: 0-->No drift, leg holds 30 degree position for full 5 secs  7. Limb Ataxia: 0-->Absent  8. Sensory: 0-->Normal, no sensory loss  9. Best Language: 0-->No aphasia, normal  10. Dysarthria: 0-->Normal  11. Extinction and Inattention (formerly Neglect): 0-->No abnormality  Total (NIH Stroke Scale): 2         ________________________________________________   RESULTS REVIEW    VITAL SIGNS:  Temp:  [97.5 °F (36.4 °C)-98.1 °F (36.7 °C)] 98.1 °F (36.7 °C)  Heart Rate:  [58-69] 58  Resp:  [12-28] 20  BP: (146-226)/() 226/96    LABS:       Lab 03/17/25 2250 03/17/25  0354   WBC 9.59 8.83   HEMOGLOBIN 14.0 15.0   HEMATOCRIT 44.7 47.7*   PLATELETS 245 253   NEUTROS ABS  --  6.76   IMMATURE GRANS (ABS)  --  0.02   LYMPHS ABS  --  1.37   MONOS ABS  --  0.49   EOS ABS  --  0.16   MCV 92.2 93.0         Lab 03/17/25 2250 03/17/25  1313 03/17/25  0615 03/17/25  0354   SODIUM 141  --  142 142   POTASSIUM 4.1  --  4.0 4.3   CHLORIDE 107  --  107 107   CO2 24.3  --  24.1 23.5   ANION GAP 9.7  --  10.9 11.5   BUN 15  --  19 19   CREATININE 0.64  --  0.70 0.67   EGFR 91.7  --  89.8 90.7   GLUCOSE 109*  --  134* 118*   CALCIUM 9.6  --  9.5 9.8   TSH  --  1.650  --   --          Lab 03/17/25 0354   TOTAL PROTEIN 6.7   ALBUMIN 4.1   GLOBULIN 2.6   ALT (SGPT) 11   AST (SGOT) 19    BILIRUBIN 0.3   ALK PHOS 81         Lab 03/17/25  1313 03/17/25  0505 03/17/25  0354   HSTROP T 31* 31* 21*                     Lab Results   Component Value Date    TSH 1.650 03/17/2025     (H) 07/27/2020    [id number] 07/27/2020         IMAGING STUDIES:  Adult Transthoracic Echo Complete W/ Cont if Necessary Per Protocol  Addendum Date: 3/17/2025    Left ventricular systolic function is normal. Calculated left ventricular EF = 60% Left ventricular ejection fraction appears to be 56 - 60%.   Left ventricular diastolic function is consistent with (grade I) impaired relaxation.  Average GLS -13.5%.   Estimated right ventricular systolic pressure from tricuspid regurgitation is normal (<35 mmHg).   There is a small (<1cm) pericardial effusion. There is no evidence of cardiac tamponade.   No significant valvular abnormalities noted.     MRI Brain Without Contrast  Result Date: 3/17/2025  Impression: 1. Small punctate area of acute ischemia at junction of lateral right thalamus and posterior limb of right internal capsule. 2. Extensive white matter findings most compatible with moderate to severe chronic microvascular disease. Electronically Signed: Mejia Adams MD  3/17/2025 4:08 PM EDT  Workstation ID: [id number]    CT Angiogram Carotids  Result Date: 3/17/2025  1.No large vessel occlusive thrombus, flow-limiting stenosis or aneurysm is seen intracranially. 2.Focal high-grade stenosis is suggested within the distal right vertebral artery at the level of the skull base just proximal to the basilar artery. 3.Mild to moderate short segment stenosis is suggested within a right M2 insular branch. Electronically Signed: Naty Haynes MD  3/17/2025 1:55 PM EDT  Workstation ID: [id number]    CT Angiogram Head  Result Date: 3/17/2025  1.No large vessel occlusive thrombus, flow-limiting stenosis or aneurysm is seen intracranially. 2.Focal high-grade stenosis is suggested within the distal right vertebral artery at the  level of the skull base just proximal to the basilar artery. 3.Mild to moderate short segment stenosis is suggested within a right M2 insular branch. Electronically Signed: Naty Haynes MD  3/17/2025 1:55 PM EDT  Workstation ID: [id number]    CT Head Without Contrast  Result Date: 3/17/2025  Impression: 1.No acute intracranial abnormality. 2.Moderate chronic small vessel ischemic change and chronic lacunar infarct in the left putamen. 3.Small mastoid effusions. Electronically Signed: Femi Infante MD  3/17/2025 5:12 AM EDT  Workstation ID: [id number]    XR Chest 1 View  Result Date: 3/17/2025  Impression: Minor linear atelectasis in the left midlung. Electronically Signed: Femi Infante MD  3/17/2025 4:19 AM EDT  Workstation ID: [id number]      I reviewed the patient's new clinical results.    ________________________________________________      PROBLEM LIST:    Accelerated hypertension        ASSESSMENT/PLAN:  Acute left sided weakness  chronic lacunar infarct in the posterior left putamen   Acute infarct within the lateral right thalamus likely 2/2 HTN     NIHSS 2     Imaging reviewed  NCCT: negative  CTA H/N: no LVO. Focal high-grade stenosis is suggested within the distal right vertebral artery at the level of the skull base just proximal to the basilar artery.   Mild to moderate short segment stenosis is suggested within a right M2 insular branch.   MRI Brain WO: Small punctate area of acute ischemia at junction of lateral right thalamus and posterior limb of right internal capsule.   Baseline EKG and chest x-ray  Echocardiogram showing a small (<1cm) pericardial effusion. LVEF 56-60%.  Continuous cardiac telemetry to monitor for arrhythmia  Plavix load 300 mg once PO +  mg once PO/NV, then ASA 81 mg daily PO and Plavix 75 mg daily PO x21 days then transition to ASA 81 mg daily PO monotherapy  Permissive hypertension for 24 hours.  Labetalol 10 mg IV, may repeat x1. PRN BP >220/120 (non-tenecteplase  candidate)  Stroke labwork: HgbA1C, lipid panel, B12, TSH, urine drug screen  High intensity statin therapy for LDL >70 (to be started prior to discharge)  PT/OT/Speech/swallow consults as indicated   Please document NIHSS on admission and with any neurochanges  Strict NPO until patient passes RN stroke swallow screen or speech therapy evaluation  Oxygen therapy (titrate to keep SpO2 greater than 94%  Activity as tolerated  Stroke education provided by nursing per institutional guidelines   Outpatient follow-up in the stroke clinic.  Referral sent.  [facility] on d/c      I will sign off at this time but am happy to re-evaluate should any new neurological issues arise or should further input be deemed helpful.    Medication benefits and potential side effects were discussed with the patient and they verbalized their understanding.     Patient was instructed to immediately call 911 or return to the closest ER with any any new weakness, numbness, speech difficulty or vision disturbance.         **Please refer to previous notes for further details and recommendations.     I discussed the patient's findings and my recommendations with patient, nursing staff, and primary care team    Mary Alice Nguyễn MD  03/18/25  11:44 EDT

## 2025-03-19 LAB
ANION GAP SERPL CALCULATED.3IONS-SCNC: 12.6 MMOL/L (ref 5–15)
BUN SERPL-MCNC: 19 MG/DL (ref 8–23)
BUN/CREAT SERPL: 30.2 (ref 7–25)
CALCIUM SPEC-SCNC: 9.5 MG/DL (ref 8.6–10.5)
CHLORIDE SERPL-SCNC: 106 MMOL/L (ref 98–107)
CO2 SERPL-SCNC: 23.4 MMOL/L (ref 22–29)
CREAT SERPL-MCNC: 0.63 MG/DL (ref 0.57–1)
DEPRECATED RDW RBC AUTO: 47.9 FL (ref 37–54)
EGFRCR SERPLBLD CKD-EPI 2021: 92.1 ML/MIN/1.73
ERYTHROCYTE [DISTWIDTH] IN BLOOD BY AUTOMATED COUNT: 13.9 % (ref 12.3–15.4)
GLUCOSE SERPL-MCNC: 100 MG/DL (ref 65–99)
HCT VFR BLD AUTO: 42 % (ref 34–46.6)
HGB BLD-MCNC: 13.1 G/DL (ref 12–15.9)
MCH RBC QN AUTO: 29.2 PG (ref 26.6–33)
MCHC RBC AUTO-ENTMCNC: 31.2 G/DL (ref 31.5–35.7)
MCV RBC AUTO: 93.8 FL (ref 79–97)
PLATELET # BLD AUTO: 236 10*3/MM3 (ref 140–450)
PMV BLD AUTO: 10.9 FL (ref 6–12)
POTASSIUM SERPL-SCNC: 4.2 MMOL/L (ref 3.5–5.2)
RBC # BLD AUTO: 4.48 10*6/MM3 (ref 3.77–5.28)
SODIUM SERPL-SCNC: 142 MMOL/L (ref 136–145)
WBC NRBC COR # BLD AUTO: 7.29 10*3/MM3 (ref 3.4–10.8)

## 2025-03-19 PROCEDURE — 94799 UNLISTED PULMONARY SVC/PX: CPT

## 2025-03-19 PROCEDURE — 97110 THERAPEUTIC EXERCISES: CPT

## 2025-03-19 PROCEDURE — 97551 CAREGIVER TRAING EA ADDL 15: CPT

## 2025-03-19 PROCEDURE — 97530 THERAPEUTIC ACTIVITIES: CPT

## 2025-03-19 PROCEDURE — 85027 COMPLETE CBC AUTOMATED: CPT | Performed by: STUDENT IN AN ORGANIZED HEALTH CARE EDUCATION/TRAINING PROGRAM

## 2025-03-19 PROCEDURE — 25010000002 ENOXAPARIN PER 10 MG: Performed by: STUDENT IN AN ORGANIZED HEALTH CARE EDUCATION/TRAINING PROGRAM

## 2025-03-19 PROCEDURE — 80048 BASIC METABOLIC PNL TOTAL CA: CPT | Performed by: STUDENT IN AN ORGANIZED HEALTH CARE EDUCATION/TRAINING PROGRAM

## 2025-03-19 RX ORDER — ENOXAPARIN SODIUM 100 MG/ML
40 INJECTION SUBCUTANEOUS EVERY 24 HOURS
Status: DISCONTINUED | OUTPATIENT
Start: 2025-03-19 | End: 2025-03-26 | Stop reason: HOSPADM

## 2025-03-19 RX ORDER — AMLODIPINE BESYLATE 5 MG/1
10 TABLET ORAL DAILY
Status: DISCONTINUED | OUTPATIENT
Start: 2025-03-19 | End: 2025-03-26 | Stop reason: HOSPADM

## 2025-03-19 RX ORDER — LOSARTAN POTASSIUM 50 MG/1
50 TABLET ORAL
Status: DISCONTINUED | OUTPATIENT
Start: 2025-03-20 | End: 2025-03-20

## 2025-03-19 RX ORDER — LOSARTAN POTASSIUM 25 MG/1
25 TABLET ORAL ONCE
Status: COMPLETED | OUTPATIENT
Start: 2025-03-19 | End: 2025-03-19

## 2025-03-19 RX ADMIN — IPRATROPIUM BROMIDE AND ALBUTEROL SULFATE 3 ML: .5; 3 SOLUTION RESPIRATORY (INHALATION) at 04:24

## 2025-03-19 RX ADMIN — OXYCODONE 5 MG: 5 TABLET ORAL at 17:37

## 2025-03-19 RX ADMIN — ACETAMINOPHEN 650 MG: 325 TABLET, FILM COATED ORAL at 13:00

## 2025-03-19 RX ADMIN — ESCITALOPRAM 20 MG: 10 TABLET, FILM COATED ORAL at 08:19

## 2025-03-19 RX ADMIN — ACETAMINOPHEN 650 MG: 325 TABLET, FILM COATED ORAL at 20:21

## 2025-03-19 RX ADMIN — ENOXAPARIN SODIUM 40 MG: 100 INJECTION SUBCUTANEOUS at 17:37

## 2025-03-19 RX ADMIN — AMLODIPINE BESYLATE 10 MG: 5 TABLET ORAL at 13:00

## 2025-03-19 RX ADMIN — LEVOTHYROXINE SODIUM 175 MCG: 175 TABLET ORAL at 05:29

## 2025-03-19 RX ADMIN — Medication 5 MG: at 20:21

## 2025-03-19 RX ADMIN — Medication 10 ML: at 20:21

## 2025-03-19 RX ADMIN — LOSARTAN POTASSIUM 25 MG: 25 TABLET, FILM COATED ORAL at 12:04

## 2025-03-19 RX ADMIN — ASPIRIN 81 MG: 81 TABLET, COATED ORAL at 08:19

## 2025-03-19 RX ADMIN — CLOPIDOGREL BISULFATE 75 MG: 75 TABLET ORAL at 08:19

## 2025-03-19 RX ADMIN — OXYCODONE 5 MG: 5 TABLET ORAL at 08:19

## 2025-03-19 RX ADMIN — Medication 10 ML: at 08:20

## 2025-03-19 RX ADMIN — LOSARTAN POTASSIUM 25 MG: 25 TABLET, FILM COATED ORAL at 08:19

## 2025-03-19 NOTE — DISCHARGE PLACEMENT REQUEST
"Lexi Gilbert (74 y.o. Female)       Date of Birth   1950    Social Security Number       Address   [address] [place] [address] [place] [facility]    Home Phone   [phone]    MRN   [id number]       Pentecostalism   Catholic    Marital Status                               Admission Date   3/17/2025    Admission Type   Emergency    Admitting Provider   Regino Ramos MD    Attending Provider   Regino Ramos MD    Department, Room/Bed   [facility], 249/1       Discharge Date       Discharge Disposition       Discharge Destination                                 Attending Provider: Regino Ramos MD    Allergies: No Known Allergies    Isolation: None   Infection: None   Code Status: CPR    Ht: 157.5 cm (62\")   Wt: 110 kg (243 lb 9.7 oz)    Admission Cmt: None   Principal Problem: Accelerated hypertension [I10]                   Active Insurance as of 3/17/2025       Primary Coverage       Payor Plan Insurance Group Employer/Plan Group    AETNA MEDICARE REPLACEMENT AETNA MEDICARE ADVANTAGE [place] [id number]       Payor Plan Address Payor Plan Phone Number Payor Plan Fax Number Effective Dates    [address] [phone]  1/1/2024 - None Entered    [facility] [id number]         Subscriber Name Subscriber Birth Date Member ID       LEXI GILBERT 1950 [id number]               Secondary Coverage       Payor Plan Insurance Group Employer/Plan Group     FOR LIFE  FOR LIFE MC SUP         Payor Plan Address Payor Plan Phone Number Payor Plan Fax Number Effective Dates    [address] [phone]  5/3/2020 - None Entered    [facility] [id number]         Subscriber Name Subscriber Birth Date Member ID       LEXI GILBERT 1950 [id number]                     Emergency Contacts        (Rel.) Home Phone Work Phone Mobile Phone    VladimirIsrael (Son) [phone] -- --    CHENG GILBERT (Son) -- -- [phone]                 History & " "Physical        Linh Adkins PA-C at 25 1306       Attestation signed by Canelo Tracey DO at 25 2018    I have reviewed this documentation and agree.                      [facility] Medicine Services  History & Physical    Patient Name: Lexi Gilbert  : 1950  MRN: [id number]  Primary Care Physician:  Leandra Pettit MD  Date of admission: 3/17/2025  Date and Time of Service: 3/17/2025 at 1000    Subjective      Chief Complaint: left-sided weakness    History of Present Illness: Lexi Gilbert is a 74 y.o. female with a CMH of hypothyroidism, anxiety/depression, hx of stroke ~15 years ago who presented to [facility] on 3/17/2025 with  left-sided weakness.    Patient presented to the emergency department early this morning with complaint of left-sided weakness which began around 4 AM.  She states that she was watching TV when all of a sudden her left arm felt \"funny\", weak and slightly numb.  She also began having slight dizziness.  She stood up to go lay down in her bed but fell onto the bed and slid to the floor.  She states she feels like she fell because she was weak, particularly in her left leg and was off balance.  She could not get herself up. She attempted to call her son who lives with her but did not get an answer therefore she called EMS.  She says that she was not having any trouble using her phone.  She still feels slightly weak in the left side of her body.  She does not have dizziness any longer.  She denies any chest pain, shortness of breath, slurred speech, dysphagia, vision changes, syncope, near syncope, headache, fever, chills, GI symptoms, urinary symptoms, peripheral edema or other acute symptoms at this time.  She states her blood pressure runs SBP 140s to 150s at home and was surprised that her blood pressure was extremely elevated when EMS checked it. She is not on any antihypertensives or aspirin at home.     On arrival to the ED, her blood pressure " was noted to be 220/120, heart rate 83, 96% on room air.  Labs remarkable for troponin 21, repeat 31, glucose 118.  Chest x-ray showed minor linear atelectasis in the left midlung.  CT head showed no acute intracranial abnormality, moderate chronic small vessel ischemic change and chronic lacunar infarcts in the left putamen.  She was given IV Labetalol.  She is being admitted for further management.    Review of Systems   Constitutional:  Negative for chills, diaphoresis, fatigue and fever.   HENT: Negative.     Respiratory: Negative.     Cardiovascular: Negative.    Gastrointestinal: Negative.    Genitourinary: Negative.    Musculoskeletal:  Positive for gait problem.   Skin: Negative.    Neurological:  Positive for dizziness, weakness and numbness. Negative for seizures, syncope, facial asymmetry, speech difficulty, light-headedness and headaches.       Personal History     Past Medical History:   Diagnosis Date    Arthritis     right knee, sched scope    Bronchitis, chronic     CVA (cerebral infarction)     5 years ago    Hypertension     Hypothyroidism     IBS (irritable bowel syndrome)     Stroke 12/2011       Past Surgical History:   Procedure Laterality Date    BELPHAROPTOSIS REPAIR Bilateral 2003    HYSTERECTOMY      KNEE ARTHROSCOPY Right 5/20/2016    Procedure: KNEE ARTHROSCOPY debride of mmt  chondraplasty mfc/ lfc and patella;  Surgeon: Danie Crowell MD;  Location: [facility];  Service:     MOUTH SURGERY         Family History: family history includes Anesthesia problems in her sister; Cancer in her sister; Hypertension in her father. Otherwise pertinent FHx was reviewed and not pertinent to current issue.    Social History:  reports that she has been smoking cigarettes. She has a 40 pack-year smoking history. She has never used smokeless tobacco. She reports that she does not drink alcohol and does not use drugs.    Home Medications:  Prior to Admission Medications       Prescriptions Last Dose  Informant Patient Reported? Taking?    escitalopram (LEXAPRO) 20 MG tablet 3/16/2025  Yes Yes    Take 1 tablet by mouth Daily.    levothyroxine (SYNTHROID, LEVOTHROID) 175 MCG tablet 3/16/2025  No Yes    TAKE ONE TABLET BY MOUTH DAILY              Allergies:  No Known Allergies    Objective      Vitals:   Temp:  [97.4 °F (36.3 °C)-98 °F (36.7 °C)] 97.4 °F (36.3 °C)  Heart Rate:  [57-88] 63  Resp:  [16-22] 22  BP: (127-238)/() 157/73  Body mass index is 44.56 kg/m².  Physical Exam  Constitutional:       Appearance: Normal appearance.   HENT:      Head: Normocephalic and atraumatic.      Mouth/Throat:      Mouth: Mucous membranes are moist.   Eyes:      Extraocular Movements: Extraocular movements intact.   Cardiovascular:      Rate and Rhythm: Normal rate and regular rhythm.   Pulmonary:      Effort: Pulmonary effort is normal.      Breath sounds: Normal breath sounds.   Abdominal:      Palpations: Abdomen is soft.      Tenderness: There is no abdominal tenderness.   Musculoskeletal:         General: Normal range of motion.      Cervical back: Normal range of motion.      Right lower leg: No edema.      Left lower leg: No edema.   Skin:     General: Skin is warm.   Neurological:      General: No focal deficit present.      Mental Status: She is alert and oriented to person, place, and time.         Diagnostic Data:  Lab Results (last 24 hours)       Procedure Component Value Units Date/Time    Basic Metabolic Panel [[id number]]  (Abnormal) Collected: 03/17/25 0615    Specimen: Blood Updated: 03/17/25 0642     Glucose 134 mg/dL      BUN 19 mg/dL      Creatinine 0.70 mg/dL      Sodium 142 mmol/L      Potassium 4.0 mmol/L      Chloride 107 mmol/L      CO2 24.1 mmol/L      Calcium 9.5 mg/dL      BUN/Creatinine Ratio 27.1     Anion Gap 10.9 mmol/L      eGFR 89.8 mL/min/1.73     Narrative:      GFR Categories in Chronic Kidney Disease (CKD)      GFR Category          GFR (mL/min/1.73)    Interpretation  G1                      90 or greater         Normal or high (1)  G2                      60-89                Mild decrease (1)  G3a                   45-59                Mild to moderate decrease  G3b                   30-44                Moderate to severe decrease  G4                    15-29                Severe decrease  G5                    14 or less           Kidney failure          (1)In the absence of evidence of kidney disease, neither GFR category G1 or G2 fulfill the criteria for CKD.    eGFR calculation 2021 CKD-EPI creatinine equation, which does not include race as a factor    High Sensitivity Troponin T 1Hr [[id number]]  (Abnormal) Collected: 03/17/25 0505    Specimen: Blood Updated: 03/17/25 0532     HS Troponin T 31 ng/L      Troponin T Numeric Delta 10 ng/L      Troponin T % Delta 48    Narrative:      High Sensitive Troponin T Reference Range:  <14.0 ng/L- Negative Female for AMI  <22.0 ng/L- Negative Male for AMI  >=14 - Abnormal Female indicating possible myocardial injury.  >=22 - Abnormal Male indicating possible myocardial injury.   Clinicians would have to utilize clinical acumen, EKG, Troponin, and serial changes to determine if it is an Acute Myocardial Infarction or myocardial injury due to an underlying chronic condition.         Comprehensive Metabolic Panel [[id number]]  (Abnormal) Collected: 03/17/25 0354    Specimen: Blood Updated: 03/17/25 0427     Glucose 118 mg/dL      BUN 19 mg/dL      Creatinine 0.67 mg/dL      Sodium 142 mmol/L      Potassium 4.3 mmol/L      Comment: Slight hemolysis detected by analyzer. Result may be falsely elevated.        Chloride 107 mmol/L      CO2 23.5 mmol/L      Calcium 9.8 mg/dL      Total Protein 6.7 g/dL      Albumin 4.1 g/dL      ALT (SGPT) 11 U/L      AST (SGOT) 19 U/L      Alkaline Phosphatase 81 U/L      Total Bilirubin 0.3 mg/dL      Globulin 2.6 gm/dL      A/G Ratio 1.6 g/dL      BUN/Creatinine Ratio 28.4     Anion Gap 11.5 mmol/L      eGFR 90.7  mL/min/1.73     Narrative:      GFR Categories in Chronic Kidney Disease (CKD)      GFR Category          GFR (mL/min/1.73)    Interpretation  G1                     90 or greater         Normal or high (1)  G2                      60-89                Mild decrease (1)  G3a                   45-59                Mild to moderate decrease  G3b                   30-44                Moderate to severe decrease  G4                    15-29                Severe decrease  G5                    14 or less           Kidney failure          (1)In the absence of evidence of kidney disease, neither GFR category G1 or G2 fulfill the criteria for CKD.    eGFR calculation 2021 CKD-EPI creatinine equation, which does not include race as a factor    High Sensitivity Troponin T [[id number]]  (Abnormal) Collected: 03/17/25 0354    Specimen: Blood Updated: 03/17/25 0424     HS Troponin T 21 ng/L     Narrative:      High Sensitive Troponin T Reference Range:  <14.0 ng/L- Negative Female for AMI  <22.0 ng/L- Negative Male for AMI  >=14 - Abnormal Female indicating possible myocardial injury.  >=22 - Abnormal Male indicating possible myocardial injury.   Clinicians would have to utilize clinical acumen, EKG, Troponin, and serial changes to determine if it is an Acute Myocardial Infarction or myocardial injury due to an underlying chronic condition.         Extra Tubes [[id number]] Collected: 03/17/25 0354    Specimen: Blood, Venous Line Updated: 03/17/25 0565    Narrative:      The following orders were created for panel order Extra Tubes.  Procedure                               Abnormality         Status                     ---------                               -----------         ------                     Gold Top - [facility] [[email or web address]][[id number]]                                   Final result               Light Blue Top[[id number]]                                   Final result                 Please view results for these tests on the individual  orders.    Gold Top - SST [[id number]] Collected: 03/17/25 0354    Specimen: Blood Updated: 03/17/25 0415     Extra Tube Hold for add-ons.     Comment: Auto resulted.       Light Blue Top [[id number]] Collected: 03/17/25 0354    Specimen: Blood Updated: 03/17/25 0415     Extra Tube Hold for add-ons.     Comment: Auto resulted       CBC & Differential [[id number]]  (Abnormal) Collected: 03/17/25 0354    Specimen: Blood Updated: 03/17/25 0402    Narrative:      The following orders were created for panel order CBC & Differential.  Procedure                               Abnormality         Status                     ---------                               -----------         ------                     CBC Auto Differential[[id number]]        Abnormal            Final result                 Please view results for these tests on the individual orders.    CBC Auto Differential [[id number]]  (Abnormal) Collected: 03/17/25 0354    Specimen: Blood Updated: 03/17/25 0402     WBC 8.83 10*3/mm3      RBC 5.13 10*6/mm3      Hemoglobin 15.0 g/dL      Hematocrit 47.7 %      MCV 93.0 fL      MCH 29.2 pg      MCHC 31.4 g/dL      RDW 13.2 %      RDW-SD 45.5 fl      MPV 10.2 fL      Platelets 253 10*3/mm3      Neutrophil % 76.7 %      Lymphocyte % 15.5 %      Monocyte % 5.5 %      Eosinophil % 1.8 %      Basophil % 0.3 %      Immature Grans % 0.2 %      Neutrophils, Absolute 6.76 10*3/mm3      Lymphocytes, Absolute 1.37 10*3/mm3      Monocytes, Absolute 0.49 10*3/mm3      Eosinophils, Absolute 0.16 10*3/mm3      Basophils, Absolute 0.03 10*3/mm3      Immature Grans, Absolute 0.02 10*3/mm3      nRBC 0.0 /100 WBC     POC Glucose Once [[id number]]  (Abnormal) Collected: 03/17/25 0345    Specimen: Blood Updated: 03/17/25 0347     Glucose 128 mg/dL      Comment: Serial Number: [id number]:  [id number]                Imaging Results (Last 24 Hours)       Procedure Component Value Units Date/Time    CT Angiogram Carotids [[id number]]  Resulted: 03/17/25 1257     Updated: 03/17/25 1257    CT Angiogram Head [[id number]] Resulted: 03/17/25 1257     Updated: 03/17/25 1257    CT Head Without Contrast [[id number]] Collected: 03/17/25 0511     Updated: 03/17/25 0514    Narrative:      CT HEAD WO CONTRAST    Date of Exam: 3/17/2025 4:47 AM EDT    Indication: Elevated blood pressure headache numbness left arm near syncope.    Comparison: None available.    Technique: Axial CT images were obtained of the head without contrast administration.  Coronal reconstructions were performed.  Automated exposure control and iterative reconstruction methods were used.      Findings:  There is a chronic lacunar infarct in the posterior left putamen. There is moderate patchy white matter hypoattenuation. No acute intracranial hemorrhage. Cortical gray-white differentiation is normal. Mild intracranial atherosclerotic calcification.   Small mastoid effusions. There is a sphenoid sinus mucus retention cyst. Orbits are symmetric. No mass effect, midline shift or abnormal extra-axial collection.      Impression:      Impression:  1.No acute intracranial abnormality.  2.Moderate chronic small vessel ischemic change and chronic lacunar infarct in the left putamen.  3.Small mastoid effusions.          Electronically Signed: Femi Infante MD    3/17/2025 5:12 AM EDT    Workstation ID: [id number]    XR Chest 1 View [[id number]] Collected: 03/17/25 0418     Updated: 03/17/25 0421    Narrative:      XR CHEST 1 VW    Date of Exam: 3/17/2025 4:03 AM EDT    Indication: General weakness    Comparison: 5/19/2016.    Findings:  There is minor linear atelectasis in the periphery of the left midlung. There is no pneumothorax, pleural effusion or focal airspace consolidation. Heart size and pulmonary vasculature appear within normal limits. Regional bones appear intact.      Impression:      Impression:  Minor linear atelectasis in the left midlung.          Electronically Signed: Femi  MD Naresh    3/17/2025 4:19 AM EDT    Workstation ID: [id number]              Assessment & Plan        This is a 74 y.o. female with:    Active and Resolved Problems  Active Hospital Problems    Diagnosis  POA    **Accelerated hypertension [I10]  Yes      Resolved Hospital Problems   No resolved problems to display.       Stroke-like symptoms  -Patient presented with left arm and left leg weakness since around 4am. She has a hx of stroke and states her symptoms are similar to her prior stroke. tPA was not given on arrival.   -Reviewed CTH - chronic small vessel ischemic disease and chronic lacunar infarct. No acute findings.   -Will obtain stat CTA head and carotids  -MRI ordered and pending at this time  -NIHSS BID and neuro-checks Q4H  -Start aspirin  -OT/PT consult  -ECHO ordered and pending  -Telemetry  -Allow permissive hypertension for now pending MRI  -Consult neurology -- appreciate further recommendations    Hypertensive emergency  -Initial blood pressure 220/120.  She was given one dose of IV Labetalol.  Blood pressure trending down, currently 151/79.  Will hold additional antihypertensive agents for now to allow for permissive hypertension.  -Troponin trending up 21->31, likely secondary to severe hypertension. She is without chest pain.  EKG reveals LBBB which is chronic, has been present since at least 2016.    Anxiety/depression  -Continue lexapro    Hypothyroidism  -Continue levothyroxine. Check TSH/FT4.    VTE Prophylaxis:  Mechanical VTE prophylaxis orders are present.        The patient desires to be as follows:    CODE STATUS:    Code Status (Patient has no pulse and is not breathing): CPR (Attempt to Resuscitate)  Medical Interventions (Patient has pulse or is breathing): Full Support        Israel Gilbert, who can be contacted at [phone], is the designated person to make medical decisions on the patient's behalf if She is incapable of doing so. This was clarified with patient and/or next of kin  on 3/17/2025 during the course of this H&P.    Admission Status:  I believe this patient meets obs status.    Expected Length of Stay: 1 day    PDMP and Medication Dispenses via Sidebar reviewed and consistent with patient reported medications.    I discussed the patient's findings and my recommendations with patient and nursing staff.      Signature:     This document has been electronically signed by Linh Adkins PA-C on 2025 13:06 EDT   [place] Hospitalist Team    Electronically signed by Canelo Tracey DO at 25 2018       Operative/Procedure Notes (all)    No notes of this type exist for this encounter.          Physician Progress Notes (last 48 hours)        Regino Ramos MD at 25 1352              [facility] MEDICINE SERVICE  DAILY PROGRESS NOTE    NAME: Lexi Gilbert  : 1950  MRN: [id number]      LOS: 0 days     PROVIDER OF SERVICE: Regino Ramos MD    Chief Complaint: Accelerated hypertension    Subjective:     Interval History:  History taken from: Patient and patient's chart     C/o left sided weakness        Review of Systems:   Review of Systems  All negative except above   Objective:     Vital Signs  Temp:  [97.8 °F (36.6 °C)-98.1 °F (36.7 °C)] 98.1 °F (36.7 °C)  Heart Rate:  [58-69] 58  Resp:  [12-28] 28  BP: (146-226)/(61-96) 152/61  Flow (L/min) (Oxygen Therapy):  [3] 3   Body mass index is 44.56 kg/m².    Physical Exam  Physical Exam  General: Alert and oriented, no acute distress.  Lungs: Clear to auscultation, nonlabored respiration.  Heart: RRR, no murmur, gallop or edema.  Abdomen: Soft, nontender, nondistended, + bowel sounds.  Neuro: alert and awake, left sided weakness        Diagnostic Data    Results from last 7 days   Lab Units 25  2250 25  0615 25  0354   WBC 10*3/mm3 9.59  --  8.83   HEMOGLOBIN g/dL 14.0  --  15.0   HEMATOCRIT % 44.7  --  47.7*   PLATELETS 10*3/mm3 245  --  253   GLUCOSE mg/dL 109*    < > 118*   CREATININE mg/dL 0.64   < > 0.67   BUN mg/dL 15   < > 19   SODIUM mmol/L 141   < > 142   POTASSIUM mmol/L 4.1   < > 4.3   AST (SGOT) U/L  --   --  19   ALT (SGPT) U/L  --   --  11   ALK PHOS U/L  --   --  81   BILIRUBIN mg/dL  --   --  0.3   ANION GAP mmol/L 9.7   < > 11.5    < > = values in this interval not displayed.       Adult Transthoracic Echo Complete W/ Cont if Necessary Per Protocol  Addendum Date: 3/17/2025    Left ventricular systolic function is normal. Calculated left ventricular EF = 60% Left ventricular ejection fraction appears to be 56 - 60%.   Left ventricular diastolic function is consistent with (grade I) impaired relaxation.  Average GLS -13.5%.   Estimated right ventricular systolic pressure from tricuspid regurgitation is normal (<35 mmHg).   There is a small (<1cm) pericardial effusion. There is no evidence of cardiac tamponade.   No significant valvular abnormalities noted.     MRI Brain Without Contrast  Result Date: 3/17/2025  Impression: 1. Small punctate area of acute ischemia at junction of lateral right thalamus and posterior limb of right internal capsule. 2. Extensive white matter findings most compatible with moderate to severe chronic microvascular disease. Electronically Signed: Mejia Adams MD  3/17/2025 4:08 PM EDT  Workstation ID: [id number]    CT Angiogram Carotids  Result Date: 3/17/2025  1.No large vessel occlusive thrombus, flow-limiting stenosis or aneurysm is seen intracranially. 2.Focal high-grade stenosis is suggested within the distal right vertebral artery at the level of the skull base just proximal to the basilar artery. 3.Mild to moderate short segment stenosis is suggested within a right M2 insular branch. Electronically Signed: Naty Haynes MD  3/17/2025 1:55 PM EDT  Workstation ID: [id number]    CT Angiogram Head  Result Date: 3/17/2025  1.No large vessel occlusive thrombus, flow-limiting stenosis or aneurysm is seen intracranially. 2.Focal high-grade  stenosis is suggested within the distal right vertebral artery at the level of the skull base just proximal to the basilar artery. 3.Mild to moderate short segment stenosis is suggested within a right M2 insular branch. Electronically Signed: Naty Haynes MD  3/17/2025 1:55 PM EDT  Workstation ID: [id number]    CT Head Without Contrast  Result Date: 3/17/2025  Impression: 1.No acute intracranial abnormality. 2.Moderate chronic small vessel ischemic change and chronic lacunar infarct in the left putamen. 3.Small mastoid effusions. Electronically Signed: Femi Infante MD  3/17/2025 5:12 AM EDT  Workstation ID: [id number]    XR Chest 1 View  Result Date: 3/17/2025  Impression: Minor linear atelectasis in the left midlung. Electronically Signed: Femi Infante MD  3/17/2025 4:19 AM EDT  Workstation ID: [id number]        I have reviewed patient labs and imaging     Assessment/Plan:     Active and Resolved Problems  # chronic lacunar infarct in the posterior left putamen   # Acute infarct within the lateral right thalamus likely 2/2 HTN  -Patient presented with left arm and left leg weakness since around 4am. She has a hx of stroke and states her symptoms are similar to her prior stroke. tPA was not given on arrival.   -Reviewed CTH - chronic small vessel ischemic disease and chronic lacunar infarct. No acute findings.   -Will obtain stat CTA head and carotids  -MRI ordered and pending at this time  -NIHSS BID and neuro-checks Q4H  -Start aspirin  -OT/PT consult  -ECHO ordered and pending  -Telemetry  -Allow permissive hypertension for now pending MRI  -Consult neurology -- appreciate further recommendations     # Hypertensive emergency  -Initial blood pressure 220/120.  She was given one dose of IV Labetalol.  Blood pressure trending down, currently 151/79.  Will hold additional antihypertensive agents for now to allow for permissive hypertension.  -Troponin trending up 21->31, likely secondary to severe hypertension. She  is without chest pain.  EKG reveals LBBB which is chronic, has been present since at least 2016.  -denies any chest pain  -troponin now trending down   -start Tab Amlodipine 10 mg and Losartan 25 mg  -monitor BP and adjust medications as appropriate       # Anxiety/depression  -Continue lexapro     # Hypothyroidism  -Continue levothyroxine.       VTE Prophylaxis:  Mechanical VTE prophylaxis orders are present.             Disposition Planning:     Barriers to Discharge:medical clearance   Anticipated Date of Discharge: 03/20  Place of Discharge: likely rehab       Time: 40 minutes     Code Status and Medical Interventions: CPR (Attempt to Resuscitate); Full Support   Ordered at: 03/17/25 0559     Code Status (Patient has no pulse and is not breathing):    CPR (Attempt to Resuscitate)     Medical Interventions (Patient has pulse or is breathing):    Full Support       Signature: Electronically signed by Regino Ramos MD, 03/18/25, 13:52 EDT.  [facility] Hospitalist Team    Electronically signed by Regino Ramos MD at 03/18/25 1356       Mary Alice Nguyễn MD at 03/18/25 1143               LOS: 0 days     Chief Complaint:  left sided weakness     Subjective   SUBJECTIVE:    History taken from: patient chart RN    Interval History: Lexi Gilbert was admitted on 3/17/2025 .    No acute events overnight.  Echocardiogram showing small pericardial effusion less than 1 cm.  Patient is doing well clinically.        - Portions of the above HPI were copied from previous encounters and edited as appropriate.    Patient Complaints: low back pain      Review of Systems   Constitutional:  Negative for fatigue and fever.   HENT:  Negative for ear discharge, ear pain, tinnitus, trouble swallowing and voice change.    Eyes:  Negative for photophobia, pain and visual disturbance.   Respiratory:  Negative for chest tightness and shortness of breath.    Cardiovascular:  Negative for chest pain.   Gastrointestinal:   Negative for nausea and vomiting.   Musculoskeletal:  Positive for back pain. Negative for neck pain and neck stiffness.   Neurological:  Positive for weakness. Negative for dizziness, tremors, seizures, syncope, facial asymmetry, speech difficulty, light-headedness, numbness and headaches.   All other systems reviewed and are negative.         Pertinent PMH:  has a past medical history of Arthritis, Bronchitis, chronic, CVA (cerebral infarction), Hypertension, Hypothyroidism, IBS (irritable bowel syndrome), and Stroke (12/2011).   ________________________________________________  Objective   OBJECTIVE:    GEN: NAD, pleasant, cooperative  CHEST: No signs of resp distress, on room air     NEURO  MENTAL STATUS: AAOx3, memory intact, fund of knowledge appropriate  LANG/SPEECH: Naming and repetition intact, fluent, follows 3-step commands     CRANIAL NERVES:  II-XII grossly intact     MOTOR:  Motor strength 5/5 throughout, symmetric.      REFLEXES: 2/4 throughout     SENSORY:  Normal to touch, temp all limbs  No hemineglect, no extinction to double-sided stimulation (visual & tactile)  COORD: Normal finger to nose           NIH Stroke Scale  1a. Level of Consciousness: 0-->Alert, keenly responsive  1b. LOC Questions: 0-->Answers both questions correctly  1c. LOC Commands: 0-->Performs both tasks correctly  2. Best Gaze: 0-->Normal  3. Visual: 0-->No visual loss  4. Facial Palsy: 1-->Minor paralysis (flattened nasolabial fold, asymmetry on smiling)  5a. Motor Arm, Left: 1-->Drift, limb holds 90 (or 45) degrees, but drifts down before full 10 seconds, does not hit bed or other support  5b. Motor Arm, Right: 0-->No drift, limb holds 90 (or 45) degrees for full 10 secs  6a. Motor Leg, Left: 0-->No drift, leg holds 30 degree position for full 5 secs  6b. Motor Leg, Right: 0-->No drift, leg holds 30 degree position for full 5 secs  7. Limb Ataxia: 0-->Absent  8. Sensory: 0-->Normal, no sensory loss  9. Best Language: 0-->No  aphasia, normal  10. Dysarthria: 0-->Normal  11. Extinction and Inattention (formerly Neglect): 0-->No abnormality  Total (NIH Stroke Scale): 2         ________________________________________________   RESULTS REVIEW    VITAL SIGNS:  Temp:  [97.5 °F (36.4 °C)-98.1 °F (36.7 °C)] 98.1 °F (36.7 °C)  Heart Rate:  [58-69] 58  Resp:  [12-28] 20  BP: (146-226)/() 226/96    LABS:       Lab 03/17/25  2250 03/17/25  0354   WBC 9.59 8.83   HEMOGLOBIN 14.0 15.0   HEMATOCRIT 44.7 47.7*   PLATELETS 245 253   NEUTROS ABS  --  6.76   IMMATURE GRANS (ABS)  --  0.02   LYMPHS ABS  --  1.37   MONOS ABS  --  0.49   EOS ABS  --  0.16   MCV 92.2 93.0         Lab 03/17/25  2250 03/17/25  1313 03/17/25  0615 03/17/25  0354   SODIUM 141  --  142 142   POTASSIUM 4.1  --  4.0 4.3   CHLORIDE 107  --  107 107   CO2 24.3  --  24.1 23.5   ANION GAP 9.7  --  10.9 11.5   BUN 15  --  19 19   CREATININE 0.64  --  0.70 0.67   EGFR 91.7  --  89.8 90.7   GLUCOSE 109*  --  134* 118*   CALCIUM 9.6  --  9.5 9.8   TSH  --  1.650  --   --          Lab 03/17/25  0354   TOTAL PROTEIN 6.7   ALBUMIN 4.1   GLOBULIN 2.6   ALT (SGPT) 11   AST (SGOT) 19   BILIRUBIN 0.3   ALK PHOS 81         Lab 03/17/25  1313 03/17/25  0505 03/17/25  0354   HSTROP T 31* 31* 21*                     Lab Results   Component Value Date    TSH 1.650 03/17/2025     (H) 07/27/2020    [id number] 433 07/27/2020         IMAGING STUDIES:  Adult Transthoracic Echo Complete W/ Cont if Necessary Per Protocol  Addendum Date: 3/17/2025    Left ventricular systolic function is normal. Calculated left ventricular EF = 60% Left ventricular ejection fraction appears to be 56 - 60%.   Left ventricular diastolic function is consistent with (grade I) impaired relaxation.  Average GLS -13.5%.   Estimated right ventricular systolic pressure from tricuspid regurgitation is normal (<35 mmHg).   There is a small (<1cm) pericardial effusion. There is no evidence of cardiac tamponade.   No  significant valvular abnormalities noted.     MRI Brain Without Contrast  Result Date: 3/17/2025  Impression: 1. Small punctate area of acute ischemia at junction of lateral right thalamus and posterior limb of right internal capsule. 2. Extensive white matter findings most compatible with moderate to severe chronic microvascular disease. Electronically Signed: Mejia Adams MD  3/17/2025 4:08 PM EDT  Workstation ID: [id number]    CT Angiogram Carotids  Result Date: 3/17/2025  1.No large vessel occlusive thrombus, flow-limiting stenosis or aneurysm is seen intracranially. 2.Focal high-grade stenosis is suggested within the distal right vertebral artery at the level of the skull base just proximal to the basilar artery. 3.Mild to moderate short segment stenosis is suggested within a right M2 insular branch. Electronically Signed: Naty Haynes MD  3/17/2025 1:55 PM EDT  Workstation ID: [id number]    CT Angiogram Head  Result Date: 3/17/2025  1.No large vessel occlusive thrombus, flow-limiting stenosis or aneurysm is seen intracranially. 2.Focal high-grade stenosis is suggested within the distal right vertebral artery at the level of the skull base just proximal to the basilar artery. 3.Mild to moderate short segment stenosis is suggested within a right M2 insular branch. Electronically Signed: Naty Haynes MD  3/17/2025 1:55 PM EDT  Workstation ID: [id number]    CT Head Without Contrast  Result Date: 3/17/2025  Impression: 1.No acute intracranial abnormality. 2.Moderate chronic small vessel ischemic change and chronic lacunar infarct in the left putamen. 3.Small mastoid effusions. Electronically Signed: Femi Infante MD  3/17/2025 5:12 AM EDT  Workstation ID: [id number]    XR Chest 1 View  Result Date: 3/17/2025  Impression: Minor linear atelectasis in the left midlung. Electronically Signed: Femi Infante MD  3/17/2025 4:19 AM EDT  Workstation ID: [id number]      I reviewed the patient's new clinical  results.    ________________________________________________      PROBLEM LIST:    Accelerated hypertension        ASSESSMENT/PLAN:  Acute left sided weakness  chronic lacunar infarct in the posterior left putamen   Acute infarct within the lateral right thalamus likely 2/2 HTN     NIHSS 2     Imaging reviewed  NCCT: negative  CTA H/N: no LVO. Focal high-grade stenosis is suggested within the distal right vertebral artery at the level of the skull base just proximal to the basilar artery.   Mild to moderate short segment stenosis is suggested within a right M2 insular branch.   MRI Brain WO: Small punctate area of acute ischemia at junction of lateral right thalamus and posterior limb of right internal capsule.   Baseline EKG and chest x-ray  Echocardiogram showing a small (<1cm) pericardial effusion. LVEF 56-60%.  Continuous cardiac telemetry to monitor for arrhythmia  Plavix load 300 mg once PO +  mg once PO/MT, then ASA 81 mg daily PO and Plavix 75 mg daily PO x21 days then transition to ASA 81 mg daily PO monotherapy  Permissive hypertension for 24 hours.  Labetalol 10 mg IV, may repeat x1. PRN BP >220/120 (non-tenecteplase candidate)  Stroke labwork: HgbA1C, lipid panel, B12, TSH, urine drug screen  High intensity statin therapy for LDL >70 (to be started prior to discharge)  PT/OT/Speech/swallow consults as indicated   Please document NIHSS on admission and with any neurochanges  Strict NPO until patient passes RN stroke swallow screen or speech therapy evaluation  Oxygen therapy (titrate to keep SpO2 greater than 94%  Activity as tolerated  Stroke education provided by nursing per institutional guidelines   Outpatient follow-up in the stroke clinic.  Referral sent.  [facility]OT on d/c      I will sign off at this time but am happy to re-evaluate should any new neurological issues arise or should further input be deemed helpful.    Medication benefits and potential side effects were discussed with the patient  and they verbalized their understanding.     Patient was instructed to immediately call 911 or return to the closest ER with any any new weakness, numbness, speech difficulty or vision disturbance.         **Please refer to previous notes for further details and recommendations.     I discussed the patient's findings and my recommendations with patient, nursing staff, and primary care team    Mary Alice Nguyễn MD  03/18/25  11:44 EDT          Electronically signed by Mary Alice Nguyễn MD at 03/18/25 1928          Consult Notes (last 48 hours)        Mary Alice Nguyễn MD at 03/17/25 1618        Consult Orders    1. Inpatient Neurology Consult Stroke [[id number]] ordered by Linh Adkins PA-C at 03/17/25 1134                 Primary Care Provider: Leandra Pettti MD     Consult requested by:  hospitalist service     Reason for Consultation: Neurological evaluation - Left sided weakness    History taken from: patient chart RN    Chief complaint: left sided weakness    Subjective   SUBJECTIVE:    History of present illness: Background per H&P: Lexi Gilbert is a 74 y.o. female who has a PMHx of hypothyroidism, anxiety/depression, hx of stroke ~15 years ago without residual sx who presented to [facility] on 3/17/2025 with left-sided weakness.     Patient presented to this facility earlier this morning with mental left-sided weakness starting around 2 AM.  Point was watching TV and suddenly felt her left arm feeling strange, weak and slightly numb.  Around the same time she began experiencing slight dizziness went to stand up and go lay down in her bed but fell onto the bed and slid to the floor.  She reportedly fell because she felt weak in her legs and felt off balance.  She could not get herself up and called EMS.  By time she arrived to the ED, her dizziness had improved and had some residual weakness on the left side of her body.    On arrival to the ED, her blood pressure was noted to be 220/120 troponin of 21  and glucose 118.  CT head was unremarkable.  CTA showed no large vessel occlusion, flow-limiting stenosis or aneurysm intracranially.  There was focal high-grade stenosis in the distal right vertebral artery at the level of the skull base just proximal to the basilar artery.  Mild to moderate short segment stenosis in the right M2 insular branch.  She received 1 dose of IV labetalol and was admitted for further management.      NIHSS 3  LKW 0200 3/17/25  No A/C or A/P use      - Portions of the above HPI were copied from previous encounters and edited as appropriate. PMH as detailed below.     Review of Systems   Constitutional:  Negative for fatigue and fever.   HENT:  Negative for ear discharge, ear pain, tinnitus, trouble swallowing and voice change.    Eyes:  Negative for photophobia, pain and visual disturbance.   Respiratory:  Negative for chest tightness and shortness of breath.    Cardiovascular:  Negative for chest pain.   Gastrointestinal:  Negative for nausea and vomiting.   Musculoskeletal:  Negative for back pain, neck pain and neck stiffness.   Neurological:  Positive for weakness and numbness. Negative for dizziness, tremors, seizures, syncope, facial asymmetry, speech difficulty, light-headedness and headaches.   All other systems reviewed and are negative.         PATIENT HISTORY:  Past Medical History:   Diagnosis Date    Arthritis     right knee, sched scope    Bronchitis, chronic     CVA (cerebral infarction)     5 years ago    Hypertension     Hypothyroidism     IBS (irritable bowel syndrome)     Stroke 12/2011   ,   Past Surgical History:   Procedure Laterality Date    BELPHAROPTOSIS REPAIR Bilateral 2003    HYSTERECTOMY      KNEE ARTHROSCOPY Right 5/20/2016    Procedure: KNEE ARTHROSCOPY debride of mmt  chondraplasty mfc/ lfc and patella;  Surgeon: Danie Crowell MD;  Location: [facility];  Service:     MOUTH SURGERY     ,   Family History   Problem Relation Age of Onset    Hypertension Father      Cancer Sister     Anesthesia problems Sister    ,   Social History     Tobacco Use    Smoking status: Every Day     Current packs/day: 1.00     Average packs/day: 1 pack/day for 40.0 years (40.0 ttl pk-yrs)     Types: Cigarettes    Smokeless tobacco: Never   Vaping Use    Vaping status: Never Used   Substance Use Topics    Alcohol use: No    Drug use: No   ,   Prior to Admission medications    Medication Sig Start Date End Date Taking? Authorizing Provider   escitalopram (LEXAPRO) 20 MG tablet Take 1 tablet by mouth Daily.   Yes ProviderKarlene MD   levothyroxine (SYNTHROID, LEVOTHROID) 175 MCG tablet TAKE ONE TABLET BY MOUTH DAILY 6/27/16  Yes Leandra Pettit MD   escitalopram (LEXAPRO) 10 MG tablet TAKE ONE TABLET BY MOUTH DAILY 4/25/16 3/17/25 Yes Leandra Pettit MD    Allergies:  Patient has no known allergies.    Current Facility-Administered Medications   Medication Dose Route Frequency Provider Last Rate Last Admin    acetaminophen (TYLENOL) tablet 650 mg  650 mg Oral Q4H PRN Renata Eckert APRN        Or    acetaminophen (TYLENOL) 160 MG/5ML oral solution 650 mg  650 mg Oral Q4H PRN Renata Eckert APRN        Or    acetaminophen (TYLENOL) suppository 650 mg  650 mg Rectal Q4H PRN Renata Eckert APRN        aspirin EC tablet 81 mg  81 mg Oral Daily Linh Adkins PA-C   81 mg at 03/17/25 1655    sennosides-docusate (PERICOLACE) 8.6-50 MG per tablet 2 tablet  2 tablet Oral BID PRN Renata Eckert APRN        And    polyethylene glycol (MIRALAX) packet 17 g  17 g Oral Daily PRN Renata Eckert APRN        And    bisacodyl (DULCOLAX) EC tablet 5 mg  5 mg Oral Daily PRN Renata Eckert APRN        And    bisacodyl (DULCOLAX) suppository 10 mg  10 mg Rectal Daily PRN Renata Eckert APRN        escitalopram (LEXAPRO) tablet 20 mg  20 mg Oral Daily Linh Adkins PA-C   20 mg at 03/17/25 1227    labetalol (NORMODYNE,TRANDATE) injection 10 mg  10 mg Intravenous Q4H PRN Linh Adkins PA-C         levothyroxine (SYNTHROID, LEVOTHROID) tablet 175 mcg  175 mcg Oral Q AM Linh Adkins PA-C   175 mcg at 03/17/25 1227    melatonin tablet 5 mg  5 mg Oral Nightly PRN Renata Eckert APRN        nitroglycerin (NITROSTAT) SL tablet 0.4 mg  0.4 mg Sublingual Q5 Min PRN Renata Eckert APRN        ondansetron (ZOFRAN) injection 4 mg  4 mg Intravenous Q6H PRN Renata Eckert, CHAYO        sodium chloride 0.9 % flush 10 mL  10 mL Intravenous PRN Pratik Young MD        sodium chloride 0.9 % flush 10 mL  10 mL Intravenous Q12H Renata Eckert APRN   10 mL at 03/17/25 0808    sodium chloride 0.9 % flush 10 mL  10 mL Intravenous PRN Renata Eckert APRN        sodium chloride 0.9 % infusion 40 mL  40 mL Intravenous PRN Renata Eckert APRN            ________________________________________________________     Objective   OBJECTIVE:  Upon today's exam,     GEN: NAD, pleasant, cooperative  CHEST: No signs of resp distress, on room air    NEURO  MENTAL STATUS: AAOx3, memory intact, fund of knowledge appropriate  LANG/SPEECH: Naming and repetition intact, fluent, follows 3-step commands    CRANIAL NERVES:  II-XII grossly intact    MOTOR:  Motor strength 5/5 throughout, symmetric.     REFLEXES: 2/4 throughout    SENSORY:  Normal to touch, temp all limbs  No hemineglect, no extinction to double-sided stimulation (visual & tactile)  COORD: Normal finger to nose          NIH Stroke Scale  1a. Level of Consciousness: 0-->Alert, keenly responsive  1b. LOC Questions: 0-->Answers both questions correctly  1c. LOC Commands: 0-->Performs both tasks correctly  2. Best Gaze: 0-->Normal  3. Visual: 0-->No visual loss  4. Facial Palsy: 1-->Minor paralysis (flattened nasolabial fold, asymmetry on smiling)  5a. Motor Arm, Left: 1-->Drift, limb holds 90 (or 45) degrees, but drifts down before full 10 seconds, does not hit bed or other support  5b. Motor Arm, Right: 0-->No drift, limb holds 90 (or 45) degrees for full 10 secs  6a.  Motor Leg, Left: 0-->No drift, leg holds 30 degree position for full 5 secs  6b. Motor Leg, Right: 0-->No drift, leg holds 30 degree position for full 5 secs  7. Limb Ataxia: 0-->Absent  8. Sensory: 0-->Normal, no sensory loss  9. Best Language: 0-->No aphasia, normal  10. Dysarthria: 1-->Mild-to-moderate dysarthria, patient slurs at least some words and, at worst, can be understood with some difficulty  11. Extinction and Inattention (formerly Neglect): 0-->No abnormality  Total (NIH Stroke Scale): 3         ________________________________________________________   RESULTS REVIEW:    VITAL SIGNS:   Temp:  [97.4 °F (36.3 °C)-98 °F (36.7 °C)] 97.8 °F (36.6 °C)  Heart Rate:  [57-88] 69  Resp:  [14-22] 19  BP: (127-238)/() 161/72     LABS:      Lab 03/17/25  0354   WBC 8.83   HEMOGLOBIN 15.0   HEMATOCRIT 47.7*   PLATELETS 253   NEUTROS ABS 6.76   IMMATURE GRANS (ABS) 0.02   LYMPHS ABS 1.37   MONOS ABS 0.49   EOS ABS 0.16   MCV 93.0         Lab 03/17/25  1313 03/17/25  0615 03/17/25  0354   SODIUM  --  142 142   POTASSIUM  --  4.0 4.3   CHLORIDE  --  107 107   CO2  --  24.1 23.5   ANION GAP  --  10.9 11.5   BUN  --  19 19   CREATININE  --  0.70 0.67   EGFR  --  89.8 90.7   GLUCOSE  --  134* 118*   CALCIUM  --  9.5 9.8   TSH 1.650  --   --          Lab 03/17/25  0354   TOTAL PROTEIN 6.7   ALBUMIN 4.1   GLOBULIN 2.6   ALT (SGPT) 11   AST (SGOT) 19   BILIRUBIN 0.3   ALK PHOS 81         Lab 03/17/25  1313 03/17/25  0505 03/17/25  0354   HSTROP T 31* 31* 21*                     Lab Results   Component Value Date    TSH 1.650 03/17/2025     (H) 07/27/2020    [id number] 433 07/27/2020       IMAGING STUDIES:  MRI Brain Without Contrast  Result Date: 3/17/2025  Impression: 1. Small punctate area of acute ischemia at junction of lateral right thalamus and posterior limb of right internal capsule. 2. Extensive white matter findings most compatible with moderate to severe chronic microvascular disease. Electronically  Signed: Mejia Adams MD  3/17/2025 4:08 PM EDT  Workstation ID: [id number]    CT Angiogram Carotids  Result Date: 3/17/2025  1.No large vessel occlusive thrombus, flow-limiting stenosis or aneurysm is seen intracranially. 2.Focal high-grade stenosis is suggested within the distal right vertebral artery at the level of the skull base just proximal to the basilar artery. 3.Mild to moderate short segment stenosis is suggested within a right M2 insular branch. Electronically Signed: Naty Haynes MD  3/17/2025 1:55 PM EDT  Workstation ID: [id number]    CT Angiogram Head  Result Date: 3/17/2025  1.No large vessel occlusive thrombus, flow-limiting stenosis or aneurysm is seen intracranially. 2.Focal high-grade stenosis is suggested within the distal right vertebral artery at the level of the skull base just proximal to the basilar artery. 3.Mild to moderate short segment stenosis is suggested within a right M2 insular branch. Electronically Signed: Naty Haynes MD  3/17/2025 1:55 PM EDT  Workstation ID: [id number]    CT Head Without Contrast  Result Date: 3/17/2025  Impression: 1.No acute intracranial abnormality. 2.Moderate chronic small vessel ischemic change and chronic lacunar infarct in the left putamen. 3.Small mastoid effusions. Electronically Signed: Femi Infante MD  3/17/2025 5:12 AM EDT  Workstation ID: [id number]    XR Chest 1 View  Result Date: 3/17/2025  Impression: Minor linear atelectasis in the left midlung. Electronically Signed: Femi Infante MD  3/17/2025 4:19 AM EDT  Workstation ID: [id number]      I reviewed the patient's new clinical results.    ________________________________________________________     PROBLEM LIST:    Accelerated hypertension            ASSESSMENT/PLAN:    Acute left sided weakness  chronic lacunar infarct in the posterior left putamen   Acute infarct within the lateral right thalamus likely 2/2 HTN    NIHSS 3    Imaging reviewed  NCCT: negative  CTA H/N: no LVO. Focal high-grade  stenosis is suggested within the distal right vertebral artery at the level of the skull base just proximal to the basilar artery.   Mild to moderate short segment stenosis is suggested within a right M2 insular branch.   MRI Brain WO: Small punctate area of acute ischemia at junction of lateral right thalamus and posterior limb of right internal capsule.   Baseline EKG and chest x-ray  Echocardiogram with bubble study  Continuous cardiac telemetry to monitor for arrhythmia  Plavix load 300 mg once PO +  mg once PO/AL, then ASA 81 mg daily PO and Plavix 75 mg daily PO x21 days then transition to ASA 81 mg daily PO monotherapy  Permissive hypertension for 24 hours.  Labetalol 10 mg IV, may repeat x1. PRN BP >220/120 (non-tenecteplase candidate)  Stroke labwork: HgbA1C, lipid panel, B12, TSH, urine drug screen  High intensity statin therapy for LDL >70 (to be started prior to discharge)  PT/OT/Speech/swallow consults as indicated   Please document NIHSS on admission and with any neurochanges  Strict NPO until patient passes RN stroke swallow screen or speech therapy evaluation  Oxygen therapy (titrate to keep SpO2 greater than 94%  Activity as tolerated  Stroke education provided by nursing per institutional guidelines             Modification of stroke risk factors:   - Blood pressure should be less than 130/80 outpatient, HbA1c less than 6.5, LDL less than 70; b12>500 and smoking cessation if applicable. We would be grateful if the primary team / primary care physician would keep a close watch on the above targets.  - Stroke education  - Follow up with neurologist of choice      I discussed the patient's findings and my recommendations with patient, nursing staff, and primary care team    Mary Alice Nguyễn MD  03/17/25  17:27 EDT            Electronically signed by Mary Alice Nguyễn MD at 03/17/25 1920       Nutrition Notes (most recent note)    No notes exist for this encounter.       Speech Language  Pathology Notes (most recent note)    No notes exist for this encounter.     Ai Cedillo, OT   Occupational Therapist  Specialty:  Occupational Therapy  Therapy Evaluation     Signed  Date of Service:  25 1414  Creation Time:  25     Signed        Expand All Collapse All  Patient Name: Lexi Gilbert                      : 1950                      MRN: [id number]                              Today's Date: 3/18/2025                                   Admit Date: 3/17/2025                        Visit Dx:   Visit Diagnosis       ICD-10-CM ICD-9-CM   1. Accelerated hypertension  I10 401.0   2. Weakness  R53.1 780.79   3. Fall, initial encounter  W19.XXXA E888.9         Problem List       Patient Active Problem List   Diagnosis    Depression    Dysfunction of eustachian tube    Gastroesophageal reflux disease    Hyperlipidemia    Hypertension    Hypothyroidism    Menopause present    Vitamin D deficiency    Pre-op evaluation    Osteoarthritis of right knee    Smoker    COPD (chronic obstructive pulmonary disease)    CVA (cerebral vascular accident)    Calculus of ureter    Accelerated hypertension         Medical History        Past Medical History:   Diagnosis Date    Arthritis       right knee, sched scope    Bronchitis, chronic      CVA (cerebral infarction)       5 years ago    Hypertension      Hypothyroidism      IBS (irritable bowel syndrome)      Stroke 2011         Surgical History         Past Surgical History:   Procedure Laterality Date    BELPHAROPTOSIS REPAIR Bilateral     HYSTERECTOMY        KNEE ARTHROSCOPY Right 2016     Procedure: KNEE ARTHROSCOPY debride of mmt  chondraplasty mfc/ lfc and patella;  Surgeon: Danie Crowell MD;  Location: [facility];  Service:     MOUTH SURGERY               General Information         Row Name 25 1342                 OT Time and Intention     Document Type evaluation  -DT       Mode of Treatment occupational therapy   -DT       Patient Effort excellent  -DT          Row Name 03/18/25 1348                 General Information     Patient Profile Reviewed yes  -DT       Prior Level of Function independent:;all household mobility;ADL's;community mobility;driving;home management  Pt does not use any AD or O2. She does own a SPC  -DT       Existing Precautions/Restrictions fall;oxygen therapy device and L/min  Pt has had a fall this encounter.  -DT       Barriers to Rehab none identified  -DT          Row Name 03/18/25 1342                 Living Environment     Current Living Arrangements home  -DT       People in Home child(levi), adult  son & DIL.  -DT          Row Name 03/18/25 1342                 Home Main Entrance     Number of Stairs, Main Entrance one  -DT          Row Name 03/18/25 1342                 Stairs Within Home, Primary     Number of Stairs, Within Home, Primary none  -DT          Row Name 03/18/25 1342                 Cognition     Orientation Status (Cognition) oriented x 4  -DT          Row Name 03/18/25 1344                 Safety Issues/Impairments Affecting Functional Mobility     Impairments Affecting Function (Mobility) balance;endurance/activity tolerance;strength;range of motion (ROM);coordination;grasp;sensation/sensory awareness  -DT                       User Key  (r) = Recorded By, (t) = Taken By, (c) = Cosigned By        Initials Name Provider Type     DT Ai Cedillo, OT Occupational Therapist                                     Mobility/ADL's         Row Name 03/18/25 7900                 Bed Mobility     Bed Mobility bed mobility (all) activities;rolling left;rolling right;supine-sit  -DT       All Activities, [place] (Bed Mobility) minimum assist (75% patient effort)  -DT       Rolling Left [place] (Bed Mobility) minimum assist (75% patient effort)  -DT       Supine-Sit [place] (Bed Mobility) moderate assist (50% patient effort)  -DT          Row Name 03/18/25 2861                  Transfers     Transfers bed-chair transfer;sit-stand transfer;stand-sit transfer  -DT          Row Name 03/18/25 1345                 Bed-Chair Transfer     Bed-Chair [place] (Transfers) maximum assist (25% patient effort);2 person assist  -DT       Assistive Device (Bed-Chair Transfers) walker, front-wheeled  -DT          Row Name 03/18/25 1345                 Sit-Stand Transfer     Sit-Stand [place] (Transfers) maximum assist (25% patient effort)  -DT       Assistive Device (Sit-Stand Transfers) walker, front-wheeled  -DT          Row Name 03/18/25 1345                 Stand-Sit Transfer     Stand-Sit [place] (Transfers) maximum assist (25% patient effort)  -DT       Assistive Device (Stand-Sit Transfers) walker, front-wheeled  -DT          Row Name 03/18/25 1345                 Functional Mobility     Patient was able to Ambulate no, other medical factors prevent ambulation  -DT       Reason Patient was unable to Ambulate Excessive Weakness  -DT          Row Name 03/18/25 1347                 Activities of Daily Living     BADL Assessment/Intervention other (see comments)  Pt requiring assist for bathing, dressing, toileting, ADLs due to dec standing balance, endurance, left side weakness & numbness, dec grasp strength.  -DT                       User Key  (r) = Recorded By, (t) = Taken By, (c) = Cosigned By        Initials Name Provider Type     DT Ai Cedillo, OT Occupational Therapist                            Obj/Interventions         Row Name 03/18/25 1402                 Sensory Assessment (Somatosensory)     Sensory Assessment (Somatosensory) other (see comments)  -DT       Sensory Assessment Pt reports dec sensation/numbness in LUE  -DT          Row Name 03/18/25 1405                 Vision Assessment/Intervention     Visual Impairment/Limitations WFL;corrective lenses full-time  -DT          Row Name 03/18/25 1407                 Range of Motion Comprehensive      General Range of Motion bilateral upper extremity ROM WFL  -DT       Comment, General Range of Motion left shoulder flex/ext (approx 45*; elbow & grasp = min deficits in flex, RUE = grossly 4/5  -DT          Row Name 03/18/25 1405                 Strength Comprehensive (MMT)     General Manual Muscle Testing (MMT) Assessment upper extremity strength deficits identified;lower extremity strength deficits identified  -DT       Comment, General Manual Muscle Testing (MMT) Assessment LUE elbow & grasp = 3-/5.  -DT          Row Name 03/18/25 1405                 Motor Skills     Motor Skills functional endurance  -DT       Functional Endurance fair. Pt c/o inc general fatigue  -DT          Row Name 03/18/25 1405                 Balance     Balance Assessment sitting static balance;sitting dynamic balance;standing static balance;standing dynamic balance  -DT       Static Sitting Balance standby assist  -DT       Dynamic Sitting Balance contact guard  -DT       Position, Sitting Balance unsupported;sitting edge of bed  -DT       Static Standing Balance maximum assist  -DT       Dynamic Standing Balance maximum assist;2-person assist  -DT       Position/Device Used, Standing Balance supported;walker, front-wheeled  -DT                       User Key  (r) = Recorded By, (t) = Taken By, (c) = Cosigned By        Initials Name Provider Type     DT Ai Cedillo, OT Occupational Therapist                            Goals/Plan         Row Name 03/18/25 1412                 Transfer Goal 1 (OT)     Activity/Assistive Device (Transfer Goal 1, OT) transfers, all  -DT       [place] Level/Cues Needed (Transfer Goal 1, OT) moderate assist (50-74% patient effort)  -DT       Time Frame (Transfer Goal 1, OT) long term goal (LTG);2 weeks  -DT          Row Name 03/18/25 1412                 Dressing Goal 1 (OT)     Activity/Device (Dressing Goal 1, OT) lower body dressing  -DT       [place]/Cues Needed (Dressing Goal 1,  OT) minimum assist (75% or more patient effort)  -DT       Time Frame (Dressing Goal 1, OT) long term goal (LTG);2 weeks  -DT          Row Name 03/18/25 1412                 Toileting Goal 1 (OT)     Activity/Device (Toileting Goal 1, OT) toileting skills, all  -DT       [place] Level/Cues Needed (Toileting Goal 1, OT) minimum assist (75% or more patient effort)  -DT       Time Frame (Toileting Goal 1, OT) long term goal (LTG);2 weeks  -DT          Row Name 03/18/25 1412                 Therapy Assessment/Plan (OT)     Planned Therapy Interventions (OT) activity tolerance training;BADL retraining;functional balance retraining;neuromuscular control/coordination retraining;occupation/activity based interventions;patient/caregiver education/training;ROM/therapeutic exercise;strengthening exercise;transfer/mobility retraining  -DT                    User Key  (r) = Recorded By, (t) = Taken By, (c) = Cosigned By        Initials Name Provider Type     DT Ai Cedillo, OT Occupational Therapist                         Clinical Impression         Row Name 03/18/25 1407                 Pain Assessment     Pain Location back;buttock  -DT       Pain Side/Orientation lower  -DT       Additional Documentation Pain Scale: FACES Pre/Post-Treatment (Group)  -DT          Row Name 03/18/25 2197                 Pain Scale: FACES Pre/Post-Treatment     Pain: FACES Scale, Pretreatment 2-->hurts little bit  -DT       Posttreatment Pain Rating 2-->hurts little bit  -DT          Row Name 03/18/25 1406                 Plan of Care Review     Plan of Care Reviewed With patient  -DT       Outcome Evaluation Pt is a 74 y.o. F adm to [facility] with c/o left side weakness, LUE numbness & general weakness causing fall at home. Pt c/o dizziness prior to episode & was also unable to get up on her own. On arrival in ED, her BP was 220/120. MRI: PMH: arthritis and previous stroke  (2010 affecting right side with no long term deficits),  anxiety, depression.  On arrival to the ED, her blood pressure was noted to be 220/120. Pt adm with acute left sided weakness; chronic lacunar infarct in the posterior left putamen; acute infarct within the lateral right thalamus likely 2/2 HTN. At baseline, pt lives with son & DIL in a SSH with 1 GILBERT. She reports she is ind with all BADLs, does all cooking, her own laundry & helps with cleaning dishes. She ambulates independently with an AD, but does own a SPC. Upon eval, pt is A&O X4. She has dec LUE/LLE strength & reports dec sensation in LUE. Pt has hx of chronic left shoulder ROM deficits & demo approx 45 degree AROM flex/abd this date. She requires assistance for bed mobilty (min-mod A), ADL transfers (max A X2) & dressing, bathing & toileting tasks. She is functioning significantly below her baseline status. OT will continue to follow for tx & recommends inpt rehab upon discharge.  -DT          Row Name 03/18/25 1405                 Therapy Assessment/Plan (OT)     Rehab Potential (OT) good  -DT       Criteria for Skilled Therapeutic Interventions Met (OT) yes;meets criteria;skilled treatment is necessary  -DT       Therapy Frequency (OT) 5 times/wk  -DT       Predicted Duration of Therapy Intervention (OT) until d/c  -DT          Row Name 03/18/25 1402                 Therapy Plan Review/Discharge Plan (OT)     Anticipated Discharge Disposition (OT) inpatient rehabilitation facility  -DT          Row Name 03/18/25 1400                 Vital Signs     Pre Systolic BP Rehab 171  -DT       Pre Treatment Diastolic BP 82  -DT       Pretreatment Heart Rate (beats/min) 62  -DT       Pretreatment Resp Rate (breaths/min) 17  -DT       Pre SpO2 (%) 96  -DT       O2 Delivery Pre Treatment nasal cannula  2  -DT          Row Name 03/18/25 1401                 Positioning and Restraints     Pre-Treatment Position in bed  -DT       Post Treatment Position chair  -DT       In Chair notified nsg;reclined;sitting;call light  within reach;encouraged to call for assist;exit alarm on;legs elevated  -DT                       User Key  (r) = Recorded By, (t) = Taken By, (c) = Cosigned By        Initials Name Provider Type     Ai Norton OT Occupational Therapist                            Outcome Measures         Row Name 03/18/25 1000 03/18/25 0800            How much help from another person do you currently need...     Turning from your back to your side while in flat bed without using bedrails? 3  -JL 3  -JL     Moving from lying on back to sitting on the side of a flat bed without bedrails? 3  -JL 3  -JL     Moving to and from a bed to a chair (including a wheelchair)? 3  -JL 3  -JL     Standing up from a chair using your arms (e.g., wheelchair, bedside chair)? 2  -JL 2  -JL     Climbing 3-5 steps with a railing? 2  -JL 2  -JL     To walk in hospital room? 2  -JL 2  -JL     AM-PAC 6 Clicks Score (PT) 15  -JL 15  -JL     Highest Level of Mobility Goal 4 --> Transfer to chair/commode  -JL 4 --> Transfer to chair/commode  -JL        Row Name 03/18/25 1413                 Modified [place] Scale     Pre-Stroke Modified [place] Scale 0 - No Symptoms at all.  -DT       Modified [place] Scale 4 - Moderately severe disability.  Unable to walk without assistance, and unable to attend to own bodily needs without assistance.  -DT          Row Name 03/18/25 1413                 Functional Assessment     Outcome Measure Options Modified [place]  -DT                       User Key  (r) = Recorded By, (t) = Taken By, (c) = Cosigned By        Initials Name Provider Type     Ai Norton OT Occupational Therapist     Ro Meraz RN Registered Nurse                             Occupational Therapy Education            Title: PT OT SLP Therapies (Done)         Topic: Occupational Therapy (Done)         Point: ADL training (Done)         Learning Progress Summary             Patient Acceptance, E,TB, VU by ANDRIA at 3/18/2025  1413     Comment: Role of OT, goals & POC, safety prec, stroke education                            Point: Home exercise program (Done)         Learning Progress Summary             Patient Acceptance, E,TB, VU by DT at 3/18/2025 1413     Comment: Role of OT, goals & POC, safety prec, stroke education                            Point: Precautions (Done)         Learning Progress Summary             Patient Acceptance, E,TB, VU by DT at 3/18/2025 1413     Comment: Role of OT, goals & POC, safety prec, stroke education                            Point: Body mechanics (Done)         Learning Progress Summary             Patient Acceptance, E,TB, VU by DT at 3/18/2025 1413     Comment: Role of OT, goals & POC, safety prec, stroke education                                                User Key         Initials Effective Dates Name Provider Type Discipline     DT 07/11/23 -  Ai Cedillo, OT Occupational Therapist OT                          OT Recommendation and Plan  Planned Therapy Interventions (OT): activity tolerance training, BADL retraining, functional balance retraining, neuromuscular control/coordination retraining, occupation/activity based interventions, patient/caregiver education/training, ROM/therapeutic exercise, strengthening exercise, transfer/mobility retraining  Therapy Frequency (OT): 5 times/wk  Plan of Care Review  Plan of Care Reviewed With: patient  Outcome Evaluation: Pt is a 74 y.o. F adm to [facility] with c/o left side weakness, LUE numbness & general weakness causing fall at home. Pt c/o dizziness prior to episode & was also unable to get up on her own. On arrival in ED, her BP was 220/120. MRI: PMH: arthritis and previous stroke  (2010 affecting right side with no long term deficits), anxiety, depression.  On arrival to the ED, her blood pressure was noted to be 220/120. Pt adm with acute left sided weakness; chronic lacunar infarct in the posterior left putamen; acute infarct within  the lateral right thalamus likely 2/2 HTN. At baseline, pt lives with son & DIL in a SSH with 1 GILBERT. She reports she is ind with all BADLs, does all cooking, her own laundry & helps with cleaning dishes. She ambulates independently with an AD, but does own a SPC. Upon eval, pt is A&O X4. She has dec LUE/LLE strength & reports dec sensation in LUE. Pt has hx of chronic left shoulder ROM deficits & demo approx 45 degree AROM flex/abd this date. She requires assistance for bed mobilty (min-mod A), ADL transfers (max A X2) & dressing, bathing & toileting tasks. She is functioning significantly below her baseline status. OT will continue to follow for tx & recommends inpt rehab upon discharge.      Time Calculation:        Time Calculation- OT         Row Name 25 1414                       Time Calculation- OT     OT Start Time 923  -DT         OT Stop Time 957  -DT         OT Time Calculation (min) 34 min  -DT         OT Received On 25  -DT         OT - Next Appointment 25  -DT         OT Goal Re-Cert Due Date 25  -DT                      User Key  (r) = Recorded By, (t) = Taken By, (c) = Cosigned By        Initials Name Provider Type     DT Ai Cedillo, OT Occupational Therapist                                Ai Cedillo OT                     3/18/2025                Tabatha Gunn, PT Student   PT Student  Physical Therapy  Therapy Evaluation     Attested  Date of Service:  25  Creation Time:  25     Attested            Attestation signed by Michelle Bob PT at 25 1708     Note reviewed and approved.      Michelle Bob, PT, DPT, NCS               Expand All Collapse All  Patient Name: Lexi Gilbert                      : 1950                      MRN: [id number]                              Today's Date: 3/18/2025                                   Admit Date: 3/17/2025                        Visit Dx:   Visit Diagnosis        ICD-10-CM ICD-9-CM   1. Accelerated hypertension  I10 401.0   2. Weakness  R53.1 780.79   3. Fall, initial encounter  W19.XXXA E888.9         Problem List       Patient Active Problem List   Diagnosis    Depression    Dysfunction of eustachian tube    Gastroesophageal reflux disease    Hyperlipidemia    Hypertension    Hypothyroidism    Menopause present    Vitamin D deficiency    Pre-op evaluation    Osteoarthritis of right knee    Smoker    COPD (chronic obstructive pulmonary disease)    CVA (cerebral vascular accident)    Calculus of ureter    Accelerated hypertension         Medical History        Past Medical History:   Diagnosis Date    Arthritis       right knee, sched scope    Bronchitis, chronic      CVA (cerebral infarction)       5 years ago    Hypertension      Hypothyroidism      IBS (irritable bowel syndrome)      Stroke 12/2011         Surgical History         Past Surgical History:   Procedure Laterality Date    BELPHAROPTOSIS REPAIR Bilateral 2003    HYSTERECTOMY        KNEE ARTHROSCOPY Right 5/20/2016     Procedure: KNEE ARTHROSCOPY debride of mmt  chondraplasty mfc/ lfc and patella;  Surgeon: Danie Crowell MD;  Location: [facility];  Service:     MOUTH SURGERY               General Information         Row Name 03/18/25 1627                 Physical Therapy Time and Intention     Document Type evaluation  -SS (r) AD (t) SS (c)       Mode of Treatment physical therapy  -SS (r) AD (t) SS (c)          Row Name 03/18/25 4704                 General Information     Patient Profile Reviewed yes  -SS (r) AD (t) SS (c)       Prior Level of Function independent:;all household mobility;community mobility;gait;bed mobility;ADL's  -SS (r) AD (t) SS (c)       Existing Precautions/Restrictions fall  pt with fall this encounter  -SS (r) AD (t) SS (c)       Barriers to Rehab none identified  -SS (r) AD (t) SS (c)          Row Name 03/18/25 4622                 Living Environment     Current Living Arrangements home   -SS (r) AD (t) SS (c)       People in Home child(levi), adult  son and dtr-in-law  -SS (r) AD (t) SS (c)          Row Name 03/18/25 1625                 Cognition     Orientation Status (Cognition) oriented x 4  -SS (r) AD (t) SS (c)          Row Name 03/18/25 1625                 Safety Issues/Impairments Affecting Functional Mobility     Impairments Affecting Function (Mobility) balance;endurance/activity tolerance;motor control;motor planning;pain;postural/trunk control;range of motion (ROM);shortness of breath;strength  -SS (r) AD (t) SS (c)                       User Key  (r) = Recorded By, (t) = Taken By, (c) = Cosigned By        Initials Name Provider Type     SS Michelle Bob, PT Physical Therapist     Tabatha Yanez PT Student PT Student                            Mobility         Row Name 03/18/25 1629                 Bed Mobility     Bed Mobility rolling left;supine-sit  -SS (r) AD (t) SS (c)       Rolling Left [place] (Bed Mobility) minimum assist (75% patient effort)  -SS (r) AD (t) SS (c)       Supine-Sit [place] (Bed Mobility) moderate assist (50% patient effort)  -SS (r) AD (t) SS (c)       Assistive Device (Bed Mobility) bed rails  -SS (r) AD (t) SS (c)          Row Name 03/18/25 1629                 Bed-Chair Transfer     Bed-Chair [place] (Transfers) maximum assist (25% patient effort);2 person assist  -SS (r) AD (t) SS (c)       Assistive Device (Bed-Chair Transfers) lift device  sarita sergiody  -SS (r) AD (t) SS (c)          Row Name 03/18/25 1629                 Sit-Stand Transfer     Sit-Stand [place] (Transfers) minimum assist (75% patient effort)  -SS (r) AD (t) SS (c)          Row Name 03/18/25 1629                 Gait/Stairs (Locomotion)     Patient was able to Ambulate no, other medical factors prevent ambulation  -SS (r) AD (t) SS (c)       Reason Patient was unable to Ambulate --  weakness and L knee buckling  -SS (r) AD (t) SS (c)                       User Key   (r) = Recorded By, (t) = Taken By, (c) = Cosigned By        Initials Name Provider Type     SS Michelle Bob, PT Physical Therapist     Tabatha Yanez, PT Student PT Student                            Obj/Interventions         Row Name 03/18/25 1631                 Range of Motion Comprehensive     General Range of Motion lower extremity range of motion deficits identified  -SS (r) AD (t) SS (c)       Comment, General Range of Motion LLE AROM impaired, RLE WFL  -SS (r) AD (t) SS (c)          Row Name 03/18/25 1631                 Strength Comprehensive (MMT)     General Manual Muscle Testing (MMT) Assessment lower extremity strength deficits identified  -SS (r) AD (t) SS (c)       Comment, General Manual Muscle Testing (MMT) Assessment LLE strength 3/5, RLE 4/5 per functional assessment  -SS (r) AD (t) SS (c)          Row Name 03/18/25 1631                 Motor Skills     Motor Skills functional endurance  -SS (r) AD (t) SS (c)       Functional Endurance fair, pt with subjective complains of SOA and increased RR  -SS (r) AD (t) SS (c)          Row Name 03/18/25 1631                 Balance     Balance Assessment sitting static balance;standing static balance  -SS (r) AD (t) SS (c)       Static Sitting Balance minimal assist;verbal cues  -SS (r) AD (t) SS (c)       Position, Sitting Balance sitting edge of bed  -SS (r) AD (t) SS (c)       Static Standing Balance maximum assist  -SS (r) AD (t) SS (c)          Row Name 03/18/25 1631                 Sensory Assessment (Somatosensory)     Sensory Assessment (Somatosensory) LE sensation intact  -SS (r) AD (t) SS (c)                       User Key  (r) = Recorded By, (t) = Taken By, (c) = Cosigned By        Initials Name Provider Type     SS Michelle Bob, PT Physical Therapist     Tabatha Yanez, PT Student PT Student                            Goals/Plan         Row Name 03/18/25 1655                 Bed Mobility Goal 1 (PT)     Activity/Assistive Device  (Bed Mobility Goal 1, PT) bed mobility activities, all  -SS (r) AD (t) SS (c)       [place] Level/Cues Needed (Bed Mobility Goal 1, PT) modified independence  -SS (r) AD (t) SS (c)       Time Frame (Bed Mobility Goal 1, PT) long term goal (LTG);2 weeks  -SS (r) AD (t) SS (c)          Row Name 03/18/25 4117                 Transfer Goal 1 (PT)     Activity/Assistive Device (Transfer Goal 1, PT) transfers, all  -SS (r) AD (t) SS (c)       [place] Level/Cues Needed (Transfer Goal 1, PT) contact guard required  -SS (r) AD (t) SS (c)       Time Frame (Transfer Goal 1, PT) long term goal (LTG);2 weeks  -SS (r) AD (t) SS (c)          Row Name 03/18/25 8849                 Gait Training Goal 1 (PT)     Activity/Assistive Device (Gait Training Goal 1, PT) gait (walking locomotion);walker, rolling  -SS (r) AD (t) SS (c)       [place] Level (Gait Training Goal 1, PT) minimum assist (75% or more patient effort);contact guard required  -SS (r) AD (t) SS (c)       Distance (Gait Training Goal 1, PT) 40 ft  -SS (r) AD (t) SS (c)       Time Frame (Gait Training Goal 1, PT) long term goal (LTG);2 weeks  -SS (r) AD (t) SS (c)          Row Name 03/18/25 3821                 Therapy Assessment/Plan (PT)     Planned Therapy Interventions (PT) balance training;bed mobility training;gait training;home exercise program;transfer training;strengthening;patient/family education;neuromuscular re-education;postural re-education;ROM (range of motion)  -SS (r) AD (t) SS (c)                    User Key  (r) = Recorded By, (t) = Taken By, (c) = Cosigned By        Initials Name Provider Type     Michelle Molina, PT Physical Therapist     Tabatha Yanez, PT Student PT Student                         Clinical Impression         Row Name 03/18/25 2424                 Pain     Additional Documentation Pain Scale: FACES Pre/Post-Treatment (Group)  -SS (r) AD (t) SS (c)          Row Name 03/18/25 0992                 Pain Scale:  FACES Pre/Post-Treatment     Pain: FACES Scale, Pretreatment 4-->hurts little more  -SS (r) AD (t) SS (c)       Posttreatment Pain Rating 4-->hurts little more  -SS (r) AD (t) SS (c)          Row Name 03/18/25 6775                 Plan of Care Review     Plan of Care Reviewed With patient  -SS (r) AD (t) SS (c)       Outcome Evaluation 74 y.o. female with a CMH of hypothyroidism, anxiety/depression, hx of stroke ~15 years ago who presented on 3/17 with left-sided weakness. On arrival to the ED, her blood pressure was noted to be 220/120 troponin of 21 and glucose 118. tPA was not given on arrival. Per Neuro, pt with Chronic lacunar infarct in the posterior left putamen and Acute infarct within the lateral right thalamus likely 2/2 HTN. At baseline, pt lives in with her son and DIL in a H with 1 GILBERT. Pt was independent with ambulation in home and community and denied AD use prior to admission. Pt able to complete all ADLs independently at baseline. This date, pt required Marie to roll left and for supine to sit. Pt required Marie-CGA for static sitting balance and Max A to come to stand from EOB. Pt with frequent L knee buckling in standing. Pt completed transfer from bed to chair requiring MaxA x2 with sarita stedy. Pt with L trun lean when in sarita stedy and reauired assist to manage LUE. Pt with desaturation during activity to mid 80's on RA but recovered quickly to 96% SpO2 with seated rest break. Due to assist level required and significant decline from independent baseline mobility, recommending Inpatient Rehab at d/c. Pt will follow and progress as tolerated.  -SS (r) AD (t) SS (c)          Row Name 03/18/25 5549                 Therapy Assessment/Plan (PT)     Rehab Potential (PT) good  -SS (r) AD (t) SS (c)       Criteria for Skilled Interventions Met (PT) yes;meets criteria;skilled treatment is necessary  -SS (r) AD (t) SS (c)       Therapy Frequency (PT) 5 times/wk  -SS (r) AD (t) SS (c)       Predicted  Duration of Therapy Intervention (PT) until d/c  -SS (r) AD (t) SS (c)          Row Name 03/18/25 1635                 Vital Signs     Pre SpO2 (%) 94  -SS (r) AD (t) SS (c)       O2 Delivery Pre Treatment room air  -SS (r) AD (t) SS (c)       Post SpO2 (%) 96  -SS (r) AD (t) SS (c)       O2 Delivery Post Treatment room air  -SS (r) AD (t) SS (c)          Row Name 03/18/25 1635                 Positioning and Restraints     Pre-Treatment Position in bed  -SS (r) AD (t) SS (c)       Post Treatment Position chair  -SS (r) AD (t) SS (c)       In Chair notified nsg;reclined;call light within reach;encouraged to call for assist;exit alarm on  -SS (r) AD (t) SS (c)                       User Key  (r) = Recorded By, (t) = Taken By, (c) = Cosigned By        Initials Name Provider Type     SS Michelle Bob, PT Physical Therapist     Tabatha Yanez, PT Student PT Student                            Outcome Measures         Row Name 03/18/25 1658 03/18/25 1000            How much help from another person do you currently need...     Turning from your back to your side while in flat bed without using bedrails? 3  -SS (r) AD (t) SS (c) 3  -JL     Moving from lying on back to sitting on the side of a flat bed without bedrails? 3  -SS (r) AD (t) SS (c) 3  -JL     Moving to and from a bed to a chair (including a wheelchair)? 2  -SS (r) AD (t) SS (c) 3  -JL     Standing up from a chair using your arms (e.g., wheelchair, bedside chair)? 2  -SS (r) AD (t) SS (c) 2  -JL     Climbing 3-5 steps with a railing? 1  -SS (r) AD (t) SS (c) 2  -JL     To walk in hospital room? 1  -SS (r) AD (t) SS (c) 2  -JL     AM-PAC 6 Clicks Score (PT) 12  -SS (r) AD (t) 15  -JL     Highest Level of Mobility Goal 4 --> Transfer to chair/commode  -SS (r) AD (t) 4 --> Transfer to chair/commode  -        Row Name 03/18/25 0800                 How much help from another person do you currently need...     Turning from your back to your side while in  flat bed without using bedrails? 3  -JL       Moving from lying on back to sitting on the side of a flat bed without bedrails? 3  -JL       Moving to and from a bed to a chair (including a wheelchair)? 3  -JL       Standing up from a chair using your arms (e.g., wheelchair, bedside chair)? 2  -JL       Climbing 3-5 steps with a railing? 2  -JL       To walk in hospital room? 2  -JL       AM-PAC 6 Clicks Score (PT) 15  -JL       Highest Level of Mobility Goal 4 --> Transfer to chair/commode  -JL          Row Name 03/18/25 1658 03/18/25 1413            Modified [place] Scale     Pre-Stroke Modified [place] Scale 0 - No Symptoms at all.  -SS (r) AD (t) SS (c) 0 - No Symptoms at all.  -DT     Modified [place] Scale 4 - Moderately severe disability.  Unable to walk without assistance, and unable to attend to own bodily needs without assistance.  -SS (r) AD (t) SS (c) 4 - Moderately severe disability.  Unable to walk without assistance, and unable to attend to own bodily needs without assistance.  -DT        Row Name 03/18/25 1413                 Functional Assessment     Outcome Measure Options Modified Tyler  -DT                       User Key  (r) = Recorded By, (t) = Taken By, (c) = Cosigned By        Initials Name Provider Type     SS Michelle Bob, PT Physical Therapist     Ai Norton, OT Occupational Therapist     Ro Meraz, RN Registered Nurse     Tabatha Yanez, PT Student PT Student                          Physical Therapy Education            Title: PT OT SLP Therapies (Done)         Topic: Physical Therapy (Done)         Point: Mobility training (Done)         Learning Progress Summary             Patient Acceptance, E, VU by AD at 3/18/2025 1657                                                User Key         Initials Effective Dates Name Provider Type Discipline     AD 01/28/25 -  Tabatha Gunn, PT Student PT Student PT                          PT Recommendation and Plan  Planned  Therapy Interventions (PT): balance training, bed mobility training, gait training, home exercise program, transfer training, strengthening, patient/family education, neuromuscular re-education, postural re-education, ROM (range of motion)  Outcome Evaluation: 74 y.o. female with a CMH of hypothyroidism, anxiety/depression, hx of stroke ~15 years ago who presented on 3/17 with left-sided weakness. On arrival to the ED, her blood pressure was noted to be 220/120 troponin of 21 and glucose 118. tPA was not given on arrival. Per Neuro, pt with Chronic lacunar infarct in the posterior left putamen and Acute infarct within the lateral right thalamus likely 2/2 HTN. At baseline, pt lives in with her son and DIL in a [facility] with 1 GILBERT. Pt was independent with ambulation in home and community and denied AD use prior to admission. Pt able to complete all ADLs independently at baseline. This date, pt required Marie to roll left and for supine to sit. Pt required Marie-CGA for static sitting balance and Max A to come to stand from EOB. Pt with frequent L knee buckling in standing. Pt completed transfer from bed to chair requiring MaxA x2 with sarita stedy. Pt with L trun lean when in sarita stedy and reauired assist to manage LUE. Pt with desaturation during activity to mid 80's on RA but recovered quickly to 96% SpO2 with seated rest break. Due to assist level required and significant decline from independent baseline mobility, recommending Inpatient Rehab at d/c. Pt will follow and progress as tolerated.      Time Calculation:   PT Evaluation Complexity  History, PT Evaluation Complexity: 3 or more personal factors and/or comorbidities  Examination of Body Systems (PT Eval Complexity): total of 3 or more elements  Clinical Presentation (PT Evaluation Complexity): evolving  Clinical Decision Making (PT Evaluation Complexity): moderate complexity  Overall Complexity (PT Evaluation Complexity): moderate complexity       PT Charges          Row Name 03/18/25 1657                       Time Calculation     Start Time 1445  -SS (r) AD (t) SS (c)         Stop Time 1521  -SS (r) AD (t) SS (c)         Time Calculation (min) 36 min  -SS (r) AD (t)         PT Received On 03/18/25  -SS (r) AD (t) SS (c)         PT - Next Appointment 03/19/25  -SS (r) AD (t) SS (c)         PT Goal Re-Cert Due Date 04/01/25  -SS (r) AD (t) SS (c)                 Time Calculation- PT     Total Timed Code Minutes- PT 0 minute(s)  -SS (r) AD (t) SS (c)                 Untimed Charges     PT Eval/Re-eval Minutes 36  -SS (r) AD (t) SS (c)                 Total Minutes     Untimed Charges Total Minutes 36  -SS (r) AD (t)          Total Minutes 36  -SS (r) AD (t)                      User Key  (r) = Recorded By, (t) = Taken By, (c) = Cosigned By        Initials Name Provider Type     SS Michelle Bob, PT Physical Therapist     AD Tabatha Gunn PT Student PT Student                       Therapy Charges for Today         Code Description Service Date Service Provider Modifiers Qty     10196379469 HC PT EVAL MOD COMPLEXITY 4 3/18/2025 Tabatha Gunn, PT Student GP 1                PT G-Codes  Outcome Measure Options: Modified [place]  AM-PAC 6 Clicks Score (PT): 12  Modified [place] Scale: 4 - Moderately severe disability.  Unable to walk without assistance, and unable to attend to own bodily needs without assistance.  PT Discharge Summary  Anticipated Discharge Disposition (PT): inpatient rehabilitation facility     JANINA Hutchins                 3/18/2025                                Cosigned by: Michelle Bob, PT at 03/18/25 1708     Revision History    Date/Time User Provider Type Action   03/18/25 1708 Michelle Bob PT Physical Therapist Cosign   03/18/25 1706 Tabatha Gunn PT Student PT Student Sign

## 2025-03-19 NOTE — CASE MANAGEMENT/SOCIAL WORK
Continued Stay Note   [facility]     Patient Name: Lexi Gilbert  MRN: [id number]  Today's Date: 3/19/2025    Admit Date: 3/17/2025    Plan: D/C Plan: [facility]. (Pt did not have preference); Precert required; NO PASRR needed.  Transport by facility [facility]   Discharge Plan       Row Name 03/19/25 1311       Plan    Plan D/C Plan: [facility]. (Pt did not have preference); Precert required; NO PASRR needed.  Transport by facility [facility] RN  RN/.  Office Ph. [phone]  Cell Ph.  [phone]

## 2025-03-19 NOTE — PROGRESS NOTES
[facility] MEDICINE SERVICE  DAILY PROGRESS NOTE    NAME: Lexi Gilbert  : 1950  MRN: [id number]      LOS: 1 day     PROVIDER OF SERVICE: Regino Ramos MD    Chief Complaint: Accelerated hypertension    Subjective:     Interval History:  History taken from: Patient and patient's chart     Denies any new complaints. BP noted to be elevated this am, meds adjusted.         Review of Systems:   Review of Systems  All negative except above   Objective:     Vital Signs  Temp:  [97.4 °F (36.3 °C)-98.1 °F (36.7 °C)] 97.5 °F (36.4 °C)  Heart Rate:  [56-66] 56  Resp:  [12-29] 20  BP: (146-156)/(66-74) 150/66  Flow (L/min) (Oxygen Therapy):  [3] 3   Body mass index is 44.56 kg/m².    Physical Exam  Physical Exam  General: Alert and oriented, no acute distress.  Lungs: Clear to auscultation, nonlabored respiration.  Heart: RRR, no murmur, gallop or edema.  Abdomen: Soft, nontender, nondistended, + bowel sounds.  Neuro: alert and awake, left sided weakness        Diagnostic Data    Results from last 7 days   Lab Units 25  0529 25  0615 25  0354   WBC 10*3/mm3 7.29   < > 8.83   HEMOGLOBIN g/dL 13.1   < > 15.0   HEMATOCRIT % 42.0   < > 47.7*   PLATELETS 10*3/mm3 236   < > 253   GLUCOSE mg/dL 100*   < > 118*   CREATININE mg/dL 0.63   < > 0.67   BUN mg/dL 19   < > 19   SODIUM mmol/L 142   < > 142   POTASSIUM mmol/L 4.2   < > 4.3   AST (SGOT) U/L  --   --  19   ALT (SGPT) U/L  --   --  11   ALK PHOS U/L  --   --  81   BILIRUBIN mg/dL  --   --  0.3   ANION GAP mmol/L 12.6   < > 11.5    < > = values in this interval not displayed.       XR Spine Lumbar 1 View  Result Date: 3/18/2025  Impression: Only a single frontal view was obtained of the lumbar spine. Recommend lateral view. Electronically Signed: Phoebe Badillo  3/18/2025 10:52 PM EDT  Workstation ID: [id number]    Adult Transthoracic Echo Complete W/ Cont if Necessary Per Protocol  Addendum Date: 3/17/2025    Left ventricular systolic  function is normal. Calculated left ventricular EF = 60% Left ventricular ejection fraction appears to be 56 - 60%.   Left ventricular diastolic function is consistent with (grade I) impaired relaxation.  Average GLS -13.5%.   Estimated right ventricular systolic pressure from tricuspid regurgitation is normal (<35 mmHg).   There is a small (<1cm) pericardial effusion. There is no evidence of cardiac tamponade.   No significant valvular abnormalities noted.     MRI Brain Without Contrast  Result Date: 3/17/2025  Impression: 1. Small punctate area of acute ischemia at junction of lateral right thalamus and posterior limb of right internal capsule. 2. Extensive white matter findings most compatible with moderate to severe chronic microvascular disease. Electronically Signed: Mejia Adams MD  3/17/2025 4:08 PM EDT  Workstation ID: [id number]    CT Angiogram Carotids  Result Date: 3/17/2025  1.No large vessel occlusive thrombus, flow-limiting stenosis or aneurysm is seen intracranially. 2.Focal high-grade stenosis is suggested within the distal right vertebral artery at the level of the skull base just proximal to the basilar artery. 3.Mild to moderate short segment stenosis is suggested within a right M2 insular branch. Electronically Signed: Naty Haynes MD  3/17/2025 1:55 PM EDT  Workstation ID: [id number]    CT Angiogram Head  Result Date: 3/17/2025  1.No large vessel occlusive thrombus, flow-limiting stenosis or aneurysm is seen intracranially. 2.Focal high-grade stenosis is suggested within the distal right vertebral artery at the level of the skull base just proximal to the basilar artery. 3.Mild to moderate short segment stenosis is suggested within a right M2 insular branch. Electronically Signed: Naty Haynes MD  3/17/2025 1:55 PM EDT  Workstation ID: [id number]        I have reviewed patient labs and imaging     Assessment/Plan:     Active and Resolved Problems  # chronic lacunar infarct in the posterior left  putamen   # Acute infarct within the lateral right thalamus likely 2/2 HTN  -Patient presented with left arm and left leg weakness since around 4am. She has a hx of stroke and states her symptoms are similar to her prior stroke. tPA was not given on arrival.   -Reviewed CTH - chronic small vessel ischemic disease and chronic lacunar infarct. No acute findings.   -Will obtain stat CTA head and carotids  -MRI ordered and pending at this time  -NIHSS BID and neuro-checks Q4H  -Start aspirin  -OT/PT consult  -ECHO ordered and pending  -Telemetry  -Allow permissive hypertension for now pending MRI  -Consult neurology -- appreciate further recommendations     # Hypertensive emergency  -Initial blood pressure 220/120.  She was given one dose of IV Labetalol.  Blood pressure trending down, currently 151/79.  Will hold additional antihypertensive agents for now to allow for permissive hypertension.  -Troponin trending up 21->31, likely secondary to severe hypertension. She is without chest pain.  EKG reveals LBBB which is chronic, has been present since at least 2016.  -denies any chest pain  -troponin now trending down   -start Tab Amlodipine 10 mg and Losartan 50 mg  -monitor BP and adjust medications as appropriate       # Anxiety/depression  -Continue lexapro     # Hypothyroidism  -Continue levothyroxine.       VTE Prophylaxis:  Mechanical VTE prophylaxis orders are present.             Disposition Planning:     Barriers to Discharge:medical clearance , safe dispo   Anticipated Date of Discharge: 03/20  Place of Discharge: likely rehab       Time: 40 minutes     Code Status and Medical Interventions: CPR (Attempt to Resuscitate); Full Support   Ordered at: 03/17/25 0559     Code Status (Patient has no pulse and is not breathing):    CPR (Attempt to Resuscitate)     Medical Interventions (Patient has pulse or is breathing):    Full Support       Signature: Electronically signed by Regino Ramos MD, 03/19/25,  12:51 EDT.  [facility]

## 2025-03-19 NOTE — PLAN OF CARE
Problem: Adult Inpatient Plan of Care  Goal: Plan of Care Review  Outcome: Progressing  Goal: Patient-Specific Goal (Individualized)  Outcome: Progressing  Goal: Absence of Hospital-Acquired Illness or Injury  Outcome: Progressing  Intervention: Identify and Manage Fall Risk  Recent Flowsheet Documentation  Taken 3/19/2025 0600 by Debi Pteerson RN  Safety Promotion/Fall Prevention:   activity supervised   assistive device/personal items within reach   clutter free environment maintained   fall prevention program maintained   lighting adjusted   nonskid shoes/slippers when out of bed   room organization consistent   safety round/check completed  Taken 3/19/2025 0200 by Debi Peterson RN  Safety Promotion/Fall Prevention:   activity supervised   assistive device/personal items within reach   clutter free environment maintained   fall prevention program maintained   lighting adjusted   nonskid shoes/slippers when out of bed   room organization consistent   safety round/check completed  Taken 3/19/2025 0000 by Debi Peterson RN  Safety Promotion/Fall Prevention:   activity supervised   assistive device/personal items within reach   clutter free environment maintained   fall prevention program maintained   safety round/check completed   room organization consistent  Taken 3/18/2025 2200 by Debi Peterson RN  Safety Promotion/Fall Prevention:   activity supervised   assistive device/personal items within reach   clutter free environment maintained   fall prevention program maintained   lighting adjusted   nonskid shoes/slippers when out of bed   room organization consistent   safety round/check completed  Taken 3/18/2025 2000 by Debi Peterson RN  Safety Promotion/Fall Prevention:   safety round/check completed   room organization consistent   activity supervised   assistive device/personal items within reach   clutter free environment maintained   fall prevention program maintained  Intervention:  Prevent Skin Injury  Recent Flowsheet Documentation  Taken 3/19/2025 0400 by Debi Peterson RN  Skin Protection: transparent dressing maintained  Taken 3/18/2025 2000 by Debi Peterson RN  Body Position: position changed independently  Skin Protection:   transparent dressing maintained   incontinence pads utilized  Intervention: Prevent and Manage VTE (Venous Thromboembolism) Risk  Recent Flowsheet Documentation  Taken 3/18/2025 2000 by Debi Peterson RN  VTE Prevention/Management:   patient refused intervention   SCDs (sequential compression devices) off  Intervention: Prevent Infection  Recent Flowsheet Documentation  Taken 3/19/2025 0600 by Debi Peterson RN  Infection Prevention:   environmental surveillance performed   hand hygiene promoted   rest/sleep promoted   single patient room provided   personal protective equipment utilized   equipment surfaces disinfected  Taken 3/19/2025 0400 by Debi Peterson RN  Infection Prevention:   environmental surveillance performed   equipment surfaces disinfected   hand hygiene promoted   personal protective equipment utilized   rest/sleep promoted   single patient room provided  Taken 3/19/2025 0200 by Debi Peterson RN  Infection Prevention:   environmental surveillance performed   hand hygiene promoted   rest/sleep promoted   single patient room provided   personal protective equipment utilized   equipment surfaces disinfected  Taken 3/19/2025 0000 by Debi Peterson RN  Infection Prevention:   environmental surveillance performed   equipment surfaces disinfected   hand hygiene promoted   personal protective equipment utilized   rest/sleep promoted   single patient room provided  Taken 3/18/2025 2200 by Debi Peterson RN  Infection Prevention:   environmental surveillance performed   hand hygiene promoted   rest/sleep promoted   single patient room provided   personal protective equipment utilized   equipment surfaces disinfected  Taken  3/18/2025 2000 by Debi Peterson RN  Infection Prevention:   single patient room provided   personal protective equipment utilized   hand hygiene promoted   equipment surfaces disinfected   environmental surveillance performed  Goal: Optimal Comfort and Wellbeing  Outcome: Progressing  Intervention: Monitor Pain and Promote Comfort  Recent Flowsheet Documentation  Taken 3/18/2025 2000 by Debi Peterson RN  Pain Management Interventions: (rolled up towel behind her neck)   care clustered   pain management plan reviewed with patient/caregiver   position adjusted   other (see comments)  Intervention: Provide Person-Centered Care  Recent Flowsheet Documentation  Taken 3/18/2025 2000 by Debi Peterson RN  Trust Relationship/Rapport:   care explained   choices provided   questions answered  Goal: Readiness for Transition of Care  Outcome: Progressing     Problem: Skin Injury Risk Increased  Goal: Skin Health and Integrity  Outcome: Progressing  Intervention: Optimize Skin Protection  Recent Flowsheet Documentation  Taken 3/19/2025 0400 by Debi Peterson RN  Pressure Reduction Techniques: frequent weight shift encouraged  Pressure Reduction Devices: pressure-redistributing mattress utilized  Skin Protection: transparent dressing maintained  Taken 3/18/2025 2000 by Debi Peterson RN  Pressure Reduction Techniques: frequent weight shift encouraged  Head of Bed (HOB) Positioning: HOB elevated  Pressure Reduction Devices: pressure-redistributing mattress utilized  Skin Protection:   transparent dressing maintained   incontinence pads utilized     Problem: Stroke, Ischemic (Includes Transient Ischemic Attack)  Goal: Optimal Coping  Outcome: Progressing  Intervention: Support Psychosocial Response to Stroke  Recent Flowsheet Documentation  Taken 3/18/2025 2000 by Debi Peterson RN  Family/Support System Care:   support provided   self-care encouraged  Goal: Effective Bowel Elimination  Outcome:  Progressing  Goal: Optimal Cerebral Tissue Perfusion  Outcome: Progressing  Goal: Optimal Cognitive Function  Outcome: Progressing  Intervention: Optimize Cognitive Function  Recent Flowsheet Documentation  Taken 3/19/2025 0400 by Debi Peterson RN  Reorientation Measures: clock in view  Taken 3/19/2025 0000 by Debi Peterson RN  Reorientation Measures: clock in view  Taken 3/18/2025 2000 by Debi Peterson RN  Reorientation Measures: clock in view  Goal: Improved Communication Skills  Outcome: Progressing  Intervention: Optimize Communication Skills  Recent Flowsheet Documentation  Taken 3/19/2025 0400 by Debi Peterson RN  Communication Enhancement Strategies:   call light answered in person   communication board used  Taken 3/19/2025 0000 by Debi Peterson RN  Communication Enhancement Strategies:   call light answered in person   communication board used  Taken 3/18/2025 2000 by Debi Peterson RN  Communication Enhancement Strategies:   call light answered in person   communication board used  Goal: Optimal Functional Ability  Outcome: Progressing  Goal: Optimal Nutrition Intake  Outcome: Progressing  Goal: Effective Oxygenation and Ventilation  Outcome: Progressing  Intervention: Optimize Oxygenation and Ventilation  Recent Flowsheet Documentation  Taken 3/18/2025 2000 by Debi Peterson RN  Head of Bed (HOB) Positioning: HOB elevated  Goal: Improved Sensorimotor Function  Outcome: Progressing  Intervention: Optimize Range of Motion, Motor Control and Function  Recent Flowsheet Documentation  Taken 3/18/2025 2000 by Debi Peterson RN  Positioning/Transfer Devices:   pillows   in use  Intervention: Optimize Sensory and Perceptual Ability  Recent Flowsheet Documentation  Taken 3/19/2025 0400 by Debi Peterson RN  Pressure Reduction Techniques: frequent weight shift encouraged  Pressure Reduction Devices: pressure-redistributing mattress utilized  Taken 3/18/2025 2000 by Nicholas  LINDA Carrera  Pressure Reduction Techniques: frequent weight shift encouraged  Pressure Reduction Devices: pressure-redistributing mattress utilized  Goal: Safe and Effective Swallow  Outcome: Progressing  Goal: Effective Urinary Elimination  Outcome: Progressing   Goal Outcome Evaluation:

## 2025-03-19 NOTE — PLAN OF CARE
Goal Outcome Evaluation:      Assessment: Lexi Gilbert presents with ADL impairments affecting function including balance, cognition, coordination, endurance / activity tolerance, motor control, muscle tone abnormal, pain, postural / trunk control, shortness of breath, and strength. Demonstrated functioning below baseline abilities indicate the need for continued skilled intervention while inpatient. Pt able to complete CTS at recliner with min mod A and maintain standing for approx 20-30 seconds. Completed chair push ups for strengthening and weight bearing on LUE. Pt educated on PLB for O2 sats. Tolerating session today without incident. Will continue to follow and progress as tolerated.     Plan/Recommendations:   High Intensity Therapy recommended post-acute care. This is recommended as therapy feels the patient would require 5-6 days per week, 2-3 hours per day. At this time, inpatient rehabilitation (acute rehab) would be the first choice and SNF would be second..     Pt desires Inpatient Rehabilitation placement at discharge. Pt cooperative; agreeable to therapeutic recommendations and plan of care.

## 2025-03-19 NOTE — THERAPY TREATMENT NOTE
"Subjective: Pt agreeable to therapeutic plan of care.    Objective:     Precautions - falls    Bed mobility - Min-A  Transfers - Max-A and Assist x 2  Ambulation - N/A or Not attempted.    Therapeutic Exercise -   Seated LE exercises BLE x10 consisting of: Marches, LAQs, and ankle PF/DF    Vitals: Desaturates, on 2L O2    Pain: 8 VAS Location: tailbone  Intervention for pain: RN provided medication    Education: Provided education on the importance of mobility in the acute care setting and HEP    Assessment: Lexi Gilbert 74 y.o. female with a CMH of hypothyroidism, anxiety/depression, hx of stroke ~15 years ago who presented on 3/17 with left-sided weakness. On arrival to the ED, her blood pressure was noted to be 220/120 troponin of 21 and glucose 118. tPA was not given on arrival. Per Neuro, pt with Chronic lacunar infarct in the posterior left putamen and Acute infarct within the lateral right thalamus likely 2/2 HTN. Required verbal cues and Marie for rolling to left and supine to sit this date. Pt required MaxA x2 to transfer to chair with sarita steady lift and verbal cues as pt continues to have left trunk lean. Pt able to complete seated LE strengthening exercises with increased time to complete due to slow movement of LLE. Pt with many subjective reports of tailbone pain, and she is \"unable to find a comfortable position.\" Tolerating session today without incident. Will continue to follow and progress as tolerated.     Plan/Recommendations:   If medically appropriate, High Intensity Therapy recommended post-acute care. This is recommended as therapy feels the patient would require 5-6 days per week, 2-3 hours per day. At this time, inpatient rehabilitation (acute rehab) would be the first choice and SNF would be second. Pt requires no DME at discharge.     Pt desires Inpatient Rehabilitation placement at discharge. Pt cooperative; agreeable to therapeutic recommendations and plan of care.     Modified [place]: " 4 = Moderately severe disability (Unable to attend to own bodily needs without assistance, and unable to walk unassisted)     Post-Tx Position: Up in Chair, Alarms activated, and Call light and personal items within reach, with family/friend  PPE: gloves and surgical mask    Therapy Charges for Today       Code Description Service Date Service Provider Modifiers Qty    41173848560  PT EVAL MOD COMPLEXITY 4 3/18/2025 Tabatha Gunn, PT Student GP 1    80036443302 HC PT THERAPEUTIC ACT EA 15 MIN 3/19/2025 Tabatha Gunn, PT Student GP 1    [id number]  PT THER PROC EA 15 MIN 3/19/2025 Tabatha Gunn, PT Student GP 1           PT Charges       Row Name 03/19/25 1641             Time Calculation    Start Time 1248  -SS (r) AD (t) SS (c)      Stop Time 1310  -SS (r) AD (t) SS (c)      Time Calculation (min) 22 min  -SS (r) AD (t)      PT Received On 03/19/25  -SS (r) AD (t) SS (c)      PT - Next Appointment 03/20/25  -SS (r) AD (t) SS (c)         Time Calculation- PT    Total Timed Code Minutes- PT 22 minute(s)  -SS (r) AD (t) SS (c)                User Key  (r) = Recorded By, (t) = Taken By, (c) = Cosigned By      Initials Name Provider Type    Michelle Molina, PT Physical Therapist    Tabatha Yanez, PT Student PT Student

## 2025-03-19 NOTE — THERAPY TREATMENT NOTE
Subjective: Pt agreeable to therapeutic plan of care.  Cognition: oriented to Person, Place, Time, and Situation, arousal/alertness: Alert and Attentive, safety/judgement: fair, and awareness of deficits: fair awareness of safety precautions and fair awareness of deficits    Objective:     Precautions - desaturates with activity, falls, standard, LUE weakness (hand will slip from arm of chair or walker unexpectedly), L lean with standing    Bed Mobility: N/A or Not attempted. - pt up in recliner  Functional Transfers: N/A or Not attempted. - pt attempted standing at recliner but did not attempt transfers this date.      Balance: supported, with UE support, and standing Mod-A - mod A required to maintain midline as pt tends to lean to the L. Pt able to complete 2 trials of approx 20-30 seconds of standing with min/mod A for maintaining midline posture.   Functional Ambulation: N/A or Not attempted.    Pt up in recliner and c/o shortness of air with positioning. Pt reported few comfortable positions where she did not begin to feel SOA but specifically states that she feels like she cannot breath while in the bed. OT educated pt on PLB as well as diaphragmatic breathing for strengthening and to maximize endurance during therapeutic activities. While practicing PLB and diaphragmatic breathing, pt engaged in 2 trials of standing tolerance with FWW and 5 reps of chair push ups. Pt educated on weight bearing strategies for LUE. LUE noted to have decreased coordination, slipping off of arm of chair or walker on 2 occasions.       Vitals: Hypertensive - slight hypertension after standing (150s over 60s)    Pain: 4 VAS Location: generalized  Interventions for pain: Repositioned, Increased Activity, and Therapeutic Presence  Education: Provided education on the importance of mobility in the acute care setting, Verbal/Tactile Cues, ADL training, Transfer Training, and HEP    Assessment: Lexi Vladimir presents with ADL  impairments affecting function including balance, cognition, coordination, endurance / activity tolerance, motor control, muscle tone abnormal, pain, postural / trunk control, shortness of breath, and strength. Demonstrated functioning below baseline abilities indicate the need for continued skilled intervention while inpatient. Pt able to complete CTS at recliner with min mod A and maintain standing for approx 20-30 seconds. Completed chair push ups for strengthening and weight bearing on LUE. Pt educated on PLB for O2 sats. Tolerating session today without incident. Will continue to follow and progress as tolerated.     Plan/Recommendations:   High Intensity Therapy recommended post-acute care. This is recommended as therapy feels the patient would require 5-6 days per week, 2-3 hours per day. At this time, inpatient rehabilitation (acute rehab) would be the first choice and SNF would be second..     Pt desires Inpatient Rehabilitation placement at discharge. Pt cooperative; agreeable to therapeutic recommendations and plan of care.     Modified [place]: 4 = Moderately severe disability (Unable to attend to own bodily needs without assistance, and unable to walk unassisted)     Post-Tx Position: Up in Chair, Alarms activated, and Call light and personal items within reach  PPE: gloves and surgical mask    Therapy Charges for Today       Code Description Service Date Service Provider Modifiers Qty    86628506373  OT THERAPEUTIC ACT EA 15 MIN 3/19/2025 Rosibel Cedillo OT GO 1    [id number]  OT THER PROC EA 15 MIN 3/19/2025 Rosibel Cedillo OT GO 1    [id number]  CAREGIVER TRAINING STRATEGIES &TQ EA ADDL 15 MIN 3/19/2025 Rosibel Cedillo OT  1           Time Calculation- OT       Row Name 03/19/25 1541             Time Calculation- OT    OT Start Time 1407  -KT      OT Stop Time 1441  -KT      OT Time Calculation (min) 34 min  -KT      Total Timed Code Minutes- OT 34 minute(s)  -KT      OT  Received On 03/19/25  -AJ      OT - Next Appointment 03/20/25  -AJ                User Key  (r) = Recorded By, (t) = Taken By, (c) = Cosigned By      Initials Name Provider Type    Rosibel Muñoz OT Occupational Therapist

## 2025-03-19 NOTE — PLAN OF CARE
"Goal Outcome Evaluation:  Plan of Care Reviewed With: patient    Assessment: Lexi Gilbert 74 y.o. female with a CMH of hypothyroidism, anxiety/depression, hx of stroke ~15 years ago who presented on 3/17 with left-sided weakness. On arrival to the ED, her blood pressure was noted to be 220/120 troponin of 21 and glucose 118. tPA was not given on arrival. Per Neuro, pt with Chronic lacunar infarct in the posterior left putamen and Acute infarct within the lateral right thalamus likely 2/2 HTN. Required verbal cues and Marie for rolling to left and supine to sit this date. Pt required MaxA x2 to transfer to chair with sarita steady lift and verbal cues as pt continues to have left trunk lean. Pt able to complete seated LE strengthening exercises with increased time to complete due to slow movement of LLE. Pt with many subjective reports of tailbone pain, and she is \"unable to find a comfortable position.\" Tolerating session today without incident. Will continue to follow and progress as tolerated.     Anticipated Discharge Disposition (PT): inpatient rehabilitation facility                        "

## 2025-03-20 PROCEDURE — 97535 SELF CARE MNGMENT TRAINING: CPT | Performed by: OCCUPATIONAL THERAPIST

## 2025-03-20 PROCEDURE — 97112 NEUROMUSCULAR REEDUCATION: CPT

## 2025-03-20 PROCEDURE — 97535 SELF CARE MNGMENT TRAINING: CPT

## 2025-03-20 PROCEDURE — 25010000002 ENOXAPARIN PER 10 MG: Performed by: STUDENT IN AN ORGANIZED HEALTH CARE EDUCATION/TRAINING PROGRAM

## 2025-03-20 PROCEDURE — 97110 THERAPEUTIC EXERCISES: CPT | Performed by: OCCUPATIONAL THERAPIST

## 2025-03-20 PROCEDURE — 97112 NEUROMUSCULAR REEDUCATION: CPT | Performed by: OCCUPATIONAL THERAPIST

## 2025-03-20 PROCEDURE — 97530 THERAPEUTIC ACTIVITIES: CPT

## 2025-03-20 RX ORDER — LOSARTAN POTASSIUM 50 MG/1
100 TABLET ORAL
Status: DISCONTINUED | OUTPATIENT
Start: 2025-03-21 | End: 2025-03-26 | Stop reason: HOSPADM

## 2025-03-20 RX ADMIN — ESCITALOPRAM 20 MG: 10 TABLET, FILM COATED ORAL at 08:32

## 2025-03-20 RX ADMIN — LOSARTAN POTASSIUM 50 MG: 50 TABLET, FILM COATED ORAL at 08:32

## 2025-03-20 RX ADMIN — OXYCODONE 5 MG: 5 TABLET ORAL at 21:19

## 2025-03-20 RX ADMIN — Medication 10 ML: at 08:32

## 2025-03-20 RX ADMIN — LEVOTHYROXINE SODIUM 175 MCG: 175 TABLET ORAL at 06:27

## 2025-03-20 RX ADMIN — AMLODIPINE BESYLATE 10 MG: 5 TABLET ORAL at 08:32

## 2025-03-20 RX ADMIN — OXYCODONE 5 MG: 5 TABLET ORAL at 00:17

## 2025-03-20 RX ADMIN — Medication 10 ML: at 21:19

## 2025-03-20 RX ADMIN — OXYCODONE 5 MG: 5 TABLET ORAL at 06:28

## 2025-03-20 RX ADMIN — ASPIRIN 81 MG: 81 TABLET, COATED ORAL at 08:32

## 2025-03-20 RX ADMIN — CLOPIDOGREL BISULFATE 75 MG: 75 TABLET ORAL at 08:32

## 2025-03-20 RX ADMIN — ENOXAPARIN SODIUM 40 MG: 100 INJECTION SUBCUTANEOUS at 17:32

## 2025-03-20 NOTE — PLAN OF CARE
Goal Outcome Evaluation:   Assessment: Lexi Gilbert presents with ADL impairments affecting function including balance, coordination, endurance / activity tolerance, grasp, motor control, postural / trunk control, range of motion (ROM), and strength. Pt feeling more weak this date requiring inc assistance for sit<>stand & has dec LUE elbow strength & ROM.  Pt participating well with therapy. Demonstrated functioning below baseline abilities indicate the need for continued skilled intervention while inpatient. Tolerating session today without incident. Will continue to follow and progress as tolerated.     Plan/Recommendations:   High Intensity Therapy recommended post-acute care. This is recommended as therapy feels the patient would require 5-6 days per week, 2-3 hours per day. At this time, inpatient rehabilitation (acute rehab) would be the first choice and SNF would be second.. Pt requires no DME at discharge.     Pt desires Inpatient Rehabilitation placement at discharge. Pt cooperative; agreeable to therapeutic recommendations and plan of care.

## 2025-03-20 NOTE — PLAN OF CARE
Problem: Adult Inpatient Plan of Care  Goal: Plan of Care Review  Outcome: Progressing  Goal: Patient-Specific Goal (Individualized)  Outcome: Progressing  Goal: Absence of Hospital-Acquired Illness or Injury  Outcome: Progressing  Intervention: Identify and Manage Fall Risk  Recent Flowsheet Documentation  Taken 3/20/2025 0600 by Debi Peterson RN  Safety Promotion/Fall Prevention:   activity supervised   assistive device/personal items within reach   clutter free environment maintained   fall prevention program maintained   lighting adjusted   nonskid shoes/slippers when out of bed   room organization consistent   safety round/check completed  Taken 3/20/2025 0400 by Debi Peterson RN  Safety Promotion/Fall Prevention:   activity supervised   assistive device/personal items within reach   clutter free environment maintained   fall prevention program maintained   room organization consistent   safety round/check completed  Taken 3/20/2025 0200 by Debi Peterson RN  Safety Promotion/Fall Prevention:   activity supervised   assistive device/personal items within reach   clutter free environment maintained   fall prevention program maintained   lighting adjusted   nonskid shoes/slippers when out of bed   room organization consistent   safety round/check completed  Taken 3/19/2025 2200 by Debi Peterson RN  Safety Promotion/Fall Prevention:   activity supervised   assistive device/personal items within reach   clutter free environment maintained   fall prevention program maintained   lighting adjusted   nonskid shoes/slippers when out of bed   room organization consistent   safety round/check completed  Intervention: Prevent Skin Injury  Recent Flowsheet Documentation  Taken 3/19/2025 2000 by Debi Peterson RN  Skin Protection: transparent dressing maintained  Intervention: Prevent Infection  Recent Flowsheet Documentation  Taken 3/20/2025 0600 by Debi Peterson RN  Infection Prevention:    environmental surveillance performed   hand hygiene promoted   rest/sleep promoted   single patient room provided   personal protective equipment utilized   equipment surfaces disinfected  Taken 3/20/2025 0400 by Debi Peterson RN  Infection Prevention:   environmental surveillance performed   equipment surfaces disinfected   hand hygiene promoted   personal protective equipment utilized   rest/sleep promoted   single patient room provided  Taken 3/20/2025 0200 by Debi Peterson RN  Infection Prevention:   environmental surveillance performed   hand hygiene promoted   rest/sleep promoted   single patient room provided   personal protective equipment utilized   equipment surfaces disinfected  Taken 3/19/2025 2200 by Debi Peterson RN  Infection Prevention:   environmental surveillance performed   hand hygiene promoted   rest/sleep promoted   single patient room provided   personal protective equipment utilized   equipment surfaces disinfected  Goal: Optimal Comfort and Wellbeing  Outcome: Progressing  Intervention: Provide Person-Centered Care  Recent Flowsheet Documentation  Taken 3/19/2025 2000 by Debi Peterson RN  Trust Relationship/Rapport:   care explained   choices provided   questions answered  Goal: Readiness for Transition of Care  Outcome: Progressing     Problem: Skin Injury Risk Increased  Goal: Skin Health and Integrity  Outcome: Progressing  Intervention: Optimize Skin Protection  Recent Flowsheet Documentation  Taken 3/19/2025 2000 by Debi Peterson RN  Pressure Reduction Techniques: frequent weight shift encouraged  Pressure Reduction Devices: pressure-redistributing mattress utilized  Skin Protection: transparent dressing maintained     Problem: Stroke, Ischemic (Includes Transient Ischemic Attack)  Goal: Optimal Coping  Outcome: Progressing  Goal: Effective Bowel Elimination  Outcome: Progressing  Goal: Optimal Cerebral Tissue Perfusion  Outcome: Progressing  Goal: Optimal  Cognitive Function  Outcome: Progressing  Goal: Improved Communication Skills  Outcome: Progressing  Intervention: Optimize Communication Skills  Recent Flowsheet Documentation  Taken 3/20/2025 0400 by Debi Peterson RN  Communication Enhancement Strategies:   communication board used   call light answered in person  Taken 3/20/2025 0000 by Debi Peterson RN  Communication Enhancement Strategies:   call light answered in person   communication board used  Taken 3/19/2025 2000 by Debi Peterson RN  Communication Enhancement Strategies:   call light answered in person   communication board used  Goal: Optimal Functional Ability  Outcome: Progressing  Goal: Optimal Nutrition Intake  Outcome: Progressing  Goal: Effective Oxygenation and Ventilation  Outcome: Progressing  Goal: Improved Sensorimotor Function  Outcome: Progressing  Intervention: Optimize Sensory and Perceptual Ability  Recent Flowsheet Documentation  Taken 3/19/2025 2000 by Debi Peterson RN  Pressure Reduction Techniques: frequent weight shift encouraged  Pressure Reduction Devices: pressure-redistributing mattress utilized  Goal: Safe and Effective Swallow  Outcome: Progressing  Goal: Effective Urinary Elimination  Outcome: Progressing   Goal Outcome Evaluation:

## 2025-03-20 NOTE — PLAN OF CARE
Problem: Adult Inpatient Plan of Care  Goal: Plan of Care Review  Outcome: Progressing  Goal: Patient-Specific Goal (Individualized)  Outcome: Progressing  Goal: Absence of Hospital-Acquired Illness or Injury  Outcome: Progressing  Intervention: Identify and Manage Fall Risk  Recent Flowsheet Documentation  Taken 3/20/2025 1800 by Ro Salmon RN  Safety Promotion/Fall Prevention: safety round/check completed  Taken 3/20/2025 1630 by Ro Salmon RN  Safety Promotion/Fall Prevention:   assistive device/personal items within reach   clutter free environment maintained   fall prevention program maintained   nonskid shoes/slippers when out of bed   room organization consistent   safety round/check completed  Taken 3/20/2025 1600 by Ro Salmon RN  Safety Promotion/Fall Prevention: safety round/check completed  Taken 3/20/2025 1400 by Ro Salmon RN  Safety Promotion/Fall Prevention: safety round/check completed  Taken 3/20/2025 1200 by Ro Salmon RN  Safety Promotion/Fall Prevention:   assistive device/personal items within reach   clutter free environment maintained   fall prevention program maintained   nonskid shoes/slippers when out of bed   room organization consistent   safety round/check completed  Taken 3/20/2025 1000 by Ro Salmon RN  Safety Promotion/Fall Prevention: safety round/check completed  Taken 3/20/2025 0800 by Ro Salmon RN  Safety Promotion/Fall Prevention:   assistive device/personal items within reach   clutter free environment maintained   fall prevention program maintained   nonskid shoes/slippers when out of bed   room organization consistent   safety round/check completed  Intervention: Prevent Skin Injury  Recent Flowsheet Documentation  Taken 3/20/2025 1630 by Ro Salmon RN  Body Position: weight shifting  Taken 3/20/2025 1200 by Ro Salmon RN  Body Position: upper extremity elevated  Taken 3/20/2025 0800 by Ro Salmon  DANIS RN  Body Position: weight shifting  Skin Protection: transparent dressing maintained  Intervention: Prevent Infection  Recent Flowsheet Documentation  Taken 3/20/2025 1200 by Ro Salmon RN  Infection Prevention: hand hygiene promoted  Taken 3/20/2025 0800 by Ro Salmon RN  Infection Prevention: hand hygiene promoted  Goal: Optimal Comfort and Wellbeing  Outcome: Progressing  Goal: Readiness for Transition of Care  Outcome: Progressing   Goal Outcome Evaluation:         Patient did not have any new tests or procedures. The patient is on 2L of oxygen. The patient did not appear to be in pain during the shift. Will continue to monitor.

## 2025-03-20 NOTE — PROGRESS NOTES
[facility] MEDICINE SERVICE  DAILY PROGRESS NOTE    NAME: Lexi Gilbert  : 1950  MRN: [id number]      LOS: 2 days     PROVIDER OF SERVICE: Regino Ramos MD    Chief Complaint: Accelerated hypertension    Subjective:     Interval History:  History taken from: Patient and patient's chart     Denies any new complaints.         Review of Systems:   Review of Systems  All negative except above   Objective:     Vital Signs  Temp:  [97.5 °F (36.4 °C)-97.9 °F (36.6 °C)] 97.5 °F (36.4 °C)  Heart Rate:  [60-74] 60  Resp:  [18-19] 18  BP: (138-147)/(62-90) 143/62  Flow (L/min) (Oxygen Therapy):  [2-3] 2   Body mass index is 44.56 kg/m².    Physical Exam  Physical Exam  General: Alert and oriented, no acute distress.  Lungs: Clear to auscultation, nonlabored respiration.  Heart: RRR, no murmur, gallop or edema.  Abdomen: Soft, nontender, nondistended, + bowel sounds.  Neuro: alert and awake, left sided weakness        Diagnostic Data    Results from last 7 days   Lab Units 25  0529 25  0615 25  0354   WBC 10*3/mm3 7.29   < > 8.83   HEMOGLOBIN g/dL 13.1   < > 15.0   HEMATOCRIT % 42.0   < > 47.7*   PLATELETS 10*3/mm3 236   < > 253   GLUCOSE mg/dL 100*   < > 118*   CREATININE mg/dL 0.63   < > 0.67   BUN mg/dL 19   < > 19   SODIUM mmol/L 142   < > 142   POTASSIUM mmol/L 4.2   < > 4.3   AST (SGOT) U/L  --   --  19   ALT (SGPT) U/L  --   --  11   ALK PHOS U/L  --   --  81   BILIRUBIN mg/dL  --   --  0.3   ANION GAP mmol/L 12.6   < > 11.5    < > = values in this interval not displayed.       XR Spine Lumbar 1 View  Result Date: 3/18/2025  Impression: Only a single frontal view was obtained of the lumbar spine. Recommend lateral view. Electronically Signed: Phoebe Badillo  3/18/2025 10:52 PM EDT  Workstation ID: [id number]        I have reviewed patient labs and imaging     Assessment/Plan:     Active and Resolved Problems  # chronic lacunar infarct in the posterior left putamen   # Acute  infarct within the lateral right thalamus likely 2/2 HTN  -Patient presented with left arm and left leg weakness since around 4am. She has a hx of stroke and states her symptoms are similar to her prior stroke. tPA was not given on arrival.   -Reviewed CTH - chronic small vessel ischemic disease and chronic lacunar infarct. No acute findings.   -Will obtain stat CTA head and carotids  -MRI ordered and pending at this time  -NIHSS BID and neuro-checks Q4H  -Start aspirin  -OT/PT consult  -ECHO EF 60%, GIDD  -Telemetry  -Consult neurology -- Plavix load 300 mg once PO +  mg once PO/OH, then ASA 81 mg daily PO and Plavix 75 mg daily PO x21 days then transition to ASA 81 mg daily PO monotherapy    -continue statin   - MCOT at discharge      # Hypertensive emergency  -Initial blood pressure 220/120.  She was given one dose of IV Labetalol.  Blood pressure trending down, currently 151/79.  Will hold additional antihypertensive agents for now to allow for permissive hypertension.  -Troponin trending up 21->31, likely secondary to severe hypertension. She is without chest pain.  EKG reveals LBBB which is chronic, has been present since at least 2016.  -denies any chest pain  -troponin now trending down   -continue Tab Amlodipine 10 mg and increase Losartan to 100 mg  -monitor BP and adjust medications as appropriate       # Anxiety/depression  -Continue lexapro     # Hypothyroidism  -Continue levothyroxine.     # Suspected MONIQUE  - sleep study as outpatient    VTE Prophylaxis:  Pharmacologic & mechanical VTE prophylaxis orders are present.             Disposition Planning:     Barriers to Discharge:safe dispo   Anticipated Date of Discharge: 03/21  Place of Discharge: likely rehab       Time: 40 minutes     Code Status and Medical Interventions: CPR (Attempt to Resuscitate); Full Support   Ordered at: 03/17/25 0559     Code Status (Patient has no pulse and is not breathing):    CPR (Attempt to Resuscitate)     Medical  Interventions (Patient has pulse or is breathing):    Full Support       Signature: Electronically signed by Regino Ramos MD, 03/20/25, 14:22 EDT.  [facility]

## 2025-03-20 NOTE — THERAPY TREATMENT NOTE
"Subjective: Pt agreeable to therapeutic plan of care. Pt reporting she feels much more weak & fatigued this date & her head feels \"woozy\".   Cognition: oriented to Person, Place, Time, and Situation    Objective:     Precautions - fall risk, 2 person/sarita stedy lift transfer.     Bed Mobility: Mod-A  for sit>supine, mod A X2 for scooting up in bed. Roling to left = mod ind.    Functional Transfers: min - Mod-A and Assist x 2 for sit-stand. Recliner to w/c required sarita stedy lift for safety due to weakness     Balance: static sitting SBA; dynamic = CGA to min A. Pt leaning too far forward at times.   Functional Ambulation: N/A or Not attempted.    Hair Grooming: Independent  ADL Position: edge of bed sitting  ADL Comments:     Therapeutic Exercise - 10 Reps BUE.   AROM & A/AROM supported standing in sarita stedy lift     Vitals: Hypertensive  159/89    Pain: Pt c/o tailbone pain   7/10 Montoya-Wang   Interventions for pain: Repositioned, Increased Activity, and Therapeutic Presence. Pt positioned on left side in bed.   Education: Provided education on the importance of mobility in the acute care setting, Verbal/Tactile Cues, ADL training, Transfer Training, and HEP    Assessment: Lexi Gilbert presents with ADL impairments affecting function including balance, coordination, endurance / activity tolerance, grasp, motor control, postural / trunk control, range of motion (ROM), and strength. Pt feeling more weak this date requiring inc assistance for sit<>stand & has dec LUE elbow strength & ROM.  Pt participating well with therapy. Demonstrated functioning below baseline abilities indicate the need for continued skilled intervention while inpatient. Tolerating session today without incident. Will continue to follow and progress as tolerated.     Plan/Recommendations:   High Intensity Therapy recommended post-acute care. This is recommended as therapy feels the patient would require 5-6 days per week, 2-3 hours per day. At " this time, inpatient rehabilitation (acute rehab) would be the first choice and SNF would be second.. Pt requires no DME at discharge.     Pt desires Inpatient Rehabilitation placement at discharge. Pt cooperative; agreeable to therapeutic recommendations and plan of care.     Modified [place]: 4 = Moderately severe disability (Unable to attend to own bodily needs without assistance, and unable to walk unassisted)     Post-Tx Position: Alarms activated and Call light and personal items within reach, left side in bed   PPE: gloves       Time Calculation- OT       Row Name 03/20/25 1529             Time Calculation- OT    OT Start Time 1117  -DT      OT Stop Time 1145  -DT      OT Time Calculation (min) 28 min  -DT      Total Timed Code Minutes- OT 28 minute(s)  -DT      OT Received On 03/20/25  -DT      OT - Next Appointment 03/21/25  -DT                User Key  (r) = Recorded By, (t) = Taken By, (c) = Cosigned By      Initials Name Provider Type    Ai Norton, OT Occupational Therapist                    Additional Anesthesia Volume In Cc (Will Not Render If 0): 0 Depth Of Biopsy: dermis Render In Bullet Format When Appropriate: No Dressing: bandage Anesthesia Volume In Cc: 1.2 Wound Care: Petrolatum Billing Type: Third-Party Bill Curettage Text: The wound bed was treated with curettage after the biopsy was performed. Cryotherapy Text: The wound bed was treated with cryotherapy after the biopsy was performed. Notification Instructions: Patient will be notified of biopsy results. However, patient instructed to call the office if not contacted within 2 weeks. Post-Care Instructions: I reviewed with the patient in detail post-care instructions. Patient is to keep the biopsy site dry overnight, and then apply vaseline twice daily until healed. Biopsy Type: H and E Electrodesiccation And Curettage Text: The wound bed was treated with electrodesiccation and curettage after the biopsy was performed. Consent: Verbal consent was obtained and risks were reviewed including but not limited to scarring, infection, bleeding, scabbing, incomplete removal, nerve damage and allergy to anesthesia. Electrodesiccation Text: The wound bed was treated with electrodesiccation after the biopsy was performed. Silver Nitrate Text: The wound bed was treated with silver nitrate after the biopsy was performed. Detail Level: Detailed Biopsy Method: Personna blade Anesthesia Type: 1% lidocaine with epinephrine Hemostasis: Electrocautery Type Of Destruction Used: Curettage Information: Selecting Yes will display possible errors in your note based on the variables you have selected. This validation is only offered as a suggestion for you. PLEASE NOTE THAT THE VALIDATION TEXT WILL BE REMOVED WHEN YOU FINALIZE YOUR NOTE. IF YOU WANT TO FAX A PRELIMINARY NOTE YOU WILL NEED TO TOGGLE THIS TO 'NO' IF YOU DO NOT WANT IT IN YOUR FAXED NOTE. Was A Bandage Applied: Yes

## 2025-03-20 NOTE — THERAPY TREATMENT NOTE
"Subjective: Pt agreeable to therapeutic plan of care. Pt with subjective reports of increased weakness, fatigue, and feeling \"woozy\" this date.     Objective:     Precautions - desaturates with activity, falls    Bed mobility - Mod-A x2 for supine to sit and get positioned in the bed  Transfers - Min-ModA x2 with sarita stedy lift  Balance: static and dynamic standing: Marie x2  Ambulation - N/A or Not attempted.    Neuro re-ed: Attempted stepping forward and back with LLE, only able to complete x1 before pt feeling uneasy and requesting to sit down.      Vitals: O2- SpO2= 100% on 4L, decreased to 2L SpO2= 97%              BP- 159/89 seated, 143/62 seated, 135/80 laying    Pain: 6 Montoya-Baker Location: tailbone  Intervention for pain: Repositioned    Education: Provided education on the importance of mobility in the acute care setting    Assessment: Lexi Gilbert Lexi Gilbert 74 y.o. female with a CMH of hypothyroidism, anxiety/depression, hx of stroke ~15 years ago who presented on 3/17 with left-sided weakness. On arrival to the ED, her blood pressure was noted to be 220/120 troponin of 21 and glucose 118. tPA was not given on arrival. Per Neuro, pt with Chronic lacunar infarct in the posterior left putamen and Acute infarct within the lateral right thalamus likely 2/2 HTN. Up in chair upon arrival and reporting feeling more fatigued this date. Pt required Min-ModA x2 to come to stand from chair to complete neuro re-ed. Pt completed stepping with LLE forward and back x1 before needing to sit down due to fatigue. Pt reuired Min-ModA x2 for transfer chair to bed with sarita stedy. Required ModA x2 for sit to supine and to reposition in bed. Tolerating session today without incident. Will continue to follow and progress as tolerated.     Plan/Recommendations:   If medically appropriate, High Intensity Therapy recommended post-acute care. This is recommended as therapy feels the patient would require 5-6 days per week, 2-3 " hours per day. At this time, inpatient rehabilitation (acute rehab) would be the first choice and SNF would be second. Pt requires no DME at discharge.     Pt desires Inpatient Rehabilitation placement at discharge. Pt cooperative; agreeable to therapeutic recommendations and plan of care.     Modified [place]: 4 = Moderately severe disability (Unable to attend to own bodily needs without assistance, and unable to walk unassisted)     Post-Tx Position: Supine with HOB Elevated, Alarms activated, and Call light and personal items within reach  PPE: gloves and surgical mask    Therapy Charges for Today       Code Description Service Date Service Provider Modifiers Qty    48861838609  PT THERAPEUTIC ACT EA 15 MIN 3/19/2025 Tabatha Gunn, PT Student GP 1    [id number]  PT THER PROC EA 15 MIN 3/19/2025 Tabatha Gunn, PT Student GP 1    [id number]  PT THERAPEUTIC ACT EA 15 MIN 3/20/2025 Tabatha Gunn, PT Student GP 1    [id number]  PT NEUROMUSC RE EDUCATION EA 15 MIN 3/20/2025 Tabatha Gunn, PT Student GP 1    [id number]  PT SELF CARE/MGMT/TRAIN EA 15 MIN 3/20/2025 Tabatha Gunn, PT Student GP 1           PT Charges       Row Name 03/20/25 1634             Time Calculation    Start Time 1118  -SS (r) AD (t) SS (c)      Stop Time 1145  -SS (r) AD (t) SS (c)      Time Calculation (min) 27 min  -SS (r) AD (t)      PT Received On 03/20/25  -SS (r) AD (t) SS (c)      PT - Next Appointment 03/21/25  -SS (r) AD (t) SS (c)         Time Calculation- PT    Total Timed Code Minutes- PT 27 minute(s)  -SS (r) AD (t) SS (c)                User Key  (r) = Recorded By, (t) = Taken By, (c) = Cosigned By      Initials Name Provider Type    Michelle Molina, PT Physical Therapist    Tabatha Yanez, PT Student PT Student

## 2025-03-20 NOTE — PLAN OF CARE
Goal Outcome Evaluation:  Plan of Care Reviewed With: patient    Assessment: Lexi Gilebrt 74 y.o. female with a CMH of hypothyroidism, anxiety/depression, hx of stroke ~15 years ago who presented on 3/17 with left-sided weakness. On arrival to the ED, her blood pressure was noted to be 220/120 troponin of 21 and glucose 118. tPA was not given on arrival. Per Neuro, pt with Chronic lacunar infarct in the posterior left putamen and Acute infarct within the lateral right thalamus likely 2/2 HTN. Up in chair upon arrival and reporting feeling more fatigued this date. Pt required Min-ModA x2 to come to stand from chair to complete neuro re-ed. Pt completed stepping with LLE forward and back x1 before needing to sit down due to fatigue. Pt reuired Min-ModA x2 for transfer chair to bed with sarita seals. Required ModA x2 for sit to supine and to reposition in bed. Tolerating session today without incident. Will continue to follow and progress as tolerated.     Anticipated Discharge Disposition (PT): inpatient rehabilitation facility

## 2025-03-21 PROCEDURE — 25010000002 DIPHENHYDRAMINE PER 50 MG: Performed by: STUDENT IN AN ORGANIZED HEALTH CARE EDUCATION/TRAINING PROGRAM

## 2025-03-21 PROCEDURE — 97530 THERAPEUTIC ACTIVITIES: CPT

## 2025-03-21 PROCEDURE — 97112 NEUROMUSCULAR REEDUCATION: CPT

## 2025-03-21 PROCEDURE — 25010000002 HYDRALAZINE PER 20 MG: Performed by: STUDENT IN AN ORGANIZED HEALTH CARE EDUCATION/TRAINING PROGRAM

## 2025-03-21 RX ORDER — DIPHENHYDRAMINE HYDROCHLORIDE 50 MG/ML
25 INJECTION, SOLUTION INTRAMUSCULAR; INTRAVENOUS ONCE
Status: COMPLETED | OUTPATIENT
Start: 2025-03-21 | End: 2025-03-21

## 2025-03-21 RX ORDER — DIPHENHYDRAMINE HYDROCHLORIDE, ZINC ACETATE 2; .1 G/100G; G/100G
1 CREAM TOPICAL 3 TIMES DAILY PRN
Status: DISCONTINUED | OUTPATIENT
Start: 2025-03-21 | End: 2025-03-26 | Stop reason: HOSPADM

## 2025-03-21 RX ORDER — HYDRALAZINE HYDROCHLORIDE 25 MG/1
25 TABLET, FILM COATED ORAL EVERY 8 HOURS SCHEDULED
Status: DISCONTINUED | OUTPATIENT
Start: 2025-03-21 | End: 2025-03-26 | Stop reason: HOSPADM

## 2025-03-21 RX ORDER — HYDRALAZINE HYDROCHLORIDE 20 MG/ML
10 INJECTION INTRAMUSCULAR; INTRAVENOUS ONCE
Status: COMPLETED | OUTPATIENT
Start: 2025-03-21 | End: 2025-03-21

## 2025-03-21 RX ADMIN — ASPIRIN 81 MG: 81 TABLET, COATED ORAL at 09:28

## 2025-03-21 RX ADMIN — LEVOTHYROXINE SODIUM 175 MCG: 175 TABLET ORAL at 06:38

## 2025-03-21 RX ADMIN — OXYCODONE 5 MG: 5 TABLET ORAL at 03:34

## 2025-03-21 RX ADMIN — LOSARTAN POTASSIUM 100 MG: 50 TABLET, FILM COATED ORAL at 09:28

## 2025-03-21 RX ADMIN — DIPHENHYDRAMINE HYDROCHLORIDE 25 MG: 50 INJECTION, SOLUTION INTRAMUSCULAR; INTRAVENOUS at 02:37

## 2025-03-21 RX ADMIN — Medication 5 MG: at 03:34

## 2025-03-21 RX ADMIN — Medication 10 ML: at 21:17

## 2025-03-21 RX ADMIN — HYDRALAZINE HYDROCHLORIDE 25 MG: 25 TABLET ORAL at 21:17

## 2025-03-21 RX ADMIN — AMLODIPINE BESYLATE 10 MG: 5 TABLET ORAL at 09:28

## 2025-03-21 RX ADMIN — ESCITALOPRAM 20 MG: 10 TABLET, FILM COATED ORAL at 09:28

## 2025-03-21 RX ADMIN — Medication 10 ML: at 09:28

## 2025-03-21 RX ADMIN — HYDRALAZINE HYDROCHLORIDE 10 MG: 20 INJECTION INTRAMUSCULAR; INTRAVENOUS at 18:25

## 2025-03-21 RX ADMIN — CLOPIDOGREL BISULFATE 75 MG: 75 TABLET ORAL at 09:28

## 2025-03-21 RX ADMIN — OXYCODONE 5 MG: 5 TABLET ORAL at 23:44

## 2025-03-21 NOTE — THERAPY TREATMENT NOTE
Subjective: Pt agreeable to therapeutic plan of care.    Objective:     Precautions - High fall risk    Bed mobility - Mod-A and verbal cues for sequencing  Transfers - Min-A with use of Sarita Escobedo  Ambulation - transferred to chair with sarita escobedo    Pt able to maintain sitting balance EOB with supervision.  Pt has tendency to lean to L and when adjusting position in chair, pushes to RUE further shifting to L.  Pt was educated in postural alignment and verbalizes as well as demonstrates understanding.  She is able to reposition into midline and PT provided positioning assist for LUE and L hip.        Vitals: WNL    Pain: 3 VAS Location: generalized  Intervention for pain: Repositioned and Increased Activity    Education: Provided education on the importance of mobility in the acute care setting and Transfer Training; postural alignment.  Neuroplasticity.     Assessment: Lexi Gilbert presents with functional mobility impairments which indicate the need for skilled intervention. Tolerating session today without incident. Pt is progressing with bed mobility and transfers.  She exhibits postural deficits but is able to correct alignment with verbal cues. Will continue to follow and progress as tolerated. She would benefit from IRF for continued PT at discharge.     Plan/Recommendations:   If medically appropriate, High Intensity Therapy recommended post-acute care. This is recommended as therapy feels the patient would require 5-6 days per week, 2-3 hours per day. At this time, inpatient rehabilitation (acute rehab) would be the first choice and SNF would be second. Pt requires no DME at discharge.     Pt desires Inpatient Rehabilitation placement at discharge. Pt cooperative; agreeable to therapeutic recommendations and plan of care.         Basic Mobility 6-click:  Rollin = Total, A lot = 2, A little = 3; 4 = None  Supine>Sit:   1 = Total, A lot = 2, A little = 3; 4 = None   Sit>Stand with arms:  1 =  Total, A lot = 2, A little = 3; 4 = None  Bed>Chair:   1 = Total, A lot = 2, A little = 3; 4 = None  Ambulate in room:  1 = Total, A lot = 2, A little = 3; 4 = None  3-5 Steps with railin = Total, A lot = 2, A little = 3; 4 = None  Score: 12    Modified [place]: 4 = Moderately severe disability (Unable to attend to own bodily needs without assistance, and unable to walk unassisted)     Post-Tx Position: Up in Chair, Alarms activated, and Call light and personal items within reach  PPE: gloves    Therapy Charges for Today       Code Description Service Date Service Provider Modifiers Qty    01548432284 HC PT THERAPEUTIC ACT EA 15 MIN 3/21/2025 Ledy Yousif, PT GP 1    [id number]  PT NEUROMUSC RE EDUCATION EA 15 MIN 3/21/2025 Ledy Yousif, PT GP 1           PT Charges       Row Name 25 1538             Time Calculation    Start Time 1415  -CL      Stop Time 1446  -CL      Time Calculation (min) 31 min  -CL      PT Received On 25  -CL      PT - Next Appointment 25  -CL      PT Goal Re-Cert Due Date 25  -CL         Time Calculation- PT    Total Timed Code Minutes- PT 31 minute(s)  -CL                User Key  (r) = Recorded By, (t) = Taken By, (c) = Cosigned By      Initials Name Provider Type    CL Ledy Yousif, PT Physical Therapist

## 2025-03-21 NOTE — PLAN OF CARE
Goal Outcome Evaluation:          Lexi Gilbert presents with functional mobility impairments which indicate the need for skilled intervention. Tolerating session today without incident. Pt is progressing with bed mobility and transfers.  She exhibits postural deficits but is able to correct alignment with verbal cues. Will continue to follow and progress as tolerated. She would benefit from IRF for continued PT at discharge.

## 2025-03-21 NOTE — THERAPY TREATMENT NOTE
Subjective: Pt agreeable to therapeutic plan of care.  Cognition: oriented to Person, Place, Time, and Situation, arousal/alertness: Alert and Attentive, safety/judgement: fair, and awareness of deficits: fair awareness of safety precautions and fair awareness of deficits    Objective:     Precautions - LUE Hemiparesis, falls, desaturates with activity    Bed Mobility: Max-A and Assist x 2   Functional Transfers: with adaptive equipment - utilized Sarita Steady transfer device to move pt from recliner to EOB. Pt required min A for CTS to sarita steady. Without transfer device pt would be max Ax2 transfer and would be at high risk of falls as LLE shmuel unexpectedly     Balance: supported, with UE support, and standing Min-A, Mod-A, and with rolling walker  Functional Ambulation: N/A or Not attempted.    Pt up in recliner at start of session indicating discomfort in buttocks due to prolonged sitting and agreeable to standing. Mod A to scoot forward in chair and increased time due to needed rest breaks for O2 desat to 80s. Recovered to 90s with PLB and rest on 2L, titrated up from 1L as pt was struggling to recover following activity on 1L only. Once seated at edge of chair, pt was able to complete standing for 3 reps of 15-30 seconds with min/mod A for LUE support and finding midline position as pt leans to the L, especially with increased repetitions and prolonged standing. Following standing, pt engaged in neuromuscular re-education of LUE including education on joint compression and weight bearing strategies. Completed 3 repetitions of 15 seconds of weight shift to L side while seated with OT supporting LUE at arm of chair. Educated pt on methods for engaging in weight bearing while in recliner or bed, sitting up or supine. By end of session pt was fatigued and unable to get comfortable in chair. Requested to transfer back to bed with sarita steady device. Min/mod A to stand to device and min A/cues for midline posture  due to leaning to L. Max Ax2 for bed mobility to scoot up in bed.     Vitals: Desaturates    Pain: 5 VAS Location: buttocks  Interventions for pain: Repositioned, Increased Activity, and Therapeutic Presence  Education: Provided education on the importance of mobility in the acute care setting, Verbal/Tactile Cues, Transfer Training, Stroke prevention and risk factors, and HEP    Assessment: Lexi Gilbert presents with ADL impairments affecting function including balance, cognition, coordination, endurance / activity tolerance, motor control, motor planning, muscle tone abnormal, pain, postural / trunk control, shortness of breath, and strength. Demonstrated functioning below baseline abilities indicate the need for continued skilled intervention while inpatient. Pt engaged in trials of standing and attempting weight shift while standing and seated weight bearing in chair. Requiring min/mod A to stand and min/mod A to weight shift and correct posture to midline due to L lean. Tolerating session today without incident. Will continue to follow and progress as tolerated.     Plan/Recommendations:   High Intensity Therapy recommended post-acute care. This is recommended as therapy feels the patient would require 5-6 days per week, 2-3 hours per day. At this time, inpatient rehabilitation (acute rehab) would be the first choice and SNF would be second..     Pt desires Inpatient Rehabilitation placement at discharge. Pt cooperative; agreeable to therapeutic recommendations and plan of care.     Modified [place]: 4 = Moderately severe disability (Unable to attend to own bodily needs without assistance, and unable to walk unassisted)     Post-Tx Position: Supine with HOB Elevated, Alarms activated, and Call light and personal items within reach  PPE: gloves and surgical mask    Therapy Charges for Today       Code Description Service Date Service Provider Modifiers Qty    22639403736 HC OT NEUROMUSC RE EDUCATION EA 15 MIN  3/21/2025 Rosibel Cedillo OT GO 2           Time Calculation- OT       Row Name 03/21/25 1606             Time Calculation- OT    OT Start Time 1527  -KT      OT Stop Time 1556  -KT      OT Time Calculation (min) 29 min  -KT      Total Timed Code Minutes- OT 29 minute(s)  -KT      OT Received On 03/21/25  -KT      OT - Next Appointment 03/24/25  -KT                User Key  (r) = Recorded By, (t) = Taken By, (c) = Cosigned By      Initials Name Provider Type    KT Rosibel Cedillo OT Occupational Therapist

## 2025-03-21 NOTE — PLAN OF CARE
Goal Outcome Evaluation:      Assessment: Lexi Gilbert presents with ADL impairments affecting function including balance, cognition, coordination, endurance / activity tolerance, motor control, motor planning, muscle tone abnormal, pain, postural / trunk control, shortness of breath, and strength. Demonstrated functioning below baseline abilities indicate the need for continued skilled intervention while inpatient. Pt engaged in trials of standing and attempting weight shift while standing and seated weight bearing in chair. Requiring min/mod A to stand and min/mod A to weight shift and correct posture to midline due to L lean. Tolerating session today without incident. Will continue to follow and progress as tolerated.     Plan/Recommendations:   High Intensity Therapy recommended post-acute care. This is recommended as therapy feels the patient would require 5-6 days per week, 2-3 hours per day. At this time, inpatient rehabilitation (acute rehab) would be the first choice and SNF would be second..     Pt desires Inpatient Rehabilitation placement at discharge. Pt cooperative; agreeable to therapeutic recommendations and plan of care.

## 2025-03-21 NOTE — PROGRESS NOTES
[facility] MEDICINE SERVICE  DAILY PROGRESS NOTE    NAME: Lexi Gilbert  : 1950  MRN: [id number]      LOS: 3 days     PROVIDER OF SERVICE: Regino Ramos MD    Chief Complaint: Accelerated hypertension    Subjective:     Interval History:  History taken from: Patient and patient's chart     Denies any new complaints.         Review of Systems:   Review of Systems  All negative except above   Objective:     Vital Signs  Temp:  [97.3 °F (36.3 °C)-98 °F (36.7 °C)] 97.3 °F (36.3 °C)  Heart Rate:  [57-71] 62  Resp:  [17-22] 22  BP: (129-168)/(70-90) 168/89  Flow (L/min) (Oxygen Therapy):  [1-2] 1   Body mass index is 44.56 kg/m².    Physical Exam  Physical Exam  General: Alert and oriented, no acute distress.  Lungs: Clear to auscultation, nonlabored respiration.  Heart: RRR, no murmur, gallop or edema.  Abdomen: Soft, nontender, nondistended, + bowel sounds.  Neuro: alert and awake, left sided weakness        Diagnostic Data    Results from last 7 days   Lab Units 25  0529 25  0615 25  0354   WBC 10*3/mm3 7.29   < > 8.83   HEMOGLOBIN g/dL 13.1   < > 15.0   HEMATOCRIT % 42.0   < > 47.7*   PLATELETS 10*3/mm3 236   < > 253   GLUCOSE mg/dL 100*   < > 118*   CREATININE mg/dL 0.63   < > 0.67   BUN mg/dL 19   < > 19   SODIUM mmol/L 142   < > 142   POTASSIUM mmol/L 4.2   < > 4.3   AST (SGOT) U/L  --   --  19   ALT (SGPT) U/L  --   --  11   ALK PHOS U/L  --   --  81   BILIRUBIN mg/dL  --   --  0.3   ANION GAP mmol/L 12.6   < > 11.5    < > = values in this interval not displayed.       No radiology results for the last day        I have reviewed patient labs and imaging     Assessment/Plan:     Active and Resolved Problems  # chronic lacunar infarct in the posterior left putamen   # Acute infarct within the lateral right thalamus likely 2/2 HTN  -Patient presented with left arm and left leg weakness since around 4am. She has a hx of stroke and states her symptoms are similar to her  prior stroke. tPA was not given on arrival.   -Reviewed CTH - chronic small vessel ischemic disease and chronic lacunar infarct. No acute findings.   -Will obtain stat CTA head and carotids  -MRI ordered and pending at this time  -NIHSS BID and neuro-checks Q4H  -Start aspirin  -OT/PT consult  -ECHO EF 60%, GIDD  -Telemetry  -Consult neurology -- Plavix load 300 mg once PO +  mg once PO/CO, then ASA 81 mg daily PO and Plavix 75 mg daily PO x21 days then transition to ASA 81 mg daily PO monotherapy    -continue statin   - MCOT at discharge      # Hypertensive emergency  -Initial blood pressure 220/120.  She was given one dose of IV Labetalol.  Blood pressure trending down, currently 151/79.  Will hold additional antihypertensive agents for now to allow for permissive hypertension.  -Troponin trending up 21->31, likely secondary to severe hypertension. She is without chest pain.  EKG reveals LBBB which is chronic, has been present since at least 2016.  -denies any chest pain  -troponin now trending down   -continue Tab Amlodipine 10 mg and increase Losartan to 100 mg  -monitor BP and adjust medications as appropriate       # Anxiety/depression  -Continue lexapro     # Hypothyroidism  -Continue levothyroxine.     # Suspected MONIQUE  - sleep study as outpatient    VTE Prophylaxis:  Pharmacologic & mechanical VTE prophylaxis orders are present.             Disposition Planning:     Barriers to Discharge:safe dispo   Anticipated Date of Discharge: 03/22  Place of Discharge: likely rehab       Time: 40 minutes     Code Status and Medical Interventions: CPR (Attempt to Resuscitate); Full Support   Ordered at: 03/17/25 0559     Code Status (Patient has no pulse and is not breathing):    CPR (Attempt to Resuscitate)     Medical Interventions (Patient has pulse or is breathing):    Full Support       Signature: Electronically signed by Regino Ramos MD, 03/21/25, 12:28 EDT.  [facility] Hospitalist Team

## 2025-03-21 NOTE — PLAN OF CARE
Problem: Adult Inpatient Plan of Care  Goal: Plan of Care Review  Outcome: Progressing  Goal: Patient-Specific Goal (Individualized)  Outcome: Progressing  Goal: Absence of Hospital-Acquired Illness or Injury  Outcome: Progressing  Intervention: Identify and Manage Fall Risk  Recent Flowsheet Documentation  Taken 3/21/2025 0404 by Sierra Hernandez RN  Safety Promotion/Fall Prevention:   assistive device/personal items within reach   clutter free environment maintained   fall prevention program maintained   lighting adjusted   mobility aid in reach   nonskid shoes/slippers when out of bed   room organization consistent   safety round/check completed  Taken 3/21/2025 0100 by Sierra Hernandez RN  Safety Promotion/Fall Prevention:   assistive device/personal items within reach   clutter free environment maintained   fall prevention program maintained   lighting adjusted   mobility aid in reach   nonskid shoes/slippers when out of bed   room organization consistent   safety round/check completed  Taken 3/21/2025 0020 by Sierra Hernandez RN  Safety Promotion/Fall Prevention:   assistive device/personal items within reach   clutter free environment maintained   fall prevention program maintained   lighting adjusted   mobility aid in reach   nonskid shoes/slippers when out of bed   room organization consistent   safety round/check completed  Taken 3/20/2025 2200 by Sierra Hernandez, RN  Safety Promotion/Fall Prevention:   assistive device/personal items within reach   clutter free environment maintained   fall prevention program maintained   lighting adjusted   mobility aid in reach   nonskid shoes/slippers when out of bed   room organization consistent   safety round/check completed  Taken 3/20/2025 2119 by Sierra Hernandez, RN  Safety Promotion/Fall Prevention:   assistive device/personal items within reach   clutter free environment maintained   fall prevention program maintained   lighting adjusted   mobility aid in reach    nonskid shoes/slippers when out of bed   room organization consistent   safety round/check completed  Intervention: Prevent Skin Injury  Recent Flowsheet Documentation  Taken 3/21/2025 0404 by Sierra Hernandez RN  Skin Protection: transparent dressing maintained  Taken 3/21/2025 0020 by Sierra Hernandez RN  Skin Protection: transparent dressing maintained  Taken 3/20/2025 2119 by Sierra Hernandez RN  Body Position:   position changed independently   weight shifting  Intervention: Prevent Infection  Recent Flowsheet Documentation  Taken 3/21/2025 0404 by Sierra Hernandez RN  Infection Prevention:   equipment surfaces disinfected   hand hygiene promoted   personal protective equipment utilized   rest/sleep promoted   single patient room provided  Taken 3/21/2025 0100 by Sierra Hernandez RN  Infection Prevention:   equipment surfaces disinfected   hand hygiene promoted   personal protective equipment utilized   rest/sleep promoted   single patient room provided  Taken 3/21/2025 0020 by Sierra Hernandez RN  Infection Prevention:   equipment surfaces disinfected   hand hygiene promoted   personal protective equipment utilized   rest/sleep promoted   single patient room provided  Taken 3/20/2025 2200 by Sierra Hernandez RN  Infection Prevention:   equipment surfaces disinfected   hand hygiene promoted   personal protective equipment utilized   rest/sleep promoted   single patient room provided  Taken 3/20/2025 2119 by Sierra Hernandez RN  Infection Prevention:   equipment surfaces disinfected   hand hygiene promoted   personal protective equipment utilized   rest/sleep promoted   single patient room provided  Goal: Optimal Comfort and Wellbeing  Outcome: Progressing  Intervention: Monitor Pain and Promote Comfort  Recent Flowsheet Documentation  Taken 3/21/2025 0334 by Sierra Hernandez RN  Pain Management Interventions:   pain medication given   heat applied   position adjusted  Intervention: Provide Person-Centered Care  Recent Flowsheet  Documentation  Taken 3/20/2025 2119 by Sierra Hernandez RN  Trust Relationship/Rapport:   care explained   choices provided   thoughts/feelings acknowledged  Goal: Readiness for Transition of Care  Outcome: Progressing     Problem: Skin Injury Risk Increased  Goal: Skin Health and Integrity  Outcome: Progressing  Intervention: Optimize Skin Protection  Recent Flowsheet Documentation  Taken 3/21/2025 0404 by Sierra Hernandez RN  Pressure Reduction Techniques: frequent weight shift encouraged  Pressure Reduction Devices: pressure-redistributing mattress utilized  Skin Protection: transparent dressing maintained  Taken 3/21/2025 0020 by Sierra Hernandez RN  Pressure Reduction Techniques: frequent weight shift encouraged  Pressure Reduction Devices: pressure-redistributing mattress utilized  Skin Protection: transparent dressing maintained  Taken 3/20/2025 2119 by Sierra Hernandez RN  Activity Management: activity encouraged  Pressure Reduction Techniques:   frequent weight shift encouraged   weight shift assistance provided  Head of Bed (HOB) Positioning: HOB elevated  Pressure Reduction Devices: pressure-redistributing mattress utilized  Intervention: Promote and Optimize Oral Intake  Recent Flowsheet Documentation  Taken 3/20/2025 2119 by Sierra Hernandez RN  Oral Nutrition Promotion: rest periods promoted     Problem: Stroke, Ischemic (Includes Transient Ischemic Attack)  Goal: Optimal Coping  Outcome: Progressing  Intervention: Support Psychosocial Response to Stroke  Recent Flowsheet Documentation  Taken 3/20/2025 2119 by Sierra Hernandez RN  Supportive Measures:   active listening utilized   verbalization of feelings encouraged  Goal: Effective Bowel Elimination  Outcome: Progressing  Goal: Optimal Cerebral Tissue Perfusion  Outcome: Progressing  Intervention: Protect and Optimize Cerebral Perfusion  Recent Flowsheet Documentation  Taken 3/20/2025 2119 by Sierra Hernandez RN  Fluid/Electrolyte Management: fluids  provided  Goal: Optimal Cognitive Function  Outcome: Progressing  Intervention: Optimize Cognitive Function  Recent Flowsheet Documentation  Taken 3/21/2025 0020 by Sierra Hernandez RN  Reorientation Measures:   calendar in view   clock in view  Taken 3/20/2025 2119 by Sierra Hernandez RN  Reorientation Measures:   clock in view   calendar in view  Goal: Improved Communication Skills  Outcome: Progressing  Intervention: Optimize Communication Skills  Recent Flowsheet Documentation  Taken 3/21/2025 0404 by Sierra Hernandez RN  Communication Enhancement Strategies: call light answered in person  Taken 3/21/2025 0020 by Sierra Hernandez RN  Communication Enhancement Strategies: call light answered in person  Taken 3/20/2025 2119 by Sierra Hernandez RN  Communication Enhancement Strategies: call light answered in person  Goal: Optimal Functional Ability  Outcome: Progressing  Intervention: Optimize Functional Ability  Recent Flowsheet Documentation  Taken 3/20/2025 2119 by Sierra Hernandez RN  Activity Management: activity encouraged  Goal: Optimal Nutrition Intake  Outcome: Progressing  Intervention: Promote and Optimize Fluid and Food Intake  Recent Flowsheet Documentation  Taken 3/20/2025 2119 by Sierra Hernandez RN  Oral Nutrition Promotion: rest periods promoted  Goal: Effective Oxygenation and Ventilation  Outcome: Progressing  Intervention: Optimize Oxygenation and Ventilation  Recent Flowsheet Documentation  Taken 3/20/2025 2119 by Sierra Hernandez RN  Head of Bed (HOB) Positioning: HOB elevated  Goal: Improved Sensorimotor Function  Outcome: Progressing  Intervention: Optimize Range of Motion, Motor Control and Function  Recent Flowsheet Documentation  Taken 3/21/2025 0404 by Sierra Hernandez RN  Range of Motion: active ROM (range of motion) encouraged  Taken 3/20/2025 2119 by Sierra Hernandez RN  Range of Motion: active ROM (range of motion) encouraged  Intervention: Optimize Sensory and Perceptual Ability  Recent Flowsheet  Documentation  Taken 3/21/2025 0404 by Sierra Hernandez RN  Pressure Reduction Techniques: frequent weight shift encouraged  Pressure Reduction Devices: pressure-redistributing mattress utilized  Taken 3/21/2025 0020 by Sierra Hernandez RN  Pressure Reduction Techniques: frequent weight shift encouraged  Pressure Reduction Devices: pressure-redistributing mattress utilized  Taken 3/20/2025 2119 by Sierra Hernandez RN  Pressure Reduction Techniques:   frequent weight shift encouraged   weight shift assistance provided  Pressure Reduction Devices: pressure-redistributing mattress utilized  Goal: Safe and Effective Swallow  Outcome: Progressing  Goal: Effective Urinary Elimination  Outcome: Progressing   Goal Outcome Evaluation:

## 2025-03-21 NOTE — PLAN OF CARE
Problem: Adult Inpatient Plan of Care  Goal: Plan of Care Review  Outcome: Progressing  Goal: Patient-Specific Goal (Individualized)  Outcome: Progressing  Goal: Absence of Hospital-Acquired Illness or Injury  Outcome: Progressing  Intervention: Identify and Manage Fall Risk  Recent Flowsheet Documentation  Taken 3/21/2025 1600 by Ro Salmon RN  Safety Promotion/Fall Prevention:   assistive device/personal items within reach   clutter free environment maintained   fall prevention program maintained   nonskid shoes/slippers when out of bed   room organization consistent   safety round/check completed  Taken 3/21/2025 1400 by Ro Salmon RN  Safety Promotion/Fall Prevention: safety round/check completed  Taken 3/21/2025 1200 by Ro Salmon RN  Safety Promotion/Fall Prevention:   assistive device/personal items within reach   clutter free environment maintained   fall prevention program maintained   nonskid shoes/slippers when out of bed   room organization consistent   safety round/check completed  Taken 3/21/2025 1000 by Ro Salmon RN  Safety Promotion/Fall Prevention: safety round/check completed  Taken 3/21/2025 0800 by Ro Salmon RN  Safety Promotion/Fall Prevention:   assistive device/personal items within reach   clutter free environment maintained   fall prevention program maintained   nonskid shoes/slippers when out of bed   room organization consistent   safety round/check completed  Intervention: Prevent Skin Injury  Recent Flowsheet Documentation  Taken 3/21/2025 1600 by Ro Salmon RN  Body Position: weight shifting  Skin Protection: transparent dressing maintained  Taken 3/21/2025 1200 by Ro Salmon RN  Body Position: position changed independently  Taken 3/21/2025 0800 by Ro Salmon RN  Body Position: position changed independently  Skin Protection: transparent dressing maintained  Intervention: Prevent Infection  Recent Flowsheet  Documentation  Taken 3/21/2025 1600 by Ro Salmon RN  Infection Prevention: hand hygiene promoted  Taken 3/21/2025 1200 by Ro Salmon RN  Infection Prevention: hand hygiene promoted  Taken 3/21/2025 0800 by Ro Salmon RN  Infection Prevention: hand hygiene promoted  Goal: Optimal Comfort and Wellbeing  Outcome: Progressing  Goal: Readiness for Transition of Care  Outcome: Progressing   Goal Outcome Evaluation:         Patient does not have any new tests or procedures. The patient is on 1L of oxygen and did not have any complaints of pain. The patient is waiting on precert for rehab. Will continue to monitor.

## 2025-03-22 RX ORDER — CHLORTHALIDONE 25 MG/1
25 TABLET ORAL DAILY
Status: DISCONTINUED | OUTPATIENT
Start: 2025-03-22 | End: 2025-03-26 | Stop reason: HOSPADM

## 2025-03-22 RX ORDER — METHOCARBAMOL 750 MG/1
750 TABLET, FILM COATED ORAL ONCE
Status: COMPLETED | OUTPATIENT
Start: 2025-03-22 | End: 2025-03-22

## 2025-03-22 RX ADMIN — AMLODIPINE BESYLATE 10 MG: 5 TABLET ORAL at 08:14

## 2025-03-22 RX ADMIN — ACETAMINOPHEN 650 MG: 325 TABLET, FILM COATED ORAL at 10:31

## 2025-03-22 RX ADMIN — ASPIRIN 81 MG: 81 TABLET, COATED ORAL at 08:14

## 2025-03-22 RX ADMIN — Medication 10 ML: at 08:14

## 2025-03-22 RX ADMIN — Medication 10 ML: at 21:25

## 2025-03-22 RX ADMIN — CHLORTHALIDONE 25 MG: 25 TABLET ORAL at 12:51

## 2025-03-22 RX ADMIN — HYDRALAZINE HYDROCHLORIDE 25 MG: 25 TABLET ORAL at 21:25

## 2025-03-22 RX ADMIN — LEVOTHYROXINE SODIUM 175 MCG: 175 TABLET ORAL at 06:41

## 2025-03-22 RX ADMIN — METHOCARBAMOL TABLETS 750 MG: 750 TABLET, COATED ORAL at 02:55

## 2025-03-22 RX ADMIN — HYDRALAZINE HYDROCHLORIDE 25 MG: 25 TABLET ORAL at 06:41

## 2025-03-22 RX ADMIN — HYDRALAZINE HYDROCHLORIDE 25 MG: 25 TABLET ORAL at 16:23

## 2025-03-22 RX ADMIN — OXYCODONE 5 MG: 5 TABLET ORAL at 06:41

## 2025-03-22 RX ADMIN — ACETAMINOPHEN 650 MG: 325 TABLET, FILM COATED ORAL at 16:23

## 2025-03-22 RX ADMIN — CLOPIDOGREL BISULFATE 75 MG: 75 TABLET ORAL at 08:14

## 2025-03-22 RX ADMIN — LOSARTAN POTASSIUM 100 MG: 50 TABLET, FILM COATED ORAL at 08:14

## 2025-03-22 RX ADMIN — OXYCODONE 5 MG: 5 TABLET ORAL at 12:51

## 2025-03-22 RX ADMIN — ESCITALOPRAM 20 MG: 10 TABLET, FILM COATED ORAL at 08:14

## 2025-03-22 NOTE — PROGRESS NOTES
[facility] MEDICINE SERVICE  DAILY PROGRESS NOTE    NAME: Lexi Gilbert  : 1950  MRN: [id number]      LOS: 4 days     PROVIDER OF SERVICE: Kartik Banegas MD    Chief Complaint: Accelerated hypertension    Subjective:     Interval History:  History taken from: patient    Doing well, no active complaints.         Review of Systems:   As above    Objective:     Vital Signs  Temp:  [97.6 °F (36.4 °C)-98.1 °F (36.7 °C)] 97.6 °F (36.4 °C)  Heart Rate:  [62-69] 62  Resp:  [21-26] 22  BP: (142-173)/(67-97) 168/96  Flow (L/min) (Oxygen Therapy):  [1] 1   Body mass index is 44.56 kg/m².    Physical Exam  AOx3 NAD  RRR S1-S2 audible  Lung CTA  Abdomen soft nontender      Diagnostic Data    Results from last 7 days   Lab Units 25  0529 25  0615 25  0354   WBC 10*3/mm3 7.29   < > 8.83   HEMOGLOBIN g/dL 13.1   < > 15.0   HEMATOCRIT % 42.0   < > 47.7*   PLATELETS 10*3/mm3 236   < > 253   GLUCOSE mg/dL 100*   < > 118*   CREATININE mg/dL 0.63   < > 0.67   BUN mg/dL 19   < > 19   SODIUM mmol/L 142   < > 142   POTASSIUM mmol/L 4.2   < > 4.3   AST (SGOT) U/L  --   --  19   ALT (SGPT) U/L  --   --  11   ALK PHOS U/L  --   --  81   BILIRUBIN mg/dL  --   --  0.3   ANION GAP mmol/L 12.6   < > 11.5    < > = values in this interval not displayed.       No radiology results for the last day      I reviewed the patient's new clinical results.    Assessment/Plan:     Active and Resolved Problems  Active Hospital Problems    Diagnosis  POA    **Accelerated hypertension [I10]  Yes      Resolved Hospital Problems   No resolved problems to display.     # chronic lacunar infarct in the posterior left putamen   # Acute infarct within the lateral right thalamus likely 2/2 HTN  -Patient presented with left arm and left leg weakness since around 4am. She has a hx of stroke and states her symptoms are similar to her prior stroke. tPA was not given on arrival.   -Reviewed CTH - chronic small vessel ischemic disease  and chronic lacunar infarct. No acute findings.   -CTA head and carotids: no LVO. Focal high-grade stenosis is suggested within the distal right vertebral artery at the level of the skull base just proximal to the basilar artery.   Mild to moderate short segment stenosis is suggested within a right M2 insular branch.   -MRI showed Small punctate area of acute ischemia at junction of lateral right thalamus and posterior limb of right internal capsule.   -NIHSS BID and neuro-checks Q4H  -Started aspirin  -OT/PT consult, recommend acute rehab   -ECHO EF 60%, GIDD  -Telemetry  -Consult neurology -- Plavix load 300 mg once PO +  mg once PO/CO, then ASA 81 mg daily PO and Plavix 75 mg daily PO x21 days then transition to ASA 81 mg daily PO monotherapy    -continue statin   - MCOT at discharge      # Hypertensive emergency  -Initial blood pressure 220/120.  She was given one dose of IV Labetalol.  Blood pressure trending down, currently 151/79.  Will hold additional antihypertensive agents for now to allow for permissive hypertension.  -Troponin trending up 21->31, likely secondary to severe hypertension. She is without chest pain.  EKG reveals LBBB which is chronic, has been present since at least 2016.  -denies any chest pain  -troponin now trending down   -continue Tab Amlodipine 10 mg and increased dose of Losartan  100 mg. Add chlorthalidone 25mg daily   -monitor BP and adjust medications as appropriate        # Anxiety/depression  -Continue lexapro     # Hypothyroidism  -Continue levothyroxine.     # Suspected MONIQUE  - sleep study as outpatient    VTE Prophylaxis:  Pharmacologic & mechanical VTE prophylaxis orders are present.             Disposition Planning:     Barriers to Discharge:awaiting bed at [facility]   Anticipated Date of Discharge: 3/23/25  Place of Discharge: SNF      Time: 35 minutes     Code Status and Medical Interventions: CPR (Attempt to Resuscitate); Full Support   Ordered at: 03/17/25 0559     Code  Status (Patient has no pulse and is not breathing):    CPR (Attempt to Resuscitate)     Medical Interventions (Patient has pulse or is breathing):    Full Support       Signature: Electronically signed by Kartik Banegas MD, 03/22/25, 11:22 EDT.  [facility] Hospitalist Team

## 2025-03-22 NOTE — PLAN OF CARE
Problem: Adult Inpatient Plan of Care  Goal: Plan of Care Review  Outcome: Progressing  Flowsheets (Taken 3/22/2025 0600)  Progress: improving  Plan of Care Reviewed With: patient  Goal: Patient-Specific Goal (Individualized)  Outcome: Progressing  Goal: Absence of Hospital-Acquired Illness or Injury  Outcome: Progressing  Intervention: Identify and Manage Fall Risk  Recent Flowsheet Documentation  Taken 3/22/2025 0641 by Yoly Betancur RN  Safety Promotion/Fall Prevention:   activity supervised   assistive device/personal items within reach   clutter free environment maintained   fall prevention program maintained   lighting adjusted   nonskid shoes/slippers when out of bed   safety round/check completed  Taken 3/22/2025 0427 by Yoly Betancur RN  Safety Promotion/Fall Prevention:   activity supervised   assistive device/personal items within reach   clutter free environment maintained   fall prevention program maintained   lighting adjusted   nonskid shoes/slippers when out of bed   safety round/check completed  Taken 3/22/2025 0200 by Yoly Betancur RN  Safety Promotion/Fall Prevention:   activity supervised   assistive device/personal items within reach   clutter free environment maintained   fall prevention program maintained   lighting adjusted   nonskid shoes/slippers when out of bed   safety round/check completed  Taken 3/21/2025 2344 by Yoly Betancur RN  Safety Promotion/Fall Prevention:   activity supervised   assistive device/personal items within reach   clutter free environment maintained   fall prevention program maintained   lighting adjusted   nonskid shoes/slippers when out of bed   safety round/check completed  Taken 3/21/2025 2155 by Yoly Betancur RN  Safety Promotion/Fall Prevention:   activity supervised   assistive device/personal items within reach   clutter free environment maintained   fall prevention program maintained   lighting adjusted   nonskid shoes/slippers when out of  bed   safety round/check completed  Taken 3/21/2025 2100 by Yoly Betancur RN  Safety Promotion/Fall Prevention:   activity supervised   assistive device/personal items within reach   clutter free environment maintained   fall prevention program maintained   lighting adjusted   nonskid shoes/slippers when out of bed   safety round/check completed  Intervention: Prevent Skin Injury  Recent Flowsheet Documentation  Taken 3/22/2025 0641 by Yoly Betancur RN  Skin Protection: transparent dressing maintained  Taken 3/21/2025 2100 by Yoly Betancur RN  Body Position:   weight shifting   side-lying   left  Skin Protection:   incontinence pads utilized   transparent dressing maintained  Intervention: Prevent and Manage VTE (Venous Thromboembolism) Risk  Recent Flowsheet Documentation  Taken 3/21/2025 2100 by Yoly Betancur RN  VTE Prevention/Management:   bilateral   SCDs (sequential compression devices) on  Intervention: Prevent Infection  Recent Flowsheet Documentation  Taken 3/22/2025 0641 by Yoly Betancur RN  Infection Prevention:   equipment surfaces disinfected   hand hygiene promoted   rest/sleep promoted   single patient room provided  Taken 3/22/2025 0427 by Yoly Betancur RN  Infection Prevention:   equipment surfaces disinfected   hand hygiene promoted   rest/sleep promoted   single patient room provided  Taken 3/22/2025 0200 by Yoly Betancur RN  Infection Prevention:   equipment surfaces disinfected   hand hygiene promoted   rest/sleep promoted   single patient room provided  Taken 3/21/2025 2344 by Yoly Betancur RN  Infection Prevention:   equipment surfaces disinfected   hand hygiene promoted   rest/sleep promoted   single patient room provided  Taken 3/21/2025 2155 by Yoly Betancur RN  Infection Prevention:   equipment surfaces disinfected   hand hygiene promoted   rest/sleep promoted   single patient room provided  Taken 3/21/2025 2100 by Yoly Betancur RN  Infection Prevention:    equipment surfaces disinfected   hand hygiene promoted   rest/sleep promoted   single patient room provided  Goal: Optimal Comfort and Wellbeing  Outcome: Progressing  Intervention: Monitor Pain and Promote Comfort  Recent Flowsheet Documentation  Taken 3/22/2025 0641 by Yoly Betancur RN  Pain Management Interventions:   pain medication given   quiet environment facilitated  Taken 3/21/2025 2344 by Yoly Betancur RN  Pain Management Interventions:   care clustered   pain medication given   position adjusted  Intervention: Provide Person-Centered Care  Recent Flowsheet Documentation  Taken 3/21/2025 2100 by Yoly Betancur RN  Trust Relationship/Rapport:   care explained   choices provided   thoughts/feelings acknowledged  Goal: Readiness for Transition of Care  Outcome: Progressing   Goal Outcome Evaluation:  Plan of Care Reviewed With: patient        Progress: improving

## 2025-03-22 NOTE — PLAN OF CARE
Problem: Adult Inpatient Plan of Care  Goal: Plan of Care Review  Outcome: Progressing  Goal: Patient-Specific Goal (Individualized)  Outcome: Progressing  Goal: Absence of Hospital-Acquired Illness or Injury  Outcome: Progressing  Intervention: Identify and Manage Fall Risk  Recent Flowsheet Documentation  Taken 3/22/2025 1623 by Ro Salmon RN  Safety Promotion/Fall Prevention:   assistive device/personal items within reach   clutter free environment maintained   fall prevention program maintained   nonskid shoes/slippers when out of bed   room organization consistent   safety round/check completed  Taken 3/22/2025 1600 by Ro Salmon RN  Safety Promotion/Fall Prevention: safety round/check completed  Taken 3/22/2025 1400 by Ro Salmon RN  Safety Promotion/Fall Prevention: safety round/check completed  Taken 3/22/2025 1251 by Ro Salmon RN  Safety Promotion/Fall Prevention:   assistive device/personal items within reach   clutter free environment maintained   fall prevention program maintained   nonskid shoes/slippers when out of bed   room organization consistent   safety round/check completed  Taken 3/22/2025 1200 by Ro Salmon RN  Safety Promotion/Fall Prevention: safety round/check completed  Taken 3/22/2025 1000 by Ro Salmon RN  Safety Promotion/Fall Prevention: safety round/check completed  Intervention: Prevent Skin Injury  Recent Flowsheet Documentation  Taken 3/22/2025 1623 by Ro Salmon RN  Body Position: weight shifting  Taken 3/22/2025 1251 by Ro Salmon RN  Body Position: weight shifting  Skin Protection: transparent dressing maintained  Taken 3/22/2025 0800 by Ro Salmon RN  Body Position: weight shifting  Skin Protection: transparent dressing maintained  Intervention: Prevent and Manage VTE (Venous Thromboembolism) Risk  Recent Flowsheet Documentation  Taken 3/22/2025 0800 by Ro Salmon RN  VTE Prevention/Management:    bilateral   SCDs (sequential compression devices) off   patient refused intervention  Intervention: Prevent Infection  Recent Flowsheet Documentation  Taken 3/22/2025 1251 by Ro Salmon RN  Infection Prevention: hand hygiene promoted  Goal: Optimal Comfort and Wellbeing  Outcome: Progressing  Intervention: Monitor Pain and Promote Comfort  Recent Flowsheet Documentation  Taken 3/22/2025 1251 by Ro Salmon RN  Pain Management Interventions: pain medication given  Taken 3/22/2025 1031 by Ro Salmon RN  Pain Management Interventions: pain medication given  Goal: Readiness for Transition of Care  Outcome: Progressing   Goal Outcome Evaluation:      Patient did not have any new tests or procedures. The patient is on 2L of oxygen. The patient complained of lower back pain and was given PRN pain medication. See MAR. Patient is waiting on precert for rehab. Will continue to monitor.

## 2025-03-23 LAB — GLUCOSE BLDC GLUCOMTR-MCNC: 115 MG/DL (ref 70–105)

## 2025-03-23 PROCEDURE — 97110 THERAPEUTIC EXERCISES: CPT

## 2025-03-23 PROCEDURE — 82948 REAGENT STRIP/BLOOD GLUCOSE: CPT

## 2025-03-23 PROCEDURE — 97530 THERAPEUTIC ACTIVITIES: CPT

## 2025-03-23 PROCEDURE — 97112 NEUROMUSCULAR REEDUCATION: CPT

## 2025-03-23 RX ADMIN — HYDRALAZINE HYDROCHLORIDE 25 MG: 25 TABLET ORAL at 14:20

## 2025-03-23 RX ADMIN — OXYCODONE 5 MG: 5 TABLET ORAL at 00:06

## 2025-03-23 RX ADMIN — Medication 10 ML: at 08:24

## 2025-03-23 RX ADMIN — ESCITALOPRAM 20 MG: 10 TABLET, FILM COATED ORAL at 08:24

## 2025-03-23 RX ADMIN — CLOPIDOGREL BISULFATE 75 MG: 75 TABLET ORAL at 08:24

## 2025-03-23 RX ADMIN — ASPIRIN 81 MG: 81 TABLET, COATED ORAL at 08:24

## 2025-03-23 RX ADMIN — CHLORTHALIDONE 25 MG: 25 TABLET ORAL at 08:24

## 2025-03-23 RX ADMIN — LOSARTAN POTASSIUM 100 MG: 50 TABLET, FILM COATED ORAL at 08:24

## 2025-03-23 RX ADMIN — HYDRALAZINE HYDROCHLORIDE 25 MG: 25 TABLET ORAL at 20:21

## 2025-03-23 RX ADMIN — BISACODYL 5 MG: 5 TABLET, COATED ORAL at 00:06

## 2025-03-23 RX ADMIN — OXYCODONE 5 MG: 5 TABLET ORAL at 08:24

## 2025-03-23 RX ADMIN — HYDRALAZINE HYDROCHLORIDE 25 MG: 25 TABLET ORAL at 06:17

## 2025-03-23 RX ADMIN — LEVOTHYROXINE SODIUM 175 MCG: 175 TABLET ORAL at 06:17

## 2025-03-23 RX ADMIN — AMLODIPINE BESYLATE 10 MG: 5 TABLET ORAL at 08:24

## 2025-03-23 RX ADMIN — Medication 10 ML: at 20:22

## 2025-03-23 NOTE — THERAPY TREATMENT NOTE
Subjective: Pt agreeable to therapeutic plan of care.Pt stated they gave her laxative last night and she's had multiple times of being incontinent of BM today. Agreed to stand using sarita stedy    Objective:     Precautions - falls    Bed mobility - Min-A  Transfers - Min-A to CTS using sarita stedy 3x approx 2 mins each  Ambulation -  feet N/A or Not attempted.    Therapeutic Exercise - 10 Reps B LE AROM unsupported sitting / EOB    Vitals: WNL    Pain: 0 VAS     Education: Provided education on the importance of mobility in the acute care setting, Verbal/Tactile Cues, and Transfer Training    Assessment: Leix Gilbert presents with functional mobility impairments which indicate the need for skilled intervention. Tolerating session today without incident. Pt presents with L extremity weakness, tends to veer to L when fatigued. Plans on acute rehab at [facility]. Will continue to follow and progress as tolerated.     Plan/Recommendations:   If medically appropriate, High Intensity Therapy recommended post-acute care. This is recommended as therapy feels the patient would require 5-6 days per week, 2-3 hours per day. At this time, inpatient rehabilitation (acute rehab) would be the first choice and SNF would be second. Pt requires no DME at discharge.     Pt desires Inpatient Rehabilitation placement at discharge. Pt cooperative; agreeable to therapeutic recommendations and plan of care.         Basic Mobility 6-click:  Rollin = Total, A lot = 2, A little = 3; 4 = None  Supine>Sit:   1 = Total, A lot = 2, A little = 3; 4 = None   Sit>Stand with arms:  1 = Total, A lot = 2, A little = 3; 4 = None  Bed>Chair:   1 = Total, A lot = 2, A little = 3; 4 = None  Ambulate in room:  1 = Total, A lot = 2, A little = 3; 4 = None  3-5 Steps with railin = Total, A lot = 2, A little = 3; 4 = None  Score: 12    Modified Tyrrell: 4 = Moderately severe disability (Unable to attend to own bodily needs without assistance, and  unable to walk unassisted)     Post-Tx Position: Supine with HOB Elevated, Alarms activated, and Call light and personal items within reach  PPE: gloves    Therapy Charges for Today       Code Description Service Date Service Provider Modifiers Qty    85038588116 HC PT NEUROMUSC RE EDUCATION EA 15 MIN 3/23/2025 Veronica La PTA GP 1    80799851542 HC PT THERAPEUTIC ACT EA 15 MIN 3/23/2025 Veronica La, MOON GP 1    96622158839 HC PT THER PROC EA 15 MIN 3/23/2025 Veronica La PTA GP 1           PT Charges       Row Name 03/23/25 1718             Time Calculation    Start Time 1554  -      Stop Time 1620  -      Time Calculation (min) 26 min  -      PT Received On 03/23/25  -      PT - Next Appointment 03/24/25  -         Time Calculation- PT    Total Timed Code Minutes- PT 26 minute(s)  -                User Key  (r) = Recorded By, (t) = Taken By, (c) = Cosigned By      Initials Name Provider Type    Veronica Ohara PTA Physical Therapist Assistant

## 2025-03-23 NOTE — PLAN OF CARE
Problem: Adult Inpatient Plan of Care  Goal: Plan of Care Review  Outcome: Progressing  Goal: Patient-Specific Goal (Individualized)  Outcome: Progressing  Goal: Absence of Hospital-Acquired Illness or Injury  Outcome: Progressing  Intervention: Identify and Manage Fall Risk  Recent Flowsheet Documentation  Taken 3/23/2025 1358 by Louisa Enriquez RN  Safety Promotion/Fall Prevention:   activity supervised   assistive device/personal items within reach   clutter free environment maintained   fall prevention program maintained   nonskid shoes/slippers when out of bed   room organization consistent   safety round/check completed  Taken 3/23/2025 1152 by Louisa Enriquez RN  Safety Promotion/Fall Prevention:   activity supervised   assistive device/personal items within reach   clutter free environment maintained   fall prevention program maintained   nonskid shoes/slippers when out of bed   room organization consistent   safety round/check completed  Taken 3/23/2025 1000 by Louisa Enriquez RN  Safety Promotion/Fall Prevention:   activity supervised   assistive device/personal items within reach   clutter free environment maintained   fall prevention program maintained   nonskid shoes/slippers when out of bed   room organization consistent   safety round/check completed  Taken 3/23/2025 0800 by Louisa Enriquez RN  Safety Promotion/Fall Prevention:   activity supervised   assistive device/personal items within reach   clutter free environment maintained   fall prevention program maintained   nonskid shoes/slippers when out of bed   room organization consistent   safety round/check completed  Intervention: Prevent Skin Injury  Recent Flowsheet Documentation  Taken 3/23/2025 1358 by Louisa Enriquez RN  Body Position:   position changed independently   weight shifting   sitting up in bed  Taken 3/23/2025 1152 by Louisa Enriquez RN  Body Position:   position changed independently   supine   weight shifting  Skin Protection:  incontinence pads utilized  Taken 3/23/2025 1000 by Louisa Enriquez RN  Body Position:   position changed independently   supine   weight shifting  Taken 3/23/2025 0800 by Louisa Enriquez RN  Body Position:   position changed independently   turned   right   supine   weight shifting  Skin Protection: incontinence pads utilized  Intervention: Prevent and Manage VTE (Venous Thromboembolism) Risk  Recent Flowsheet Documentation  Taken 3/23/2025 0800 by Louisa Enriquez RN  VTE Prevention/Management:   bilateral   SCDs (sequential compression devices) on  Intervention: Prevent Infection  Recent Flowsheet Documentation  Taken 3/23/2025 1358 by Louisa Enriquez RN  Infection Prevention:   environmental surveillance performed   equipment surfaces disinfected   hand hygiene promoted  Taken 3/23/2025 1152 by Louisa Enriquez RN  Infection Prevention:   environmental surveillance performed   equipment surfaces disinfected   hand hygiene promoted  Taken 3/23/2025 1000 by Louisa Enriquez RN  Infection Prevention:   environmental surveillance performed   equipment surfaces disinfected   hand hygiene promoted  Taken 3/23/2025 0800 by Louisa Enriquez RN  Infection Prevention:   environmental surveillance performed   equipment surfaces disinfected   hand hygiene promoted  Goal: Optimal Comfort and Wellbeing  Outcome: Progressing  Intervention: Provide Person-Centered Care  Recent Flowsheet Documentation  Taken 3/23/2025 1152 by Louisa Enriquez RN  Trust Relationship/Rapport:   care explained   choices provided   emotional support provided   empathic listening provided   questions answered   questions encouraged   reassurance provided   thoughts/feelings acknowledged  Taken 3/23/2025 0800 by Louisa Enriquez RN  Trust Relationship/Rapport:   care explained   choices provided   emotional support provided   empathic listening provided   questions answered   questions encouraged   reassurance provided   thoughts/feelings  acknowledged  Goal: Readiness for Transition of Care  Outcome: Progressing     Problem: Skin Injury Risk Increased  Goal: Skin Health and Integrity  Outcome: Progressing  Intervention: Optimize Skin Protection  Recent Flowsheet Documentation  Taken 3/23/2025 1358 by Louisa Enriquez RN  Activity Management: sitting, edge of bed  Head of Bed (HOB) Positioning: HOB elevated  Taken 3/23/2025 1152 by Louisa Enriquez RN  Activity Management: activity encouraged  Pressure Reduction Techniques:   frequent weight shift encouraged   weight shift assistance provided  Head of Bed (HOB) Positioning: HOB elevated  Pressure Reduction Devices: pressure-redistributing mattress utilized  Skin Protection: incontinence pads utilized  Taken 3/23/2025 1000 by Louisa Enriquez RN  Activity Management: activity encouraged  Head of Bed (HOB) Positioning: HOB elevated  Taken 3/23/2025 0800 by Louisa Enriquez RN  Activity Management: activity encouraged  Pressure Reduction Techniques:   frequent weight shift encouraged   weight shift assistance provided  Head of Bed (HOB) Positioning: HOB at 45 degrees  Pressure Reduction Devices: pressure-redistributing mattress utilized  Skin Protection: incontinence pads utilized  Intervention: Promote and Optimize Oral Intake  Recent Flowsheet Documentation  Taken 3/23/2025 1358 by Louisa Enriquez RN  Oral Nutrition Promotion: rest periods promoted  Taken 3/23/2025 1152 by Louisa Enriquez RN  Oral Nutrition Promotion: rest periods promoted  Taken 3/23/2025 0800 by Louisa Enriquez RN  Oral Nutrition Promotion: rest periods promoted     Problem: Stroke, Ischemic (Includes Transient Ischemic Attack)  Goal: Optimal Coping  Outcome: Progressing  Intervention: Support Psychosocial Response to Stroke  Recent Flowsheet Documentation  Taken 3/23/2025 1152 by Louisa Enriquez RN  Supportive Measures:   active listening utilized   verbalization of feelings encouraged  Family/Support System Care:   self-care  encouraged   support provided  Taken 3/23/2025 0800 by Louisa Enriquez RN  Supportive Measures:   active listening utilized   verbalization of feelings encouraged  Family/Support System Care:   self-care encouraged   support provided  Goal: Effective Bowel Elimination  Outcome: Progressing  Goal: Optimal Cerebral Tissue Perfusion  Outcome: Progressing  Intervention: Protect and Optimize Cerebral Perfusion  Recent Flowsheet Documentation  Taken 3/23/2025 1152 by Louisa Enriquez RN  Fluid/Electrolyte Management: fluids provided  Cerebral Perfusion Promotion: blood pressure monitored  Taken 3/23/2025 0800 by Louisa Enriquez RN  Fluid/Electrolyte Management: fluids provided  Cerebral Perfusion Promotion: blood pressure monitored  Goal: Optimal Cognitive Function  Outcome: Progressing  Intervention: Optimize Cognitive Function  Recent Flowsheet Documentation  Taken 3/23/2025 1152 by Louisa Enriquez RN  Reorientation Measures:   calendar in view   clock in view  Taken 3/23/2025 0800 by Louisa Enriquez RN  Reorientation Measures:   calendar in view   clock in view  Environment Familiarity/Consistency: daily routine followed  Goal: Improved Communication Skills  Outcome: Progressing  Intervention: Optimize Communication Skills  Recent Flowsheet Documentation  Taken 3/23/2025 1152 by Louisa Enriquez RN  Communication Enhancement Strategies: call light answered in person  Taken 3/23/2025 0800 by Louisa Enriquez RN  Communication Enhancement Strategies: call light answered in person  Goal: Optimal Functional Ability  Outcome: Progressing  Intervention: Optimize Functional Ability  Recent Flowsheet Documentation  Taken 3/23/2025 1358 by Louisa Enriquez RN  Activity Management: sitting, edge of bed  Self-Care Promotion:   independence encouraged   BADL personal objects within reach  Taken 3/23/2025 1152 by Louisa Enriquez RN  Activity Management: activity encouraged  Self-Care Promotion:   independence encouraged   BADL personal  objects within reach  Taken 3/23/2025 1000 by Louisa Enriquez RN  Activity Management: activity encouraged  Self-Care Promotion: independence encouraged  Taken 3/23/2025 0800 by Louisa Enriquez RN  Activity Management: activity encouraged  Self-Care Promotion:   independence encouraged   BADL personal objects within reach  Goal: Optimal Nutrition Intake  Outcome: Progressing  Intervention: Promote and Optimize Fluid and Food Intake  Recent Flowsheet Documentation  Taken 3/23/2025 1358 by Louisa Enriquez RN  Oral Nutrition Promotion: rest periods promoted  Taken 3/23/2025 1152 by Louisa Enriquez RN  Oral Nutrition Promotion: rest periods promoted  Taken 3/23/2025 0800 by Louisa Enriquez RN  Oral Nutrition Promotion: rest periods promoted  Goal: Effective Oxygenation and Ventilation  Outcome: Progressing  Intervention: Optimize Oxygenation and Ventilation  Recent Flowsheet Documentation  Taken 3/23/2025 1358 by Louisa Enriquez RN  Head of Bed (HOB) Positioning: HOB elevated  Taken 3/23/2025 1152 by Louisa Enriquez RN  Head of Bed (HOB) Positioning: HOB elevated  Airway/Ventilation Management: airway patency maintained  Taken 3/23/2025 1000 by Louisa Enriquez RN  Head of Bed (HOB) Positioning: HOB elevated  Taken 3/23/2025 0800 by Louisa Enriquez RN  Head of Bed (HOB) Positioning: HOB at 45 degrees  Goal: Improved Sensorimotor Function  Outcome: Progressing  Intervention: Optimize Range of Motion, Motor Control and Function  Recent Flowsheet Documentation  Taken 3/23/2025 1358 by Louisa Enriquez RN  Positioning/Transfer Devices:   pillows   in use  Taken 3/23/2025 1152 by Louisa Enriquez RN  Positioning/Transfer Devices:   pillows   in use  Range of Motion: active ROM (range of motion) encouraged  Taken 3/23/2025 1000 by Louisa Enriquez RN  Positioning/Transfer Devices:   pillows   in use  Taken 3/23/2025 0800 by Louisa Enriquez RN  Positioning/Transfer Devices:   pillows   in use  Range of Motion: active ROM (range of  motion) encouraged  Intervention: Optimize Sensory and Perceptual Ability  Recent Flowsheet Documentation  Taken 3/23/2025 1152 by oLuisa Enriquez RN  Pressure Reduction Techniques:   frequent weight shift encouraged   weight shift assistance provided  Sensation Impairment Protection: cues provided for safety  Pressure Reduction Devices: pressure-redistributing mattress utilized  Taken 3/23/2025 0800 by Louisa Enrqiuez RN  Pressure Reduction Techniques:   frequent weight shift encouraged   weight shift assistance provided  Pressure Reduction Devices: pressure-redistributing mattress utilized  Goal: Safe and Effective Swallow  Outcome: Progressing  Intervention: Optimize Eating and Swallowing  Recent Flowsheet Documentation  Taken 3/23/2025 1358 by Louisa Enriquez RN  Aspiration Precautions: awake/alert before oral intake  Taken 3/23/2025 1152 by Louisa Enriquez RN  Aspiration Precautions: awake/alert before oral intake  Taken 3/23/2025 1000 by Louisa Enriquez RN  Aspiration Precautions: awake/alert before oral intake  Taken 3/23/2025 0800 by Louisa Enriquez RN  Aspiration Precautions: awake/alert before oral intake  Goal: Effective Urinary Elimination  Outcome: Progressing     Problem: Pain Acute  Goal: Optimal Pain Control and Function  Outcome: Progressing  Intervention: Optimize Psychosocial Wellbeing  Recent Flowsheet Documentation  Taken 3/23/2025 1152 by Louisa Enriquez RN  Supportive Measures:   active listening utilized   verbalization of feelings encouraged  Diversional Activities: television  Taken 3/23/2025 0800 by Louisa Enriquez RN  Supportive Measures:   active listening utilized   verbalization of feelings encouraged  Diversional Activities: television  Intervention: Prevent or Manage Pain  Recent Flowsheet Documentation  Taken 3/23/2025 1358 by Louisa Enriquez RN  Medication Review/Management: medications reviewed  Taken 3/23/2025 1152 by Louisa Enriquez RN  Medication Review/Management: medications  reviewed  Taken 3/23/2025 1000 by Louisa Enriquez, RN  Medication Review/Management: medications reviewed  Taken 3/23/2025 0800 by Louisa Enriquez, RN  Medication Review/Management: medications reviewed   Goal Outcome Evaluation:            Patient a/ox4, assist x 2, sitting on edge of bed for meals, incontinent of bowel and bladder, remains high falls precautions, staff continues to turn and reposition patient q 2 hours and as needed, call light in reach, plan is for rehab placement.        Reason for admission: HTN  Significant PMH: CVA, Stroke, HTN, hypothyroid, IBS  Significant 24 hour events: dizzy, fall, L sided weakness, MRI + stroke, HTN (labetalol), CTA/Carotids (stenosis, no occlusion), echo EF 60% , assisted fall 3/17  Significant Assessment Findings: L arm weakness/tingling  Social: plan to DC to [facility]  Additional Info:   Mobility Plan: 2 assist with STEDY for transfers  Discharge Plan: [facility] pending precertification

## 2025-03-23 NOTE — PROGRESS NOTES
[facility] MEDICINE SERVICE  DAILY PROGRESS NOTE    NAME: Lexi Gilbert  : 1950  MRN: [id number]      LOS: 5 days     PROVIDER OF SERVICE: Ro Soliman MD    Chief Complaint: Accelerated hypertension    Subjective:     Interval History:    Patient seen and evaluated at bedside. No new complaints. BP largely controlled.     Treatment plan discussed with patient. All questions addressed.     Review of Systems:   Denies fevers, chills  Denies chest pain, edema  Denies shortness of breath, cough  Denies nausea, vomiting, diarrhea  Denies dysuria, hematuria    Objective:     Vital Signs  Temp:  [97.7 °F (36.5 °C)-97.9 °F (36.6 °C)] 97.9 °F (36.6 °C)  Heart Rate:  [61-75] 73  Resp:  [13-22] 15  BP: (136-169)/(63-90) 136/63  Flow (L/min) (Oxygen Therapy):  [2] 2   Body mass index is 44.56 kg/m².    Physical Exam   General: No acute distress, alert and oriented  CV: RRR, no peripheral edema  Pulm: No increased work of breathing  Psych: Appropriate mood and affect    Scheduled Meds   amLODIPine, 10 mg, Oral, Daily  aspirin, 81 mg, Oral, Daily  chlorthalidone, 25 mg, Oral, Daily  clopidogrel, 75 mg, Oral, Daily  [Held by provider] enoxaparin sodium, 40 mg, Subcutaneous, Q24H  escitalopram, 20 mg, Oral, Daily  hydrALAZINE, 25 mg, Oral, Q8H  levothyroxine, 175 mcg, Oral, Q AM  losartan, 100 mg, Oral, Q24H  sodium chloride, 10 mL, Intravenous, Q12H       PRN Meds     acetaminophen **OR** acetaminophen **OR** acetaminophen    senna-docusate sodium **AND** polyethylene glycol **AND** bisacodyl **AND** bisacodyl    diphenhydrAMINE-zinc acetate    ipratropium-albuterol    labetalol    melatonin    nitroglycerin    ondansetron    oxyCODONE    [COMPLETED] Insert Peripheral IV **AND** sodium chloride    sodium chloride    sodium chloride   Infusions         Diagnostic Data    Results from last 7 days   Lab Units 25  0529 25  0615 25  0354   WBC 10*3/mm3 7.29   < > 8.83   HEMOGLOBIN g/dL 13.1   <  > 15.0   HEMATOCRIT % 42.0   < > 47.7*   PLATELETS 10*3/mm3 236   < > 253   GLUCOSE mg/dL 100*   < > 118*   CREATININE mg/dL 0.63   < > 0.67   BUN mg/dL 19   < > 19   SODIUM mmol/L 142   < > 142   POTASSIUM mmol/L 4.2   < > 4.3   AST (SGOT) U/L  --   --  19   ALT (SGPT) U/L  --   --  11   ALK PHOS U/L  --   --  81   BILIRUBIN mg/dL  --   --  0.3   ANION GAP mmol/L 12.6   < > 11.5    < > = values in this interval not displayed.       No radiology results for the last day    Interval results reviewed.    Assessment/Plan:     Acute infarct of lateral right thalamus  Chronic lacunar infarct of posterior left putamen  - Neurology consulted, have since signed off  - Continue aspirin, plavix, statin  - MCOT at discharge  - Will need outpatient neuro follow up  - PT/OT rec'd IRF at discharge    Hypertensive emergency  History of hypertension  - Continue amlodipine, chlorthalidone, hydralazine, losartan    Hypothyroidism  - Continue levothyroxine    History of anxiety  History of depression  - Continue lexapro    Suspected MONIQUE  - Recommend outpatient sleep study    Treatment plan discussed with nursing staff.    VTE Prophylaxis:  Pharmacologic & mechanical VTE prophylaxis orders are present.    Code status is   Code Status and Medical Interventions: CPR (Attempt to Resuscitate); Full Support   Ordered at: 03/17/25 0559     Code Status (Patient has no pulse and is not breathing):    CPR (Attempt to Resuscitate)     Medical Interventions (Patient has pulse or is breathing):    Full Support       Plan for disposition: IRF 3/24/25    Barriers to discharge: Pending placement, bp monitoring    Time: 35+ minutes     Signature: Electronically signed by Ro Soliman MD, 03/23/25, 08:44 EDT.  [facility] Hospitalist Team

## 2025-03-23 NOTE — PLAN OF CARE
Assessment: Lexi Gilbert presents with functional mobility impairments which indicate the need for skilled intervention. Tolerating session today without incident. Pt presents with L extremity weakness, tends to veer to L when fatigued. Plans on acute rehab at [facility]. Will continue to follow and progress as tolerated.

## 2025-03-24 LAB
ANION GAP SERPL CALCULATED.3IONS-SCNC: 10.4 MMOL/L (ref 5–15)
BUN SERPL-MCNC: 21 MG/DL (ref 8–23)
BUN/CREAT SERPL: 32.8 (ref 7–25)
CALCIUM SPEC-SCNC: 10.1 MG/DL (ref 8.6–10.5)
CHLORIDE SERPL-SCNC: 102 MMOL/L (ref 98–107)
CO2 SERPL-SCNC: 26.6 MMOL/L (ref 22–29)
CREAT SERPL-MCNC: 0.64 MG/DL (ref 0.57–1)
DEPRECATED RDW RBC AUTO: 45.4 FL (ref 37–54)
EGFRCR SERPLBLD CKD-EPI 2021: 91.7 ML/MIN/1.73
ERYTHROCYTE [DISTWIDTH] IN BLOOD BY AUTOMATED COUNT: 13.5 % (ref 12.3–15.4)
GLUCOSE SERPL-MCNC: 95 MG/DL (ref 65–99)
HCT VFR BLD AUTO: 46.3 % (ref 34–46.6)
HGB BLD-MCNC: 14.7 G/DL (ref 12–15.9)
MCH RBC QN AUTO: 29 PG (ref 26.6–33)
MCHC RBC AUTO-ENTMCNC: 31.7 G/DL (ref 31.5–35.7)
MCV RBC AUTO: 91.3 FL (ref 79–97)
PLATELET # BLD AUTO: 289 10*3/MM3 (ref 140–450)
PMV BLD AUTO: 10.7 FL (ref 6–12)
POTASSIUM SERPL-SCNC: 3.5 MMOL/L (ref 3.5–5.2)
QT INTERVAL: 505 MS
QTC INTERVAL: 496 MS
RBC # BLD AUTO: 5.07 10*6/MM3 (ref 3.77–5.28)
SODIUM SERPL-SCNC: 139 MMOL/L (ref 136–145)
WBC NRBC COR # BLD AUTO: 7.48 10*3/MM3 (ref 3.4–10.8)

## 2025-03-24 PROCEDURE — 93005 ELECTROCARDIOGRAM TRACING: CPT | Performed by: STUDENT IN AN ORGANIZED HEALTH CARE EDUCATION/TRAINING PROGRAM

## 2025-03-24 PROCEDURE — 85027 COMPLETE CBC AUTOMATED: CPT | Performed by: STUDENT IN AN ORGANIZED HEALTH CARE EDUCATION/TRAINING PROGRAM

## 2025-03-24 PROCEDURE — 80048 BASIC METABOLIC PNL TOTAL CA: CPT | Performed by: STUDENT IN AN ORGANIZED HEALTH CARE EDUCATION/TRAINING PROGRAM

## 2025-03-24 RX ORDER — OXYCODONE HYDROCHLORIDE 5 MG/1
5 TABLET ORAL EVERY 6 HOURS PRN
Refills: 0 | Status: DISCONTINUED | OUTPATIENT
Start: 2025-03-24 | End: 2025-03-25

## 2025-03-24 RX ADMIN — ACETAMINOPHEN 650 MG: 325 TABLET, FILM COATED ORAL at 17:22

## 2025-03-24 RX ADMIN — LOSARTAN POTASSIUM 100 MG: 50 TABLET, FILM COATED ORAL at 09:39

## 2025-03-24 RX ADMIN — CHLORTHALIDONE 25 MG: 25 TABLET ORAL at 09:39

## 2025-03-24 RX ADMIN — Medication 5 MG: at 00:12

## 2025-03-24 RX ADMIN — ASPIRIN 81 MG: 81 TABLET, COATED ORAL at 09:40

## 2025-03-24 RX ADMIN — HYDRALAZINE HYDROCHLORIDE 25 MG: 25 TABLET ORAL at 21:06

## 2025-03-24 RX ADMIN — Medication 10 ML: at 09:40

## 2025-03-24 RX ADMIN — HYDRALAZINE HYDROCHLORIDE 25 MG: 25 TABLET ORAL at 06:00

## 2025-03-24 RX ADMIN — ESCITALOPRAM 20 MG: 10 TABLET, FILM COATED ORAL at 09:39

## 2025-03-24 RX ADMIN — ACETAMINOPHEN 650 MG: 325 TABLET, FILM COATED ORAL at 09:38

## 2025-03-24 RX ADMIN — ACETAMINOPHEN 650 MG: 325 TABLET, FILM COATED ORAL at 02:08

## 2025-03-24 RX ADMIN — Medication 5 MG: at 21:06

## 2025-03-24 RX ADMIN — LEVOTHYROXINE SODIUM 175 MCG: 175 TABLET ORAL at 06:00

## 2025-03-24 RX ADMIN — OXYCODONE HYDROCHLORIDE 5 MG: 5 TABLET ORAL at 23:10

## 2025-03-24 RX ADMIN — CLOPIDOGREL BISULFATE 75 MG: 75 TABLET ORAL at 09:39

## 2025-03-24 RX ADMIN — Medication 10 ML: at 21:06

## 2025-03-24 RX ADMIN — OXYCODONE HYDROCHLORIDE 5 MG: 5 TABLET ORAL at 14:36

## 2025-03-24 RX ADMIN — HYDRALAZINE HYDROCHLORIDE 25 MG: 25 TABLET ORAL at 14:36

## 2025-03-24 RX ADMIN — AMLODIPINE BESYLATE 10 MG: 5 TABLET ORAL at 09:37

## 2025-03-24 NOTE — CASE MANAGEMENT/SOCIAL WORK
Continued Stay Note  [facility]     Patient Name: Lexi Gilbert  MRN: [id number]  Today's Date: 3/24/2025    Admit Date: 3/17/2025    Plan: D/C Plan.  Initial [facility] auth was denied.  Starting the appeal process on 3/24/25.   Discharge Plan       Row Name 03/24/25 1019       Plan    Plan D/C Plan.  Initial [facility] auth was denied.  Starting the appeal process on 3/24/25.    Plan Comments Barrier to D/C:  Ins is going thru appeal process.                 Expected Discharge Date and Time       Expected Discharge Date Expected Discharge Time    Mar 25, 2025               Indy Corbin RN  RN/.  Office Ph. [phone]  Cell Ph.  [phone]

## 2025-03-24 NOTE — PLAN OF CARE
Problem: Adult Inpatient Plan of Care  Goal: Plan of Care Review  Outcome: Progressing  Goal: Patient-Specific Goal (Individualized)  Outcome: Progressing  Goal: Absence of Hospital-Acquired Illness or Injury  Outcome: Progressing  Intervention: Identify and Manage Fall Risk  Recent Flowsheet Documentation  Taken 3/24/2025 1800 by Ro Salmon RN  Safety Promotion/Fall Prevention: safety round/check completed  Taken 3/24/2025 1615 by Ro Salmon RN  Safety Promotion/Fall Prevention:   assistive device/personal items within reach   clutter free environment maintained   fall prevention program maintained   nonskid shoes/slippers when out of bed   room organization consistent   safety round/check completed  Taken 3/24/2025 1600 by Ro Salmon RN  Safety Promotion/Fall Prevention: safety round/check completed  Taken 3/24/2025 1400 by Ro Salmon RN  Safety Promotion/Fall Prevention: safety round/check completed  Taken 3/24/2025 1230 by Ro Salmon RN  Safety Promotion/Fall Prevention:   assistive device/personal items within reach   clutter free environment maintained   fall prevention program maintained   nonskid shoes/slippers when out of bed   room organization consistent   safety round/check completed  Taken 3/24/2025 1200 by Ro Salmon RN  Safety Promotion/Fall Prevention: safety round/check completed  Taken 3/24/2025 1000 by Ro Salmon RN  Safety Promotion/Fall Prevention: safety round/check completed  Taken 3/24/2025 0800 by Ro Salmon RN  Safety Promotion/Fall Prevention:   assistive device/personal items within reach   clutter free environment maintained   fall prevention program maintained   nonskid shoes/slippers when out of bed   room organization consistent   safety round/check completed  Intervention: Prevent Skin Injury  Recent Flowsheet Documentation  Taken 3/24/2025 1615 by Ro Salmon RN  Body Position: position changed  independently  Taken 3/24/2025 1230 by Ro Salmon RN  Body Position: position changed independently  Taken 3/24/2025 0800 by Ro Salmon RN  Body Position: position changed independently  Intervention: Prevent Infection  Recent Flowsheet Documentation  Taken 3/24/2025 1615 by Ro Salmon RN  Infection Prevention: hand hygiene promoted  Taken 3/24/2025 0800 by Ro Salmon RN  Infection Prevention: hand hygiene promoted  Goal: Optimal Comfort and Wellbeing  Outcome: Progressing  Intervention: Monitor Pain and Promote Comfort  Recent Flowsheet Documentation  Taken 3/24/2025 1722 by Ro Salmon RN  Pain Management Interventions: pain medication given  Taken 3/24/2025 0938 by Ro Salmon RN  Pain Management Interventions: pain medication given  Goal: Readiness for Transition of Care  Outcome: Progressing   Goal Outcome Evaluation:      Patient did not have any new tests or procedures. The patient remains on room air. The patient complained of back pain and was given PRN pain medication. See MAR. Will continue to monitor.

## 2025-03-24 NOTE — PLAN OF CARE
Goal Outcome Evaluation:  Plan of Care Reviewed With: patient        Progress: no change  Outcome Evaluation: Patient requires assistance with all ADLS and assist of two staff and use of STEDY for transfers.

## 2025-03-24 NOTE — PROGRESS NOTES
[facility] MEDICINE SERVICE  DAILY PROGRESS NOTE    NAME: Lexi Gilbert  : 1950  MRN: [id number]      LOS: 6 days     PROVIDER OF SERVICE: Ro Soliman MD    Chief Complaint: Accelerated hypertension    Subjective:     Interval History:    Patient seen and evaluated at bedside. No new complaints.  Anxious to be discharged.    Treatment plan discussed with patient. All questions addressed.     Review of Systems:   Denies fevers, chills  Denies chest pain, edema  Denies shortness of breath, cough  Denies nausea, vomiting, diarrhea  Denies dysuria, hematuria    Objective:     Vital Signs  Temp:  [97.7 °F (36.5 °C)-98.7 °F (37.1 °C)] 97.7 °F (36.5 °C)  Heart Rate:  [65-74] 68  Resp:  [12-24] 22  BP: (110-136)/(60-96) 131/96   Body mass index is 44.34 kg/m².    Physical Exam   General: No acute distress, alert and oriented  CV: RRR, no peripheral edema  Pulm: No increased work of breathing  Psych: Appropriate mood and affect    Scheduled Meds   amLODIPine, 10 mg, Oral, Daily  aspirin, 81 mg, Oral, Daily  chlorthalidone, 25 mg, Oral, Daily  clopidogrel, 75 mg, Oral, Daily  [Held by provider] enoxaparin sodium, 40 mg, Subcutaneous, Q24H  escitalopram, 20 mg, Oral, Daily  hydrALAZINE, 25 mg, Oral, Q8H  levothyroxine, 175 mcg, Oral, Q AM  losartan, 100 mg, Oral, Q24H  sodium chloride, 10 mL, Intravenous, Q12H       PRN Meds     acetaminophen **OR** acetaminophen **OR** acetaminophen    senna-docusate sodium **AND** polyethylene glycol **AND** bisacodyl **AND** bisacodyl    diphenhydrAMINE-zinc acetate    ipratropium-albuterol    melatonin    nitroglycerin    ondansetron    [COMPLETED] Insert Peripheral IV **AND** sodium chloride    sodium chloride    sodium chloride   Infusions         Diagnostic Data    Results from last 7 days   Lab Units 25  0004   WBC 10*3/mm3 7.48   HEMOGLOBIN g/dL 14.7   HEMATOCRIT % 46.3   PLATELETS 10*3/mm3 289   GLUCOSE mg/dL 95   CREATININE mg/dL 0.64   BUN mg/dL 21    SODIUM mmol/L 139   POTASSIUM mmol/L 3.5   ANION GAP mmol/L 10.4       No radiology results for the last day    Interval results reviewed.    Assessment/Plan:     Acute infarct of lateral right thalamus  Chronic lacunar infarct of posterior left putamen  - Neurology consulted, have since signed off  - Continue aspirin, plavix, statin  - MCOT at discharge  - Will need outpatient neuro follow up  - PT/OT rec'd IRF at discharge, placement pending    Hypertensive emergency  History of hypertension  - Continue amlodipine, chlorthalidone, hydralazine, losartan    Hypothyroidism  - Continue levothyroxine    History of anxiety  History of depression  - Continue lexapro    Suspected MONIQUE  - Recommend outpatient sleep study    Treatment plan discussed with nursing staff.    VTE Prophylaxis:  Pharmacologic & mechanical VTE prophylaxis orders are present.    Code status is   Code Status and Medical Interventions: CPR (Attempt to Resuscitate); Full Support   Ordered at: 03/17/25 0559     Code Status (Patient has no pulse and is not breathing):    CPR (Attempt to Resuscitate)     Medical Interventions (Patient has pulse or is breathing):    Full Support       Plan for disposition: IRF 3/25/25    Barriers to discharge: Pending placement, bp monitoring    Time: 35+ minutes     Signature: Electronically signed by Ro Soliman MD, 03/24/25, 08:33 EDT.  [facility] Hospitalist Team

## 2025-03-25 LAB
ANION GAP SERPL CALCULATED.3IONS-SCNC: 11 MMOL/L (ref 5–15)
BUN SERPL-MCNC: 27 MG/DL (ref 8–23)
BUN/CREAT SERPL: 33.3 (ref 7–25)
CALCIUM SPEC-SCNC: 10.4 MG/DL (ref 8.6–10.5)
CHLORIDE SERPL-SCNC: 103 MMOL/L (ref 98–107)
CO2 SERPL-SCNC: 25 MMOL/L (ref 22–29)
CREAT SERPL-MCNC: 0.81 MG/DL (ref 0.57–1)
DEPRECATED RDW RBC AUTO: 45.8 FL (ref 37–54)
EGFRCR SERPLBLD CKD-EPI 2021: 75.3 ML/MIN/1.73
ERYTHROCYTE [DISTWIDTH] IN BLOOD BY AUTOMATED COUNT: 13.4 % (ref 12.3–15.4)
GLUCOSE SERPL-MCNC: 109 MG/DL (ref 65–99)
HCT VFR BLD AUTO: 45 % (ref 34–46.6)
HGB BLD-MCNC: 14.1 G/DL (ref 12–15.9)
MCH RBC QN AUTO: 29 PG (ref 26.6–33)
MCHC RBC AUTO-ENTMCNC: 31.3 G/DL (ref 31.5–35.7)
MCV RBC AUTO: 92.6 FL (ref 79–97)
PLATELET # BLD AUTO: 280 10*3/MM3 (ref 140–450)
PMV BLD AUTO: 10.6 FL (ref 6–12)
POTASSIUM SERPL-SCNC: 3.9 MMOL/L (ref 3.5–5.2)
RBC # BLD AUTO: 4.86 10*6/MM3 (ref 3.77–5.28)
SODIUM SERPL-SCNC: 139 MMOL/L (ref 136–145)
WBC NRBC COR # BLD AUTO: 6.33 10*3/MM3 (ref 3.4–10.8)

## 2025-03-25 PROCEDURE — 97530 THERAPEUTIC ACTIVITIES: CPT | Performed by: OCCUPATIONAL THERAPIST

## 2025-03-25 PROCEDURE — 97110 THERAPEUTIC EXERCISES: CPT | Performed by: OCCUPATIONAL THERAPIST

## 2025-03-25 PROCEDURE — 97116 GAIT TRAINING THERAPY: CPT

## 2025-03-25 PROCEDURE — 80048 BASIC METABOLIC PNL TOTAL CA: CPT | Performed by: STUDENT IN AN ORGANIZED HEALTH CARE EDUCATION/TRAINING PROGRAM

## 2025-03-25 PROCEDURE — 97530 THERAPEUTIC ACTIVITIES: CPT

## 2025-03-25 PROCEDURE — 85027 COMPLETE CBC AUTOMATED: CPT | Performed by: STUDENT IN AN ORGANIZED HEALTH CARE EDUCATION/TRAINING PROGRAM

## 2025-03-25 RX ADMIN — HYDRALAZINE HYDROCHLORIDE 25 MG: 25 TABLET ORAL at 15:55

## 2025-03-25 RX ADMIN — CHLORTHALIDONE 25 MG: 25 TABLET ORAL at 09:36

## 2025-03-25 RX ADMIN — ESCITALOPRAM 20 MG: 10 TABLET, FILM COATED ORAL at 09:36

## 2025-03-25 RX ADMIN — LEVOTHYROXINE SODIUM 175 MCG: 175 TABLET ORAL at 06:26

## 2025-03-25 RX ADMIN — ACETAMINOPHEN 650 MG: 325 TABLET, FILM COATED ORAL at 15:55

## 2025-03-25 RX ADMIN — Medication 5 MG: at 22:09

## 2025-03-25 RX ADMIN — ASPIRIN 81 MG: 81 TABLET, COATED ORAL at 09:36

## 2025-03-25 RX ADMIN — Medication 10 ML: at 09:36

## 2025-03-25 RX ADMIN — Medication 10 ML: at 22:10

## 2025-03-25 RX ADMIN — HYDRALAZINE HYDROCHLORIDE 25 MG: 25 TABLET ORAL at 22:09

## 2025-03-25 RX ADMIN — HYDRALAZINE HYDROCHLORIDE 25 MG: 25 TABLET ORAL at 06:26

## 2025-03-25 RX ADMIN — CLOPIDOGREL BISULFATE 75 MG: 75 TABLET ORAL at 09:36

## 2025-03-25 RX ADMIN — LOSARTAN POTASSIUM 100 MG: 50 TABLET, FILM COATED ORAL at 09:36

## 2025-03-25 RX ADMIN — ACETAMINOPHEN 650 MG: 325 TABLET, FILM COATED ORAL at 22:09

## 2025-03-25 RX ADMIN — AMLODIPINE BESYLATE 10 MG: 5 TABLET ORAL at 09:36

## 2025-03-25 NOTE — PLAN OF CARE
Assessment: Lexi Gilbert 74 y.o. female with a CMH of hypothyroidism, anxiety/depression, hx of stroke ~15 years ago who presented on 3/17 with left-sided weakness. Per Neuro, pt with Chronic lacunar infarct in the posterior left putamen and Acute infarct within the lateral right thalamus likely 2/2 HTN. Up in chair upon arrival. Pt able to complete seated LE exerceses with yellow resistance band this date. Pt required Marie x2 to come to stand from chair and ModA x2 for short distand ambulation to bed. Pt with L knee buckling that required Marie to block toward end of bout. Pt required ModA x2 for sit to supine and to reposition in bed. OT continued with PT after exit. Tolerating session today without incident. Will continue to follow and progress as tolerated.

## 2025-03-25 NOTE — PLAN OF CARE
Problem: Adult Inpatient Plan of Care  Goal: Plan of Care Review  Outcome: Progressing  Goal: Patient-Specific Goal (Individualized)  Outcome: Progressing  Goal: Absence of Hospital-Acquired Illness or Injury  Outcome: Progressing  Intervention: Identify and Manage Fall Risk  Recent Flowsheet Documentation  Taken 3/25/2025 0600 by Debi Peterson RN  Safety Promotion/Fall Prevention:   activity supervised   assistive device/personal items within reach   clutter free environment maintained   fall prevention program maintained   lighting adjusted   nonskid shoes/slippers when out of bed   room organization consistent   safety round/check completed  Taken 3/25/2025 0400 by Debi Peterson RN  Safety Promotion/Fall Prevention:   activity supervised   assistive device/personal items within reach   clutter free environment maintained   fall prevention program maintained   safety round/check completed   room organization consistent  Taken 3/25/2025 0200 by Debi Peterson RN  Safety Promotion/Fall Prevention:   activity supervised   assistive device/personal items within reach   clutter free environment maintained   fall prevention program maintained   safety round/check completed   room organization consistent  Taken 3/25/2025 0000 by Debi Peterson RN  Safety Promotion/Fall Prevention:   activity supervised   assistive device/personal items within reach   clutter free environment maintained   fall prevention program maintained   safety round/check completed   room organization consistent  Taken 3/24/2025 2200 by Debi Peterson RN  Safety Promotion/Fall Prevention:   activity supervised   assistive device/personal items within reach   clutter free environment maintained   fall prevention program maintained   safety round/check completed   room organization consistent  Taken 3/24/2025 2000 by Debi Peterson RN  Safety Promotion/Fall Prevention:   activity supervised   assistive device/personal items  within reach   clutter free environment maintained   fall prevention program maintained   room organization consistent   safety round/check completed  Intervention: Prevent Skin Injury  Recent Flowsheet Documentation  Taken 3/25/2025 0400 by Debi Peterson RN  Body Position: position changed independently  Skin Protection: transparent dressing maintained  Taken 3/25/2025 0000 by Debi Peterson RN  Skin Protection:   transparent dressing maintained   incontinence pads utilized  Taken 3/24/2025 2000 by Debi Peterson RN  Body Position: position changed independently  Skin Protection: incontinence pads utilized  Intervention: Prevent Infection  Recent Flowsheet Documentation  Taken 3/25/2025 0600 by Debi Peterson RN  Infection Prevention:   environmental surveillance performed   hand hygiene promoted   rest/sleep promoted   single patient room provided   personal protective equipment utilized   equipment surfaces disinfected  Taken 3/25/2025 0400 by Debi Peterson RN  Infection Prevention:   environmental surveillance performed   equipment surfaces disinfected   hand hygiene promoted   personal protective equipment utilized   rest/sleep promoted   single patient room provided  Taken 3/25/2025 0200 by Debi Peterson RN  Infection Prevention:   environmental surveillance performed   equipment surfaces disinfected   hand hygiene promoted   personal protective equipment utilized   rest/sleep promoted   single patient room provided  Taken 3/25/2025 0000 by Debi Peterson RN  Infection Prevention:   environmental surveillance performed   equipment surfaces disinfected   hand hygiene promoted   personal protective equipment utilized   rest/sleep promoted   single patient room provided  Taken 3/24/2025 2200 by Debi Peterson RN  Infection Prevention:   environmental surveillance performed   equipment surfaces disinfected   hand hygiene promoted   personal protective equipment utilized    rest/sleep promoted   single patient room provided  Taken 3/24/2025 2000 by Debi Peterson RN  Infection Prevention:   environmental surveillance performed   equipment surfaces disinfected   hand hygiene promoted   personal protective equipment utilized   single patient room provided  Goal: Optimal Comfort and Wellbeing  Outcome: Progressing  Intervention: Provide Person-Centered Care  Recent Flowsheet Documentation  Taken 3/24/2025 2000 by Debi Peterson RN  Trust Relationship/Rapport:   care explained   choices provided  Goal: Readiness for Transition of Care  Outcome: Progressing     Problem: Skin Injury Risk Increased  Goal: Skin Health and Integrity  Outcome: Progressing  Intervention: Optimize Skin Protection  Recent Flowsheet Documentation  Taken 3/25/2025 0400 by Debi Peterson RN  Pressure Reduction Techniques: weight shift assistance provided  Head of Bed (HOB) Positioning: HOB elevated  Pressure Reduction Devices: pressure-redistributing mattress utilized  Skin Protection: transparent dressing maintained  Taken 3/25/2025 0000 by Debi Peterson RN  Pressure Reduction Techniques: weight shift assistance provided  Pressure Reduction Devices: pressure-redistributing mattress utilized  Skin Protection:   transparent dressing maintained   incontinence pads utilized  Taken 3/24/2025 2000 by Debi Peterson RN  Pressure Reduction Techniques:   weight shift assistance provided   frequent weight shift encouraged  Head of Bed (HOB) Positioning: HOB elevated  Pressure Reduction Devices: pressure-redistributing mattress utilized  Skin Protection: incontinence pads utilized     Problem: Stroke, Ischemic (Includes Transient Ischemic Attack)  Goal: Optimal Coping  Outcome: Progressing  Intervention: Support Psychosocial Response to Stroke  Recent Flowsheet Documentation  Taken 3/25/2025 0400 by Debi Peterson RN  Supportive Measures: active listening utilized  Family/Support System Care:   support  provided   self-care encouraged  Taken 3/25/2025 0000 by Debi Peterson RN  Supportive Measures: active listening utilized  Family/Support System Care:   self-care encouraged   support provided  Taken 3/24/2025 2000 by Debi Peterson RN  Supportive Measures: active listening utilized  Goal: Effective Bowel Elimination  Outcome: Progressing  Goal: Optimal Cerebral Tissue Perfusion  Outcome: Progressing  Intervention: Protect and Optimize Cerebral Perfusion  Recent Flowsheet Documentation  Taken 3/25/2025 0400 by Debi Peterson RN  Cerebral Perfusion Promotion: blood pressure monitored  Taken 3/25/2025 0000 by Debi Peterson RN  Cerebral Perfusion Promotion: blood pressure monitored  Taken 3/24/2025 2000 by Debi Peterson RN  Sensory Stimulation Regulation:   lighting decreased   care clustered  Cerebral Perfusion Promotion: blood pressure monitored  Goal: Optimal Cognitive Function  Outcome: Progressing  Intervention: Optimize Cognitive Function  Recent Flowsheet Documentation  Taken 3/25/2025 0000 by Debi Peterson RN  Reorientation Measures: clock in view  Taken 3/24/2025 2000 by Debi Peterson RN  Sensory Stimulation Regulation:   lighting decreased   care clustered  Reorientation Measures: clock in view  Goal: Improved Communication Skills  Outcome: Progressing  Intervention: Optimize Communication Skills  Recent Flowsheet Documentation  Taken 3/25/2025 0400 by Debi Peterson RN  Communication Enhancement Strategies:   call light answered in person   communication board used  Taken 3/25/2025 0000 by Debi Peterson RN  Communication Enhancement Strategies:   call light answered in person   communication board used  Taken 3/24/2025 2000 by Debi Peterson RN  Communication Enhancement Strategies:   call light answered in person   communication board used  Goal: Optimal Functional Ability  Outcome: Progressing  Goal: Optimal Nutrition Intake  Outcome: Progressing  Goal:  Effective Oxygenation and Ventilation  Outcome: Progressing  Intervention: Optimize Oxygenation and Ventilation  Recent Flowsheet Documentation  Taken 3/25/2025 0400 by Debi Peterson RN  Head of Bed (HOB) Positioning: HOB elevated  Taken 3/24/2025 2000 by Debi Peterson RN  Head of Bed (HOB) Positioning: HOB elevated  Goal: Improved Sensorimotor Function  Outcome: Progressing  Intervention: Optimize Range of Motion, Motor Control and Function  Recent Flowsheet Documentation  Taken 3/24/2025 2000 by Debi Peterson RN  Positioning/Transfer Devices:   pillows   in use  Range of Motion: active ROM (range of motion) encouraged  Intervention: Optimize Sensory and Perceptual Ability  Recent Flowsheet Documentation  Taken 3/25/2025 0400 by Debi Peterson RN  Pressure Reduction Techniques: weight shift assistance provided  Pressure Reduction Devices: pressure-redistributing mattress utilized  Taken 3/25/2025 0000 by Debi Peterson RN  Pressure Reduction Techniques: weight shift assistance provided  Pressure Reduction Devices: pressure-redistributing mattress utilized  Taken 3/24/2025 2000 by Debi Peterson RN  Pressure Reduction Techniques:   weight shift assistance provided   frequent weight shift encouraged  Pressure Reduction Devices: pressure-redistributing mattress utilized  Goal: Safe and Effective Swallow  Outcome: Progressing  Goal: Effective Urinary Elimination  Outcome: Progressing     Problem: Pain Acute  Goal: Optimal Pain Control and Function  Outcome: Progressing  Intervention: Optimize Psychosocial Wellbeing  Recent Flowsheet Documentation  Taken 3/25/2025 0400 by Debi Peterson RN  Supportive Measures: active listening utilized  Taken 3/25/2025 0000 by Debi Peterson RN  Supportive Measures: active listening utilized  Taken 3/24/2025 2000 by Debi Peterson RN  Supportive Measures: active listening utilized  Intervention: Prevent or Manage Pain  Recent Flowsheet  Documentation  Taken 3/25/2025 0600 by Debi Peterson, RN  Medication Review/Management: medications reviewed  Taken 3/24/2025 2000 by Debi Peterson, RN  Sensory Stimulation Regulation:   lighting decreased   care clustered  Medication Review/Management: medications reviewed   Goal Outcome Evaluation:

## 2025-03-25 NOTE — PROGRESS NOTES
[facility] MEDICINE SERVICE  DAILY PROGRESS NOTE    NAME: Lexi Gilbert  : 1950  MRN: [id number]      LOS: 7 days     PROVIDER OF SERVICE: Ro Soliman MD    Chief Complaint: Accelerated hypertension    Subjective:     Interval History:    Patient seen and evaluated at bedside. No complaints this morning. Pending appeal for rehab placement.    Treatment plan discussed with patient. All questions addressed.     Review of Systems:   Denies fevers, chills  Denies chest pain, edema  Denies shortness of breath, cough  Denies nausea, vomiting, diarrhea  Denies dysuria, hematuria    Objective:     Vital Signs  Temp:  [97.5 °F (36.4 °C)-98.5 °F (36.9 °C)] 98.1 °F (36.7 °C)  Heart Rate:  [59-73] 59  Resp:  [14-22] 14  BP: (102-129)/(54-77) 125/54   Body mass index is 44.34 kg/m².    Physical Exam   General: No acute distress, alert and oriented  CV: RRR, no peripheral edema  Pulm: No increased work of breathing  Psych: Appropriate mood and affect    Scheduled Meds   amLODIPine, 10 mg, Oral, Daily  aspirin, 81 mg, Oral, Daily  chlorthalidone, 25 mg, Oral, Daily  clopidogrel, 75 mg, Oral, Daily  [Held by provider] enoxaparin sodium, 40 mg, Subcutaneous, Q24H  escitalopram, 20 mg, Oral, Daily  hydrALAZINE, 25 mg, Oral, Q8H  levothyroxine, 175 mcg, Oral, Q AM  losartan, 100 mg, Oral, Q24H  sodium chloride, 10 mL, Intravenous, Q12H       PRN Meds     acetaminophen **OR** [DISCONTINUED] acetaminophen **OR** [DISCONTINUED] acetaminophen    senna-docusate sodium **AND** polyethylene glycol **AND** bisacodyl **AND** bisacodyl    diphenhydrAMINE-zinc acetate    ipratropium-albuterol    melatonin    ondansetron    oxyCODONE    [COMPLETED] Insert Peripheral IV **AND** sodium chloride    sodium chloride    sodium chloride   Infusions         Diagnostic Data    Results from last 7 days   Lab Units 25  0000   WBC 10*3/mm3 6.33   HEMOGLOBIN g/dL 14.1   HEMATOCRIT % 45.0   PLATELETS 10*3/mm3 280   GLUCOSE mg/dL  109*   CREATININE mg/dL 0.81   BUN mg/dL 27*   SODIUM mmol/L 139   POTASSIUM mmol/L 3.9   ANION GAP mmol/L 11.0       No radiology results for the last day    Interval results reviewed.    Assessment/Plan:     Acute infarct of lateral right thalamus  Chronic lacunar infarct of posterior left putamen  - Neurology consulted, have since signed off  - Continue aspirin, plavix, statin  - MCOT at discharge  - Will need outpatient neuro follow up  - PT/OT rec'd IRF at discharge, placement pending    Hypertensive emergency  History of hypertension  - Continue amlodipine, chlorthalidone, hydralazine, losartan    Hypothyroidism  - Continue levothyroxine    History of anxiety  History of depression  - Continue lexapro    Suspected MONIQUE  - Recommend outpatient sleep study    Treatment plan discussed with nursing staff.    VTE Prophylaxis:  Pharmacologic & mechanical VTE prophylaxis orders are present.    Code status is   Code Status and Medical Interventions: CPR (Attempt to Resuscitate); Full Support   Ordered at: 03/17/25 0559     Code Status (Patient has no pulse and is not breathing):    CPR (Attempt to Resuscitate)     Medical Interventions (Patient has pulse or is breathing):    Full Support       Plan for disposition: IRF 3/26/25    Barriers to discharge: Pending placement, appeal pending    Time: 35+ minutes     Signature: Electronically signed by Ro Soliman MD, 03/25/25, 08:06 EDT.  [facility] Hospitalist Team

## 2025-03-25 NOTE — SIGNIFICANT NOTE
At 1620 CM rec'd call from Jenn with [employer] to inform that an expedited appeal had been started and updated progress and PT/OT evals were needed. She stated that this was a time sensitive appeal. Requested items sent through [employer] communication to number provided [phone]. Updated [facility] liaisonLisa.

## 2025-03-25 NOTE — PAYOR COMM NOTE
"3/25/25   Requested Updated PT/OT and Progress Notes for Expedited Appeal    ----------------------------------------------------------------------------------------------------  Sarah Gilbert (74 y.o. Female)       Date of Birth   1950    Social Security Number       Address   [address] [place] [address] [place] [address]    Home Phone   [phone]    MRN   [id number]       Advent   [facility]iness    Marital Status                               Admission Date   3/17/2025    Admission Type   Emergency    Admitting Provider   Ro Soliman MD    Attending Provider   Ro Soliman MD    Department, Room/Bed   [facility], 249/1       Discharge Date       Discharge Disposition       Discharge Destination                                 Attending Provider: Ro Soliman MD    Allergies: No Known Allergies    Isolation: None   Infection: None   Code Status: CPR    Ht: 157.5 cm (62.01\")   Wt: 110 kg (242 lb 8.1 oz)    Admission Cmt: None   Principal Problem: Accelerated hypertension [I10]                   Active Insurance as of 3/17/2025       Primary Coverage       Payor Plan Insurance Group Employer/Plan Group    AETNA MEDICARE REPLACEMENT [place]NA [place]IC[id number]       Payor Plan Address Payor Plan Phone Number Payor Plan Fax Number Effective Dates    [address] [phone]  1/1/2024 - None Entered    [facility] [id number]         Subscriber Name Subscriber Birth Date Member ID       SARAH GILBERT 1950 [id number]               Secondary Coverage       Payor Plan Insurance Group Employer/Plan Group     FOR LIFE  FOR LIFE  SUP         Payor Plan Address Payor Plan Phone Number Payor Plan Fax Number Effective Dates    [address] [phone]  5/3/2020 - None Entered    [facility] [id number]         Subscriber Name Subscriber Birth Date Member ID       SARAH GILBERT 1950 [id number]                     Emergency Contacts       Contact " "Person (Rel.) Home Phone Work Phone Mobile Phone    Israel Gilbert (Son) [phone] -- --    CHENG GILBERT (Son) -- -- [phone]                 History & Physical        Linh Adkins PA-C at 25 1306       Attestation signed by Canelo Tracey DO at 25 2018    I have reviewed this documentation and agree.                      [facility] Medicine Services  History & Physical    Patient Name: Lexi Gilbert  : 1950  MRN: [id number]  Primary Care Physician:  Leandra Pettit MD  Date of admission: 3/17/2025  Date and Time of Service: 3/17/2025 at 1000    Subjective      Chief Complaint: left-sided weakness    History of Present Illness: Lexi Gilbert is a 74 y.o. female with a CMH of hypothyroidism, anxiety/depression, hx of stroke ~15 years ago who presented to [facility] on 3/17/2025 with  left-sided weakness.    Patient presented to the emergency department early this morning with complaint of left-sided weakness which began around 4 AM.  She states that she was watching TV when all of a sudden her left arm felt \"funny\", weak and slightly numb.  She also began having slight dizziness.  She stood up to go lay down in her bed but fell onto the bed and slid to the floor.  She states she feels like she fell because she was weak, particularly in her left leg and was off balance.  She could not get herself up. She attempted to call her son who lives with her but did not get an answer therefore she called EMS.  She says that she was not having any trouble using her phone.  She still feels slightly weak in the left side of her body.  She does not have dizziness any longer.  She denies any chest pain, shortness of breath, slurred speech, dysphagia, vision changes, syncope, near syncope, headache, fever, chills, GI symptoms, urinary symptoms, peripheral edema or other acute symptoms at this time.  She states her blood pressure runs SBP 140s to 150s at home and was surprised that her blood " pressure was extremely elevated when EMS checked it. She is not on any antihypertensives or aspirin at home.     On arrival to the ED, her blood pressure was noted to be 220/120, heart rate 83, 96% on room air.  Labs remarkable for troponin 21, repeat 31, glucose 118.  Chest x-ray showed minor linear atelectasis in the left midlung.  CT head showed no acute intracranial abnormality, moderate chronic small vessel ischemic change and chronic lacunar infarcts in the left putamen.  She was given IV Labetalol.  She is being admitted for further management.    Review of Systems   Constitutional:  Negative for chills, diaphoresis, fatigue and fever.   HENT: Negative.     Respiratory: Negative.     Cardiovascular: Negative.    Gastrointestinal: Negative.    Genitourinary: Negative.    Musculoskeletal:  Positive for gait problem.   Skin: Negative.    Neurological:  Positive for dizziness, weakness and numbness. Negative for seizures, syncope, facial asymmetry, speech difficulty, light-headedness and headaches.       Personal History     Past Medical History:   Diagnosis Date    Arthritis     right knee, sched scope    Bronchitis, chronic     CVA (cerebral infarction)     5 years ago    Hypertension     Hypothyroidism     IBS (irritable bowel syndrome)     Stroke 12/2011       Past Surgical History:   Procedure Laterality Date    BELPHAROPTOSIS REPAIR Bilateral 2003    HYSTERECTOMY      KNEE ARTHROSCOPY Right 5/20/2016    Procedure: KNEE ARTHROSCOPY debride of mmt  chondraplasty mfc/ lfc and patella;  Surgeon: Danie Crowell MD;  Location: [facility];  Service:     MOUTH SURGERY         Family History: family history includes Anesthesia problems in her sister; Cancer in her sister; Hypertension in her father. Otherwise pertinent FHx was reviewed and not pertinent to current issue.    Social History:  reports that she has been smoking cigarettes. She has a 40 pack-year smoking history. She has never used smokeless tobacco. She  reports that she does not drink alcohol and does not use drugs.    Home Medications:  Prior to Admission Medications       Prescriptions Last Dose Informant Patient Reported? Taking?    escitalopram (LEXAPRO) 20 MG tablet 3/16/2025  Yes Yes    Take 1 tablet by mouth Daily.    levothyroxine (SYNTHROID, LEVOTHROID) 175 MCG tablet 3/16/2025  No Yes    TAKE ONE TABLET BY MOUTH DAILY              Allergies:  No Known Allergies    Objective      Vitals:   Temp:  [97.4 °F (36.3 °C)-98 °F (36.7 °C)] 97.4 °F (36.3 °C)  Heart Rate:  [57-88] 63  Resp:  [16-22] 22  BP: (127-238)/() 157/73  Body mass index is 44.56 kg/m².  Physical Exam  Constitutional:       Appearance: Normal appearance.   HENT:      Head: Normocephalic and atraumatic.      Mouth/Throat:      Mouth: Mucous membranes are moist.   Eyes:      Extraocular Movements: Extraocular movements intact.   Cardiovascular:      Rate and Rhythm: Normal rate and regular rhythm.   Pulmonary:      Effort: Pulmonary effort is normal.      Breath sounds: Normal breath sounds.   Abdominal:      Palpations: Abdomen is soft.      Tenderness: There is no abdominal tenderness.   Musculoskeletal:         General: Normal range of motion.      Cervical back: Normal range of motion.      Right lower leg: No edema.      Left lower leg: No edema.   Skin:     General: Skin is warm.   Neurological:      General: No focal deficit present.      Mental Status: She is alert and oriented to person, place, and time.         Diagnostic Data:  Lab Results (last 24 hours)       Procedure Component Value Units Date/Time    Basic Metabolic Panel [[id number]]  (Abnormal) Collected: 03/17/25 0615    Specimen: Blood Updated: 03/17/25 0642     Glucose 134 mg/dL      BUN 19 mg/dL      Creatinine 0.70 mg/dL      Sodium 142 mmol/L      Potassium 4.0 mmol/L      Chloride 107 mmol/L      CO2 24.1 mmol/L      Calcium 9.5 mg/dL      BUN/Creatinine Ratio 27.1     Anion Gap 10.9 mmol/L      eGFR 89.8  mL/min/1.73     Narrative:      GFR Categories in Chronic Kidney Disease (CKD)      GFR Category          GFR (mL/min/1.73)    Interpretation  G1                     90 or greater         Normal or high (1)  G2                      60-89                Mild decrease (1)  G3a                   45-59                Mild to moderate decrease  G3b                   30-44                Moderate to severe decrease  G4                    15-29                Severe decrease  G5                    14 or less           Kidney failure          (1)In the absence of evidence of kidney disease, neither GFR category G1 or G2 fulfill the criteria for CKD.    eGFR calculation 2021 CKD-EPI creatinine equation, which does not include race as a factor    High Sensitivity Troponin T 1Hr [[id number]]  (Abnormal) Collected: 03/17/25 0505    Specimen: Blood Updated: 03/17/25 0532     HS Troponin T 31 ng/L      Troponin T Numeric Delta 10 ng/L      Troponin T % Delta 48    Narrative:      High Sensitive Troponin T Reference Range:  <14.0 ng/L- Negative Female for AMI  <22.0 ng/L- Negative Male for AMI  >=14 - Abnormal Female indicating possible myocardial injury.  >=22 - Abnormal Male indicating possible myocardial injury.   Clinicians would have to utilize clinical acumen, EKG, Troponin, and serial changes to determine if it is an Acute Myocardial Infarction or myocardial injury due to an underlying chronic condition.         Comprehensive Metabolic Panel [[id number]]  (Abnormal) Collected: 03/17/25 0354    Specimen: Blood Updated: 03/17/25 0427     Glucose 118 mg/dL      BUN 19 mg/dL      Creatinine 0.67 mg/dL      Sodium 142 mmol/L      Potassium 4.3 mmol/L      Comment: Slight hemolysis detected by analyzer. Result may be falsely elevated.        Chloride 107 mmol/L      CO2 23.5 mmol/L      Calcium 9.8 mg/dL      Total Protein 6.7 g/dL      Albumin 4.1 g/dL      ALT (SGPT) 11 U/L      AST (SGOT) 19 U/L      Alkaline Phosphatase 81 U/L       Total Bilirubin 0.3 mg/dL      Globulin 2.6 gm/dL      A/G Ratio 1.6 g/dL      BUN/Creatinine Ratio 28.4     Anion Gap 11.5 mmol/L      eGFR 90.7 mL/min/1.73     Narrative:      GFR Categories in Chronic Kidney Disease (CKD)      GFR Category          GFR (mL/min/1.73)    Interpretation  G1                     90 or greater         Normal or high (1)  G2                      60-89                Mild decrease (1)  G3a                   45-59                Mild to moderate decrease  G3b                   30-44                Moderate to severe decrease  G4                    15-29                Severe decrease  G5                    14 or less           Kidney failure          (1)In the absence of evidence of kidney disease, neither GFR category G1 or G2 fulfill the criteria for CKD.    eGFR calculation 2021 CKD-EPI creatinine equation, which does not include race as a factor    High Sensitivity Troponin T [[id number]]  (Abnormal) Collected: 03/17/25 0354    Specimen: Blood Updated: 03/17/25 0424     HS Troponin T 21 ng/L     Narrative:      High Sensitive Troponin T Reference Range:  <14.0 ng/L- Negative Female for AMI  <22.0 ng/L- Negative Male for AMI  >=14 - Abnormal Female indicating possible myocardial injury.  >=22 - Abnormal Male indicating possible myocardial injury.   Clinicians would have to utilize clinical acumen, EKG, Troponin, and serial changes to determine if it is an Acute Myocardial Infarction or myocardial injury due to an underlying chronic condition.         Extra Tubes [[id number]] Collected: 03/17/25 0354    Specimen: Blood, Venous Line Updated: 03/17/25 0415    Narrative:      The following orders were created for panel order Extra Tubes.  Procedure                               Abnormality         Status                     ---------                               -----------         ------                     Gold Top - SST[[id number]]                                   Final result                Light Blue Top[[id number]]                                   Final result                 Please view results for these tests on the individual orders.    Gold Top - SST [[id number]] Collected: 03/17/25 0354    Specimen: Blood Updated: 03/17/25 0415     Extra Tube Hold for add-ons.     Comment: Auto resulted.       Light Blue Top [[id number]] Collected: 03/17/25 0354    Specimen: Blood Updated: 03/17/25 0415     Extra Tube Hold for add-ons.     Comment: Auto resulted       CBC & Differential [[id number]]  (Abnormal) Collected: 03/17/25 0354    Specimen: Blood Updated: 03/17/25 0402    Narrative:      The following orders were created for panel order CBC & Differential.  Procedure                               Abnormality         Status                     ---------                               -----------         ------                     CBC Auto Differential[[id number]]        Abnormal            Final result                 Please view results for these tests on the individual orders.    CBC Auto Differential [[id number]]  (Abnormal) Collected: 03/17/25 0354    Specimen: Blood Updated: 03/17/25 0402     WBC 8.83 10*3/mm3      RBC 5.13 10*6/mm3      Hemoglobin 15.0 g/dL      Hematocrit 47.7 %      MCV 93.0 fL      MCH 29.2 pg      MCHC 31.4 g/dL      RDW 13.2 %      RDW-SD 45.5 fl      MPV 10.2 fL      Platelets 253 10*3/mm3      Neutrophil % 76.7 %      Lymphocyte % 15.5 %      Monocyte % 5.5 %      Eosinophil % 1.8 %      Basophil % 0.3 %      Immature Grans % 0.2 %      Neutrophils, Absolute 6.76 10*3/mm3      Lymphocytes, Absolute 1.37 10*3/mm3      Monocytes, Absolute 0.49 10*3/mm3      Eosinophils, Absolute 0.16 10*3/mm3      Basophils, Absolute 0.03 10*3/mm3      Immature Grans, Absolute 0.02 10*3/mm3      nRBC 0.0 /100 WBC     POC Glucose Once [[id number]]  (Abnormal) Collected: 03/17/25 0345    Specimen: Blood Updated: 03/17/25 0347     Glucose 128 mg/dL      Comment: Serial Number:  [id number]:  [id number]                Imaging Results (Last 24 Hours)       Procedure Component Value Units Date/Time    CT Angiogram Carotids [[id number]] Resulted: 03/17/25 1257     Updated: 03/17/25 1257    CT Angiogram Head [[id number]] Resulted: 03/17/25 1257     Updated: 03/17/25 1257    CT Head Without Contrast [[id number]] Collected: 03/17/25 0511     Updated: 03/17/25 0514    Narrative:      CT HEAD WO CONTRAST    Date of Exam: 3/17/2025 4:47 AM EDT    Indication: Elevated blood pressure headache numbness left arm near syncope.    Comparison: None available.    Technique: Axial CT images were obtained of the head without contrast administration.  Coronal reconstructions were performed.  Automated exposure control and iterative reconstruction methods were used.      Findings:  There is a chronic lacunar infarct in the posterior left putamen. There is moderate patchy white matter hypoattenuation. No acute intracranial hemorrhage. Cortical gray-white differentiation is normal. Mild intracranial atherosclerotic calcification.   Small mastoid effusions. There is a sphenoid sinus mucus retention cyst. Orbits are symmetric. No mass effect, midline shift or abnormal extra-axial collection.      Impression:      Impression:  1.No acute intracranial abnormality.  2.Moderate chronic small vessel ischemic change and chronic lacunar infarct in the left putamen.  3.Small mastoid effusions.          Electronically Signed: Femi Infante MD    3/17/2025 5:12 AM EDT    Workstation ID: [id number]    XR Chest 1 View [[id number]] Collected: 03/17/25 0418     Updated: 03/17/25 0421    Narrative:      XR CHEST 1 VW    Date of Exam: 3/17/2025 4:03 AM EDT    Indication: General weakness    Comparison: 5/19/2016.    Findings:  There is minor linear atelectasis in the periphery of the left midlung. There is no pneumothorax, pleural effusion or focal airspace consolidation. Heart size and pulmonary vasculature appear within normal  limits. Regional bones appear intact.      Impression:      Impression:  Minor linear atelectasis in the left midlung.          Electronically Signed: Femi Infante MD    3/17/2025 4:19 AM EDT    Workstation ID: [id number]              Assessment & Plan        This is a 74 y.o. female with:    Active and Resolved Problems  Active Hospital Problems    Diagnosis  POA    **Accelerated hypertension [I10]  Yes      Resolved Hospital Problems   No resolved problems to display.       Stroke-like symptoms  -Patient presented with left arm and left leg weakness since around 4am. She has a hx of stroke and states her symptoms are similar to her prior stroke. tPA was not given on arrival.   -Reviewed CTH - chronic small vessel ischemic disease and chronic lacunar infarct. No acute findings.   -Will obtain stat CTA head and carotids  -MRI ordered and pending at this time  -NIHSS BID and neuro-checks Q4H  -Start aspirin  -OT/PT consult  -ECHO ordered and pending  -Telemetry  -Allow permissive hypertension for now pending MRI  -Consult neurology -- appreciate further recommendations    Hypertensive emergency  -Initial blood pressure 220/120.  She was given one dose of IV Labetalol.  Blood pressure trending down, currently 151/79.  Will hold additional antihypertensive agents for now to allow for permissive hypertension.  -Troponin trending up 21->31, likely secondary to severe hypertension. She is without chest pain.  EKG reveals LBBB which is chronic, has been present since at least 2016.    Anxiety/depression  -Continue lexapro    Hypothyroidism  -Continue levothyroxine. Check TSH/FT4.    VTE Prophylaxis:  Mechanical VTE prophylaxis orders are present.        The patient desires to be as follows:    CODE STATUS:    Code Status (Patient has no pulse and is not breathing): CPR (Attempt to Resuscitate)  Medical Interventions (Patient has pulse or is breathing): Full Support        Israel Gilbert, who can be contacted at [phone],  is the designated person to make medical decisions on the patient's behalf if She is incapable of doing so. This was clarified with patient and/or next of kin on 3/17/2025 during the course of this H&P.    Admission Status:  I believe this patient meets obs status.    Expected Length of Stay: 1 day    PDMP and Medication Dispenses via Sidebar reviewed and consistent with patient reported medications.    I discussed the patient's findings and my recommendations with patient and nursing staff.      Signature:     This document has been electronically signed by Linh Adkins PA-C on 2025 13:06 EDT   [facility] Team    Electronically signed by Canelo Tracey DO at 25 2018          Physician Progress Notes (last 24 hours)        Ro Soliman MD at 25 0806              [facility] MEDICINE SERVICE  DAILY PROGRESS NOTE    NAME: Lexi Gilbert  : 1950  MRN: [id number]      LOS: 7 days     PROVIDER OF SERVICE: Ro Soliman MD    Chief Complaint: Accelerated hypertension    Subjective:     Interval History:    Patient seen and evaluated at bedside. No complaints this morning. Pending appeal for rehab placement.    Treatment plan discussed with patient. All questions addressed.     Review of Systems:   Denies fevers, chills  Denies chest pain, edema  Denies shortness of breath, cough  Denies nausea, vomiting, diarrhea  Denies dysuria, hematuria    Objective:     Vital Signs  Temp:  [97.5 °F (36.4 °C)-98.5 °F (36.9 °C)] 98.1 °F (36.7 °C)  Heart Rate:  [59-73] 59  Resp:  [14-22] 14  BP: (102-129)/(54-77) 125/54   Body mass index is 44.34 kg/m².    Physical Exam   General: No acute distress, alert and oriented  CV: RRR, no peripheral edema  Pulm: No increased work of breathing  Psych: Appropriate mood and affect    Scheduled Meds   amLODIPine, 10 mg, Oral, Daily  aspirin, 81 mg, Oral, Daily  chlorthalidone, 25 mg, Oral, Daily  clopidogrel, 75 mg, Oral, Daily  [Held  by provider] enoxaparin sodium, 40 mg, Subcutaneous, Q24H  escitalopram, 20 mg, Oral, Daily  hydrALAZINE, 25 mg, Oral, Q8H  levothyroxine, 175 mcg, Oral, Q AM  losartan, 100 mg, Oral, Q24H  sodium chloride, 10 mL, Intravenous, Q12H       PRN Meds     acetaminophen **OR** [DISCONTINUED] acetaminophen **OR** [DISCONTINUED] acetaminophen    senna-docusate sodium **AND** polyethylene glycol **AND** bisacodyl **AND** bisacodyl    diphenhydrAMINE-zinc acetate    ipratropium-albuterol    melatonin    ondansetron    oxyCODONE    [COMPLETED] Insert Peripheral IV **AND** sodium chloride    sodium chloride    sodium chloride   Infusions         Diagnostic Data    Results from last 7 days   Lab Units 03/25/25  0000   WBC 10*3/mm3 6.33   HEMOGLOBIN g/dL 14.1   HEMATOCRIT % 45.0   PLATELETS 10*3/mm3 280   GLUCOSE mg/dL 109*   CREATININE mg/dL 0.81   BUN mg/dL 27*   SODIUM mmol/L 139   POTASSIUM mmol/L 3.9   ANION GAP mmol/L 11.0       No radiology results for the last day    Interval results reviewed.    Assessment/Plan:     Acute infarct of lateral right thalamus  Chronic lacunar infarct of posterior left putamen  - Neurology consulted, have since signed off  - Continue aspirin, plavix, statin  - MCOT at discharge  - Will need outpatient neuro follow up  - PT/OT rec'd IRF at discharge, placement pending    Hypertensive emergency  History of hypertension  - Continue amlodipine, chlorthalidone, hydralazine, losartan    Hypothyroidism  - Continue levothyroxine    History of anxiety  History of depression  - Continue lexapro    Suspected MONIQUE  - Recommend outpatient sleep study    Treatment plan discussed with nursing staff.    VTE Prophylaxis:  Pharmacologic & mechanical VTE prophylaxis orders are present.    Code status is   Code Status and Medical Interventions: CPR (Attempt to Resuscitate); Full Support   Ordered at: 03/17/25 0559     Code Status (Patient has no pulse and is not breathing):    CPR (Attempt to Resuscitate)      Medical Interventions (Patient has pulse or is breathing):    Full Support       Plan for disposition: IRF 3/26/25    Barriers to discharge: Pending placement, appeal pending    Time: 35+ minutes     Signature: Electronically signed by Ro Soliman MD, 03/25/25, 08:06 EDT.  [facility] Hospitalist Team      Electronically signed by Ro Soliman MD at 03/25/25 1133       Consult Notes (last 24 hours)  Notes from 03/24/25 1619 through 03/25/25 1619   No notes of this type exist for this encounter.          Physical Therapy Notes (last 48 hours)        Veronica La, PTA at 03/23/25 1620  Version 1 of 1         Subjective: Pt agreeable to therapeutic plan of care.Pt stated they gave her laxative last night and she's had multiple times of being incontinent of BM today. Agreed to stand using sarita stedy    Objective:     Precautions - falls    Bed mobility - Min-A  Transfers - Min-A to CTS using sarita stedy 3x approx 2 mins each  Ambulation -  feet N/A or Not attempted.    Therapeutic Exercise - 10 Reps B LE AROM unsupported sitting / EOB    Vitals: WNL    Pain: 0 VAS     Education: Provided education on the importance of mobility in the acute care setting, Verbal/Tactile Cues, and Transfer Training    Assessment: Lexi Gilbert presents with functional mobility impairments which indicate the need for skilled intervention. Tolerating session today without incident. Pt presents with L extremity weakness, tends to veer to L when fatigued. Plans on acute rehab at [place]. Will continue to follow and progress as tolerated.     Plan/Recommendations:   If medically appropriate, High Intensity Therapy recommended post-acute care. This is recommended as therapy feels the patient would require 5-6 days per week, 2-3 hours per day. At this time, inpatient rehabilitation (acute rehab) would be the first choice and SNF would be second. Pt requires no DME at discharge.     Pt desires Inpatient Rehabilitation placement at  discharge. Pt cooperative; agreeable to therapeutic recommendations and plan of care.         Basic Mobility 6-click:  Rollin = Total, A lot = 2, A little = 3; 4 = None  Supine>Sit:   1 = Total, A lot = 2, A little = 3; 4 = None   Sit>Stand with arms:  1 = Total, A lot = 2, A little = 3; 4 = None  Bed>Chair:   1 = Total, A lot = 2, A little = 3; 4 = None  Ambulate in room:  1 = Total, A lot = 2, A little = 3; 4 = None  3-5 Steps with railin = Total, A lot = 2, A little = 3; 4 = None  Score: 12    Modified [place]: 4 = Moderately severe disability (Unable to attend to own bodily needs without assistance, and unable to walk unassisted)     Post-Tx Position: Supine with HOB Elevated, Alarms activated, and Call light and personal items within reach  PPE: gloves    Therapy Charges for Today       Code Description Service Date Service Provider Modifiers Qty    97797870442  PT NEUROMUSC RE EDUCATION EA 15 MIN 3/23/2025 Veronica La PTA GP 1    [id number]  PT THERAPEUTIC ACT EA 15 MIN 3/23/2025 Veronica La, MOON GP 1    [id number]  PT THER PROC EA 15 MIN 3/23/2025 Veronica La PTA GP 1           PT Charges       Row Name 25 1718             Time Calculation    Start Time 1554  -      Stop Time 1620  -      Time Calculation (min) 26 min  -      PT Received On 25  -      PT - Next Appointment 25  -         Time Calculation- PT    Total Timed Code Minutes- PT 26 minute(s)  -                User Key  (r) = Recorded By, (t) = Taken By, (c) = Cosigned By      Initials Name Provider Type     Veronica La PTA Physical Therapist Assistant                     Electronically signed by Veronica La PTA at 25 1722       Veronica La PTA at 25 1620  Version 1 of 1          Assessment: Lexi Gilbert presents with functional mobility impairments which indicate the need for skilled intervention. Tolerating session today without incident.  Pt presents with L extremity weakness, tends to veer to L when fatigued. Plans on acute rehab at [place]. Will continue to follow and progress as tolerated.                             Electronically signed by Veronica La PTA at 03/23/25 1723          Occupational Therapy Notes (last 48 hours)        Ai Cedillo, OT at 03/25/25 1557          Goal Outcome Evaluation:   Assessment: Lexi Gilbert presents with ADL impairments affecting function including balance, coordination, endurance / activity tolerance, grasp, range of motion (ROM), and strength. Pt demo improved ADL transfer skills & RUE strength this date. Pt able to maintain an improved grasp of the RW using her left hand with inc strength & able to participate with room mobility with assistance of 2 skilled therapist due to LLE buckling. Pt able to advance her LUE exercise tolerance to tolerate resistive exercise with t-band.  She continues to be an excellent candidate for inpt rehab. Demonstrated functioning below baseline abilities indicate the need for continued skilled intervention while inpatient. Tolerating session today without incident. Will continue to follow and progress as tolerated.     Plan/Recommendations:   High Intensity Therapy recommended post-acute care. This is recommended as therapy feels the patient would require 5-6 days per week, 2-3 hours per day. At this time, inpatient rehabilitation (acute rehab) would be the first choice and SNF would be second.. Pt requires no DME at discharge.     Pt desires Inpatient Rehabilitation placement at discharge. Pt cooperative; agreeable to therapeutic recommendations and plan of care.                        Electronically signed by Ai Cedillo, OT at 03/25/25 6050       Ai Cedillo, OT at 03/25/25 8775          Subjective: Pt agreeable to therapeutic plan of care. Pt stating she feels a little better today.  Cognition: oriented to Person, Place, Time, and  Situation    Objective:     Precautions - high fall risk with LLE buckling.     Bed Mobility: Mod-A and Assist x 2 for sit>supine.   Functional Transfers: Min-A X2 for sit<>stand.      Balance: sitting EOB static & dynamic = SBA; standing = min A X2, dynamic = mod A x2 (LLE buckling intermittently)    Functional Ambulation: Mod-A and Assist x 2 with assistance for LLE at knee to prevent buckling.     Feeding: Modified-Independent. Minimal set-up.   ADL Position: supported sitting  ADL Comments:     Therapeutic Exercise - 10 Reps L Lower Extremity and L Upper Extremity AROM & resistive exercises using yellow theraband.  lying supine and supported sitting / chair    Vitals: WNL    Pain: 0  Education: Provided education on the importance of mobility in the acute care setting, Verbal/Tactile Cues, ADL training, and Transfer Training, HEP with yellow theraband.     Assessment: Lexi Gilbert presents with ADL impairments affecting function including balance, coordination, endurance / activity tolerance, grasp, range of motion (ROM), and strength. Pt demo improved ADL transfer skills & RUE strength this date. Pt able to maintain an improved grasp of the RW using her left hand with inc strength & able to participate with room mobility with assistance of 2 skilled therapist due to LLE buckling. Pt able to advance her LUE exercise tolerance to tolerate resistive exercise with t-band.  She continues to be an excellent candidate for inpt rehab. Demonstrated functioning below baseline abilities indicate the need for continued skilled intervention while inpatient. Tolerating session today without incident. Will continue to follow and progress as tolerated.     Plan/Recommendations:   High Intensity Therapy recommended post-acute care. This is recommended as therapy feels the patient would require 5-6 days per week, 2-3 hours per day. At this time, inpatient rehabilitation (acute rehab) would be the first choice and SNF would be  second.. Pt requires no DME at discharge.     Pt desires Inpatient Rehabilitation placement at discharge. Pt cooperative; agreeable to therapeutic recommendations and plan of care.     Modified [place]: 4 = Moderately severe disability (Unable to attend to own bodily needs without assistance, and unable to walk unassisted)     Post-Tx Position: Supine with HOB Elevated, Alarms activated, and Call light and personal items within reach  PPE: gloves       Time Calculation- OT       Row Name 03/25/25 1546             Time Calculation- OT    OT Start Time 1112  -DT      OT Stop Time 1133  -DT      OT Time Calculation (min) 21 min  -DT      Total Timed Code Minutes- OT 21 minute(s)  -DT      OT Received On 03/25/25  -DT      OT - Next Appointment 03/26/25  -DT                User Key  (r) = Recorded By, (t) = Taken By, (c) = Cosigned By      Initials Name Provider Type    DT Ai Cedillo OT Occupational Therapist                      Electronically signed by Ai Cedillo OT at 03/25/25 4207

## 2025-03-25 NOTE — THERAPY TREATMENT NOTE
Subjective: Pt agreeable to therapeutic plan of care. Pt wanting to get back to bed this date.      Objective:      Precautions - desaturates with activity, falls     Bed mobility - Mod-A x2 for supine to sit and get positioned in the bed  Transfers - Min x2 for come to stand  Balance: dynamic standing: ModA x2  Ambulation - ModA x2 for 7ft with RW, L knee buckling toward end of bout.     There ex: Pt completed seated marches, LAQs, and ankle PF/DF x10 LLE with yellow resistance band     Vitals: WNL       Pain: 4 [place]-Baker Location: tailbone  Intervention for pain: Repositioned     Education: Provided education on the importance of mobility in the acute care setting     Assessment: Lexi Gilbert Lexi Gilbert 74 y.o. female with a CMH of hypothyroidism, anxiety/depression, hx of stroke ~15 years ago who presented on 3/17 with left-sided weakness. Per Neuro, pt with Chronic lacunar infarct in the posterior left putamen and Acute infarct within the lateral right thalamus likely 2/2 HTN. Up in chair upon arrival. Pt able to complete seated LE exerceses with yellow resistance band this date. Pt required Marie x2 to come to stand from chair and ModA x2 for short distand ambulation to bed. Pt with L knee buckling that required Marie to block toward end of bout. Pt required ModA x2 for sit to supine and to reposition in bed. OT continued with PT after exit. Tolerating session today without incident. Will continue to follow and progress as tolerated.      Plan/Recommendations:   If medically appropriate, High Intensity Therapy recommended post-acute care. This is recommended as therapy feels the patient would require 5-6 days per week, 2-3 hours per day. At this time, inpatient rehabilitation (acute rehab) would be the first choice and SNF would be second. Pt requires no DME at discharge.      Pt desires Inpatient Rehabilitation placement at discharge. Pt cooperative; agreeable to therapeutic recommendations and plan of  care.      Modified [place]: 4 = Moderately severe disability (Unable to attend to own bodily needs without assistance, and unable to walk unassisted)      Post-Tx Position: Supine with HOB Elevated, Alarms activated, and Call light and personal items within reach  PPE: gloves and surgical mask    Therapy Charges for Today       Code Description Service Date Service Provider Modifiers Qty    [id number]  PT THERAPEUTIC ACT EA 15 MIN 3/25/2025 Tabatha Gunn, PT Student GP 1    [id number]  GAIT TRAINING EA 15 MIN 3/25/2025 Tabatha Gunn, PT Student GP 1           PT Charges       Row Name 03/25/25 1702             Time Calculation    Start Time 1112  -SS (r) AD (t) SS (c)      Stop Time 1128  -SS (r) AD (t) SS (c)      Time Calculation (min) 16 min  -SS (r) AD (t)      PT Received On 03/25/25  -SS (r) AD (t) SS (c)      PT - Next Appointment 03/26/25  -SS (r) AD (t) SS (c)         Time Calculation- PT    Total Timed Code Minutes- PT 16 minute(s)  -SS (r) AD (t) SS (c)                User Key  (r) = Recorded By, (t) = Taken By, (c) = Cosigned By      Initials Name Provider Type    Michelle Molina, PT Physical Therapist    Tbaatha Yanez, PT Student PT Student

## 2025-03-25 NOTE — THERAPY TREATMENT NOTE
Subjective: Pt agreeable to therapeutic plan of care. Pt stating she feels a little better today.  Cognition: oriented to Person, Place, Time, and Situation    Objective:     Precautions - high fall risk with LLE buckling.     Bed Mobility: Mod-A and Assist x 2 for sit>supine.   Functional Transfers: Min-A X2 for sit<>stand.      Balance: sitting EOB static & dynamic = SBA; standing = min A X2, dynamic = mod A x2 (LLE buckling intermittently)    Functional Ambulation: Mod-A and Assist x 2 with assistance for LLE at knee to prevent buckling.     Feeding: Modified-Independent. Minimal set-up.   ADL Position: supported sitting  ADL Comments:     Therapeutic Exercise - 10 Reps L Lower Extremity and L Upper Extremity AROM & resistive exercises using yellow theraband.  lying supine and supported sitting / chair    Vitals: WNL    Pain: 0  Education: Provided education on the importance of mobility in the acute care setting, Verbal/Tactile Cues, ADL training, and Transfer Training, HEP with yellow theraband.     Assessment: Lexi Gilbert presents with ADL impairments affecting function including balance, coordination, endurance / activity tolerance, grasp, range of motion (ROM), and strength. Pt demo improved ADL transfer skills & RUE strength this date. Pt able to maintain an improved grasp of the RW using her left hand with inc strength & able to participate with room mobility with assistance of 2 skilled therapist due to LLE buckling. Pt able to advance her LUE exercise tolerance to tolerate resistive exercise with t-band.  She continues to be an excellent candidate for inpt rehab. Demonstrated functioning below baseline abilities indicate the need for continued skilled intervention while inpatient. Tolerating session today without incident. Will continue to follow and progress as tolerated.     Plan/Recommendations:   High Intensity Therapy recommended post-acute care. This is recommended as therapy feels the patient  would require 5-6 days per week, 2-3 hours per day. At this time, inpatient rehabilitation (acute rehab) would be the first choice and SNF would be second.. Pt requires no DME at discharge.     Pt desires Inpatient Rehabilitation placement at discharge. Pt cooperative; agreeable to therapeutic recommendations and plan of care.     Modified [place]: 4 = Moderately severe disability (Unable to attend to own bodily needs without assistance, and unable to walk unassisted)     Post-Tx Position: Supine with HOB Elevated, Alarms activated, and Call light and personal items within reach  PPE: gloves       Time Calculation- OT       Row Name 03/25/25 1546             Time Calculation- OT    OT Start Time 1112  -DT      OT Stop Time 1133  -DT      OT Time Calculation (min) 21 min  -DT      Total Timed Code Minutes- OT 21 minute(s)  -DT      OT Received On 03/25/25  -DT      OT - Next Appointment 03/26/25  -DT                User Key  (r) = Recorded By, (t) = Taken By, (c) = Cosigned By      Initials Name Provider Type    Ai Norton, OT Occupational Therapist                     CO2 10

## 2025-03-25 NOTE — PLAN OF CARE
Goal Outcome Evaluation:   Assessment: Lexi Gilbert presents with ADL impairments affecting function including balance, coordination, endurance / activity tolerance, grasp, range of motion (ROM), and strength. Pt demo improved ADL transfer skills & RUE strength this date. Pt able to maintain an improved grasp of the RW using her left hand with inc strength & able to participate with room mobility with assistance of 2 skilled therapist due to LLE buckling. Pt able to advance her LUE exercise tolerance to tolerate resistive exercise with t-band.  She continues to be an excellent candidate for inpt rehab. Demonstrated functioning below baseline abilities indicate the need for continued skilled intervention while inpatient. Tolerating session today without incident. Will continue to follow and progress as tolerated.     Plan/Recommendations:   High Intensity Therapy recommended post-acute care. This is recommended as therapy feels the patient would require 5-6 days per week, 2-3 hours per day. At this time, inpatient rehabilitation (acute rehab) would be the first choice and SNF would be second.. Pt requires no DME at discharge.     Pt desires Inpatient Rehabilitation placement at discharge. Pt cooperative; agreeable to therapeutic recommendations and plan of care.

## 2025-03-25 NOTE — PLAN OF CARE
Problem: Adult Inpatient Plan of Care  Goal: Plan of Care Review  Outcome: Progressing  Goal: Patient-Specific Goal (Individualized)  Outcome: Progressing  Goal: Absence of Hospital-Acquired Illness or Injury  Outcome: Progressing  Intervention: Identify and Manage Fall Risk  Recent Flowsheet Documentation  Taken 3/25/2025 1600 by Ro Salmon RN  Safety Promotion/Fall Prevention: safety round/check completed  Taken 3/25/2025 1555 by Ro Salmon RN  Safety Promotion/Fall Prevention:   assistive device/personal items within reach   clutter free environment maintained   fall prevention program maintained   nonskid shoes/slippers when out of bed   room organization consistent   safety round/check completed  Taken 3/25/2025 1400 by Ro Salmon RN  Safety Promotion/Fall Prevention: safety round/check completed  Intervention: Prevent Skin Injury  Recent Flowsheet Documentation  Taken 3/25/2025 1555 by Ro Salmon RN  Body Position: weight shifting  Intervention: Prevent Infection  Recent Flowsheet Documentation  Taken 3/25/2025 1555 by Ro Salmon RN  Infection Prevention: hand hygiene promoted  Goal: Optimal Comfort and Wellbeing  Outcome: Progressing  Intervention: Monitor Pain and Promote Comfort  Recent Flowsheet Documentation  Taken 3/25/2025 1555 by Ro Salmon RN  Pain Management Interventions: pain medication given  Goal: Readiness for Transition of Care  Outcome: Progressing   Goal Outcome Evaluation:      Patient did not have any new tests or procedures. The patient remains on room air. The patient complained of lower back pain and was given PRN pain medication. See MAR. Will continue to monitor.

## 2025-03-26 ENCOUNTER — APPOINTMENT (OUTPATIENT)
Dept: RESPIRATORY THERAPY | Facility: HOSPITAL | Age: 75
End: 2025-03-26
Payer: MEDICARE

## 2025-03-26 ENCOUNTER — READMISSION MANAGEMENT (OUTPATIENT)
Dept: CALL CENTER | Facility: HOSPITAL | Age: 75
End: 2025-03-26
Payer: OTHER GOVERNMENT

## 2025-03-26 VITALS
RESPIRATION RATE: 21 BRPM | TEMPERATURE: 98 F | HEIGHT: 62 IN | OXYGEN SATURATION: 96 % | DIASTOLIC BLOOD PRESSURE: 55 MMHG | HEART RATE: 55 BPM | BODY MASS INDEX: 44.63 KG/M2 | SYSTOLIC BLOOD PRESSURE: 133 MMHG | WEIGHT: 242.51 LBS

## 2025-03-26 RX ORDER — CHLORTHALIDONE 25 MG/1
25 TABLET ORAL DAILY
Qty: 30 TABLET | Refills: 0 | Status: SHIPPED | OUTPATIENT
Start: 2025-03-27 | End: 2025-04-26

## 2025-03-26 RX ORDER — LOSARTAN POTASSIUM 100 MG/1
100 TABLET ORAL
Qty: 30 TABLET | Refills: 0 | Status: SHIPPED | OUTPATIENT
Start: 2025-03-27 | End: 2025-04-26

## 2025-03-26 RX ORDER — ASPIRIN 81 MG/1
81 TABLET ORAL DAILY
Qty: 30 TABLET | Refills: 0 | Status: SHIPPED | OUTPATIENT
Start: 2025-03-27 | End: 2025-04-26

## 2025-03-26 RX ORDER — AMLODIPINE BESYLATE 10 MG/1
10 TABLET ORAL DAILY
Qty: 30 TABLET | Refills: 0 | Status: SHIPPED | OUTPATIENT
Start: 2025-03-27 | End: 2025-04-26

## 2025-03-26 RX ORDER — CLOPIDOGREL BISULFATE 75 MG/1
75 TABLET ORAL DAILY
Qty: 30 TABLET | Refills: 0 | Status: SHIPPED | OUTPATIENT
Start: 2025-03-27 | End: 2025-04-26

## 2025-03-26 RX ORDER — HYDRALAZINE HYDROCHLORIDE 25 MG/1
25 TABLET, FILM COATED ORAL EVERY 8 HOURS SCHEDULED
Qty: 90 TABLET | Refills: 0 | Status: SHIPPED | OUTPATIENT
Start: 2025-03-26 | End: 2025-04-25

## 2025-03-26 RX ADMIN — DIPHENHYDRAMINE HYDROCHLORIDE, ZINC ACETATE 1 APPLICATION: 2; .1 CREAM TOPICAL at 03:05

## 2025-03-26 RX ADMIN — AMLODIPINE BESYLATE 10 MG: 5 TABLET ORAL at 09:09

## 2025-03-26 RX ADMIN — ACETAMINOPHEN 650 MG: 325 TABLET, FILM COATED ORAL at 09:10

## 2025-03-26 RX ADMIN — HYDRALAZINE HYDROCHLORIDE 25 MG: 25 TABLET ORAL at 05:35

## 2025-03-26 RX ADMIN — LOSARTAN POTASSIUM 100 MG: 50 TABLET, FILM COATED ORAL at 09:09

## 2025-03-26 RX ADMIN — CLOPIDOGREL BISULFATE 75 MG: 75 TABLET ORAL at 09:10

## 2025-03-26 RX ADMIN — CHLORTHALIDONE 25 MG: 25 TABLET ORAL at 09:10

## 2025-03-26 RX ADMIN — Medication 10 ML: at 09:10

## 2025-03-26 RX ADMIN — HYDRALAZINE HYDROCHLORIDE 25 MG: 25 TABLET ORAL at 16:12

## 2025-03-26 RX ADMIN — LEVOTHYROXINE SODIUM 175 MCG: 175 TABLET ORAL at 05:35

## 2025-03-26 RX ADMIN — ESCITALOPRAM 20 MG: 10 TABLET, FILM COATED ORAL at 09:10

## 2025-03-26 RX ADMIN — ASPIRIN 81 MG: 81 TABLET, COATED ORAL at 09:09

## 2025-03-26 NOTE — CONSULTS
"Nutrition Services    Patient Name: Lexi Gilbert  YOB: 1950  MRN: [id number]  Admission date: 3/17/2025    Comment:    --Monitor/Encourage PO intake     CLINICAL NUTRITION ASSESSMENT      Reason for Assessment 3/26: Length of stay      H&P      Past Medical History:   Diagnosis Date    Arthritis     right knee, sched scope    Bronchitis, chronic     CVA (cerebral infarction)     5 years ago    Hypertension     Hypothyroidism     IBS (irritable bowel syndrome)     Stroke 12/2011       Past Surgical History:   Procedure Laterality Date    BELPHAROPTOSIS REPAIR Bilateral 2003    HYSTERECTOMY      KNEE ARTHROSCOPY Right 5/20/2016    Procedure: KNEE ARTHROSCOPY debride of mmt  chondraplasty mfc/ lfc and patella;  Surgeon: Danie Crowell MD;  Location: [facility] OR;  Service:     MOUTH SURGERY          Current Problems   Acute infarct of lateral right thalamus  Chronic lacunar infarct of posterior left putamen  -Neurology was following but has signed off   Hypertensive emergency  History of hypertension  Hypothyroidism  History of anxiety  History of depression  Suspected MONIQUE      Encounter Information        Trending Narrative     3/26: Pt was admitted with left sided weakness. Found to have acute infarct of the lateral right thalamus, Neurology was following but has since signed off. Pt was sitting up and eating breakfast at my visit. She reports a good appetite/intake. Pt denies weight loss prior to admission. NFPE completed and not consistent with nutrition diagnosis of malnutrition at this time using AND/ASPEN criteria       Anthropometrics        Current Height, Weight Height: 157.5 cm (62.01\")  Weight: 110 kg (242 lb 8.1 oz) (03/23/25 0827)       Usual Body Weight (UBW) Unknown        Trending Weight Hx     This admission: 3/26: 242# (obtained 3/23)              PTA: 3/26: no recent weight's documented     Wt Readings from Last 30 Encounters:   03/23/25 0827 110 kg (242 lb 8.1 oz)   03/17/25 1028 110 " "kg (243 lb 9.7 oz)   03/17/25 0332 102 kg (225 lb)   05/04/20 0403 104 kg (228 lb 9.9 oz)   05/03/20 1502 104 kg (228 lb 9.9 oz)   05/20/16 0814 95.3 kg (210 lb)   05/19/16 0822 94.3 kg (208 lb)   05/19/16 1414 95.4 kg (210 lb 6.4 oz)   05/13/16 1248 94.8 kg (209 lb)   04/05/16 1542 95.3 kg (210 lb)   03/24/16 0912 96.9 kg (213 lb 9.6 oz)   10/22/14 1512 95.7 kg (211 lb)   04/18/14 0934 94.8 kg (208 lb 15.9 oz)   03/05/14 1506 97.5 kg (214 lb 15.9 oz)   10/09/13 0919 95.7 kg (211 lb)   07/25/13 0843 96.2 kg (211 lb 15.9 oz)   04/24/13 1031 97.1 kg (214 lb)   01/04/13 1511 96.2 kg (211 lb 15.9 oz)      BMI kg/m2 Body mass index is 44.34 kg/m².       Labs        Pertinent Labs No labs today    Results from last 7 days   Lab Units 03/25/25  0000 03/24/25  0004   SODIUM mmol/L 139 139   POTASSIUM mmol/L 3.9 3.5   CHLORIDE mmol/L 103 102   CO2 mmol/L 25.0 26.6   BUN mg/dL 27* 21   CREATININE mg/dL 0.81 0.64   CALCIUM mg/dL 10.4 10.1   GLUCOSE mg/dL 109* 95     Results from last 7 days   Lab Units 03/25/25  0000   HEMOGLOBIN g/dL 14.1   HEMATOCRIT % 45.0     No results found for: \"HGBA1C\"     Medications    Scheduled Medications amLODIPine, 10 mg, Oral, Daily  aspirin, 81 mg, Oral, Daily  chlorthalidone, 25 mg, Oral, Daily  clopidogrel, 75 mg, Oral, Daily  [Held by provider] enoxaparin sodium, 40 mg, Subcutaneous, Q24H  escitalopram, 20 mg, Oral, Daily  hydrALAZINE, 25 mg, Oral, Q8H  levothyroxine, 175 mcg, Oral, Q AM  losartan, 100 mg, Oral, Q24H  sodium chloride, 10 mL, Intravenous, Q12H        Infusions      PRN Medications   acetaminophen **OR** [DISCONTINUED] acetaminophen **OR** [DISCONTINUED] acetaminophen    senna-docusate sodium **AND** polyethylene glycol **AND** bisacodyl **AND** bisacodyl    diphenhydrAMINE-zinc acetate    ipratropium-albuterol    melatonin    ondansetron    [COMPLETED] Insert Peripheral IV **AND** sodium chloride    sodium chloride    sodium chloride     Physical Findings        Trending " Physical   Appearance, NFPE 3/26: NFPE completed and not consistent with nutrition diagnosis of malnutrition at this time using AND/ASPEN criteria     --  Edema    1+ ankles    Bowel Function   BM 3/24, pt is utilizing PRN stool softeners    Tubes   No feeding tube    Chewing/Swallowing   No issues reported    Skin   Rash noted    --  Current Nutrition Orders & Evaluation of Intake       Oral Nutrition     Food Allergies NKFA   Current PO Diet Diet: Cardiac; Healthy Heart (2-3 Na+); Fluid Consistency: Thin (IDDSI 0)   Supplement None ordered   PO Evaluation     Trending % PO Intake 3/26: %    --  Nutritional Risk Screening        NRS-2002 Score          Nutrition Diagnosis         Nutrition Dx Problem 1   No nutrition diagnosis     Nutrition Dx Problem 2        Intervention Goal         Intervention Goal(s) To continue with % of meals      Nutrition Intervention        RD Action NFPE complete, encourage PO intake      Nutrition Prescription          Diet Prescription Cardiac    Supplement Prescription None    --  Monitor/Evaluation        Monitor PO intake         Electronically signed by:  Ericka Plascencia RD  03/26/25 09:16 EDT

## 2025-03-26 NOTE — OUTREACH NOTE
Prep Survey      Flowsheet Row Responses   [facility] patient discharged from? [facility]   Is LACE score < 7 ? No   Eligibility Readm Mgmt   Discharge diagnosis Accelerated hypertension   Does the patient have one of the following disease processes/diagnoses(primary or secondary)? Other   Does the patient have Home health ordered? No   Is there a DME ordered? No   Prep survey completed? Yes            SHANITA CONWAY - Registered Nurse

## 2025-03-26 NOTE — DISCHARGE SUMMARY
"[facility] Medicine Services  Discharge Summary    Date of Service: 3/26/25  Patient Name: Lexi Gilbert  : 1950  MRN: [id number]    Date of Admission: 3/17/2025  Discharge Diagnosis: Acute infarct of lateral right thalamus  Date of Discharge:  3/26/25  Primary Care Physician: Leandra Pettit MD    Presenting Problem:   Weakness [R53.1]  Accelerated hypertension [I10]  Fall, initial encounter [W19.XXXA]    Active and Resolved Hospital Problems:  Acute infarct of lateral right thalamus  Chronic lacunar infarct of posterior left putamen  Hypertensive emergency  History of hypertension  Hypothyroidism  History of anxiety  History of depression  Suspected obstructive sleep apnea    Hospital Course     HPI:  Per the H&P written by Linh Adkins, dated 3/17/25:  \"Lexi Gilbert is a 74 y.o. female with a CMH of hypothyroidism, anxiety/depression, hx of stroke ~15 years ago who presented to [facility] on 3/17/2025 with  left-sided weakness.     Patient presented to the emergency department early this morning with complaint of left-sided weakness which began around 4 AM.  She states that she was watching TV when all of a sudden her left arm felt \"funny\", weak and slightly numb.  She also began having slight dizziness.  She stood up to go lay down in her bed but fell onto the bed and slid to the floor.  She states she feels like she fell because she was weak, particularly in her left leg and was off balance.  She could not get herself up. She attempted to call her son who lives with her but did not get an answer therefore she called EMS.  She says that she was not having any trouble using her phone.  She still feels slightly weak in the left side of her body.  She does not have dizziness any longer.  She denies any chest pain, shortness of breath, slurred speech, dysphagia, vision changes, syncope, near syncope, headache, fever, chills, GI symptoms, urinary symptoms, peripheral edema or other " "acute symptoms at this time.  She states her blood pressure runs SBP 140s to 150s at home and was surprised that her blood pressure was extremely elevated when EMS checked it. She is not on any antihypertensives or aspirin at home.      On arrival to the ED, her blood pressure was noted to be 220/120, heart rate 83, 96% on room air.  Labs remarkable for troponin 21, repeat 31, glucose 118.  Chest x-ray showed minor linear atelectasis in the left midlung.  CT head showed no acute intracranial abnormality, moderate chronic small vessel ischemic change and chronic lacunar infarcts in the left putamen.  She was given IV Labetalol.  She is being admitted for further management.\"    Hospital Course:  Patient admitted as above. Neurology consulted. Patient planned for DAPT for 21 days followed by aspirin monotherapy. BP controlled with anti hypertensive agents. Will need cardiac monitoring placed prior to discharge and outpatient follow up with stroke clinic. Worked with physical therapy with recommendations for acute inpatient rehab. Medically stable for discharge at this time.     DISCHARGE Follow Up Recommendations for labs and diagnostics:   - Follow up with PCP within 3 days of discharge  - Follow up with stroke clinic within 1 month of discharge  - MCOT placed prior to discharge, will need follow up evaluation  - Recommend outpatient sleep study per PCP    Day of Discharge     Vital Signs:  Temp:  [97.3 °F (36.3 °C)-98.1 °F (36.7 °C)] 98 °F (36.7 °C)  Heart Rate:  [55-67] 55  Resp:  [19-26] 21  BP: (110-133)/(50-72) 133/55    Physical Exam:   General: No acute distress, alert and oriented  CV: RRR, no peripheral edema  Pulm: No increased work of breathing  Psych: Appropriate mood and affect    Pertinent  and/or Most Recent Results     LAB RESULTS:      Lab 03/25/25  0000 03/24/25  0004   WBC 6.33 7.48   HEMOGLOBIN 14.1 14.7   HEMATOCRIT 45.0 46.3   PLATELETS 280 289   MCV 92.6 91.3         Lab 03/25/25  0000 " 03/24/25  0004   SODIUM 139 139   POTASSIUM 3.9 3.5   CHLORIDE 103 102   CO2 25.0 26.6   ANION GAP 11.0 10.4   BUN 27* 21   CREATININE 0.81 0.64   EGFR 75.3 91.7   GLUCOSE 109* 95   CALCIUM 10.4 10.1                         Brief Urine Lab Results       None          Microbiology Results (last 10 days)       ** No results found for the last 240 hours. **          XR Spine Lumbar 1 View  Result Date: 3/18/2025  Impression: Impression: Only a single frontal view was obtained of the lumbar spine. Recommend lateral view. Electronically Signed: Phoebe Badillo  3/18/2025 10:52 PM EDT  Workstation ID: [id number]    Adult Transthoracic Echo Complete W/ Cont if Necessary Per Protocol  Addendum Date: 3/17/2025    Left ventricular systolic function is normal. Calculated left ventricular EF = 60% Left ventricular ejection fraction appears to be 56 - 60%.   Left ventricular diastolic function is consistent with (grade I) impaired relaxation.  Average GLS -13.5%.   Estimated right ventricular systolic pressure from tricuspid regurgitation is normal (<35 mmHg).   There is a small (<1cm) pericardial effusion. There is no evidence of cardiac tamponade.   No significant valvular abnormalities noted.     MRI Brain Without Contrast  Result Date: 3/17/2025  Impression: Impression: 1. Small punctate area of acute ischemia at junction of lateral right thalamus and posterior limb of right internal capsule. 2. Extensive white matter findings most compatible with moderate to severe chronic microvascular disease. Electronically Signed: Mejia Adams MD  3/17/2025 4:08 PM EDT  Workstation ID: [id number]    CT Angiogram Carotids  Result Date: 3/17/2025  Impression: 1.No large vessel occlusive thrombus, flow-limiting stenosis or aneurysm is seen intracranially. 2.Focal high-grade stenosis is suggested within the distal right vertebral artery at the level of the skull base just proximal to the basilar artery. 3.Mild to moderate short segment stenosis  is suggested within a right M2 insular branch. Electronically Signed: Naty Haynes MD  3/17/2025 1:55 PM EDT  Workstation ID: [id number]    CT Angiogram Head  Result Date: 3/17/2025  Impression: 1.No large vessel occlusive thrombus, flow-limiting stenosis or aneurysm is seen intracranially. 2.Focal high-grade stenosis is suggested within the distal right vertebral artery at the level of the skull base just proximal to the basilar artery. 3.Mild to moderate short segment stenosis is suggested within a right M2 insular branch. Electronically Signed: Naty Haynes MD  3/17/2025 1:55 PM EDT  Workstation ID: [id number]    CT Head Without Contrast  Result Date: 3/17/2025  Impression: Impression: 1.No acute intracranial abnormality. 2.Moderate chronic small vessel ischemic change and chronic lacunar infarct in the left putamen. 3.Small mastoid effusions. Electronically Signed: Femi Infante MD  3/17/2025 5:12 AM EDT  Workstation ID: [id number]    XR Chest 1 View  Result Date: 3/17/2025  Impression: Impression: Minor linear atelectasis in the left midlung. Electronically Signed: Femi Infante MD  3/17/2025 4:19 AM EDT  Workstation ID: [id number]        Results for orders placed during the hospital encounter of 03/17/25    Adult Transthoracic Echo Complete W/ Cont if Necessary Per Protocol    Interpretation Summary    Left ventricular systolic function is normal. Calculated left ventricular EF = 60% Left ventricular ejection fraction appears to be 56 - 60%.    Left ventricular diastolic function is consistent with (grade I) impaired relaxation.  Average GLS -13.5%.    Estimated right ventricular systolic pressure from tricuspid regurgitation is normal (<35 mmHg).    There is a small (<1cm) pericardial effusion. There is no evidence of cardiac tamponade.    No significant valvular abnormalities noted.      Labs Pending at Discharge:   Pending Results       Procedure [Order ID] Specimen - Date/Time    Cardiac Event Monitor (KELSIE)  or Mobile Cardiac Outpatient Telemetry (MCT) [[id number]]             Procedures Performed   None       Consults:   Consults       Date and Time Order Name Status Description    3/17/2025 11:34 AM Inpatient Neurology Consult Stroke Completed     3/17/2025  5:35 AM Hospitalist (on-call MD unless specified)              Discharge Details        Discharge Medications        New Medications        Instructions Start Date   amLODIPine 10 MG tablet  Commonly known as: NORVASC   10 mg, Oral, Daily   Start Date: March 27, 2025     aspirin 81 MG EC tablet   81 mg, Oral, Daily   Start Date: March 27, 2025     chlorthalidone 25 MG tablet  Commonly known as: HYGROTON   25 mg, Oral, Daily   Start Date: March 27, 2025     clopidogrel 75 MG tablet  Commonly known as: PLAVIX   75 mg, Oral, Daily   Start Date: March 27, 2025     hydrALAZINE 25 MG tablet  Commonly known as: APRESOLINE   25 mg, Oral, Every 8 Hours Scheduled      losartan 100 MG tablet  Commonly known as: COZAAR   100 mg, Oral, Every 24 Hours Scheduled   Start Date: March 27, 2025            Continue These Medications        Instructions Start Date   escitalopram 20 MG tablet  Commonly known as: LEXAPRO   20 mg, Daily      levothyroxine 175 MCG tablet  Commonly known as: SYNTHROID, LEVOTHROID   TAKE ONE TABLET BY MOUTH DAILY               No Known Allergies    Discharge Disposition:   IRF    Diet:  Heart healthy    Discharge Activity:   As tolerated    CODE STATUS:  Code Status and Medical Interventions: CPR (Attempt to Resuscitate); Full Support   Ordered at: 03/17/25 0559     Code Status (Patient has no pulse and is not breathing):    CPR (Attempt to Resuscitate)     Medical Interventions (Patient has pulse or is breathing):    Full Support       No future appointments.    Additional Instructions for the Follow-ups that You Need to Schedule       Ambulatory Referral to Neurology   As directed      Hospital follow up    Order Comments: Hospital follow up    Requested  service: [facility]        Discharge Follow-up with PCP   As directed       Currently Documented PCP:    Leandra Pettit MD    PCP Phone Number:    [phone]     Follow Up Details: Follow up with PCP within 3 days of discharge                Time spent on Discharge including face to face service:  >30 minutes    Signature: Electronically signed by Ro Soliman MD, 03/26/25, 14:58 EDT.  [facility] Hospitalist Team

## 2025-03-26 NOTE — PLAN OF CARE
Goal Outcome Evaluation:              Outcome Evaluation: Pt c/o itching benadryl itch cream placed on patient. Pt is currently resting well at this time.

## 2025-03-26 NOTE — SIGNIFICANT NOTE
03/26/25 1643   OTHER   Discipline physical therapist   Rehab Time/Intention   Session Not Performed patient/family declined treatment  (1st attempt, pt declined due to not sleeping well the evening prior & 2nd attempt, pt declined due to hopefully d/cing to IRF today)   Recommendation   PT - Next Appointment 03/27/25

## 2025-03-27 NOTE — CASE MANAGEMENT/SOCIAL WORK
Case Management Discharge Note      Final Note: [facility] - Discharged on 3/26/2025 Admission date: 3/17/2025 - Discharge disposition: Home or Self Care      Destination Coordination complete.      Service Provider Services Address Phone Fax Patient Preferred    [facility] [address] [place] [address] [place] [address] [phone] --                 Transportation Services  W/C Van: Skilled Nursing Facility Van    Final Discharge Disposition Code: 62 - inpatient rehab facility

## 2025-03-28 NOTE — PAYOR COMM NOTE
"  DISCHARGE NOTICE --  [id number]       This patient discharged  [facility] on 3/26/25.  Please advise if additional information is needed to finalize this request.    Thank you!      Leni Malagon  Utilization Review Coordinator  [facility]  [address] [place] [address]  [address]  Ph: [phone]  Fx: [phone]      Lexi Gilbert (74 y.o. Female)       Date of Birth   1950    Social Security Number       Address   [address] [place] [address] [place] [address]    Home Phone   [phone]    MRN   [id number]       Oriental orthodox   Orthodox    Marital Status                               Admission Date   3/17/2025    Admission Type   Emergency    Admitting Provider   Ro Soliman MD    Attending Provider       Department, Room/Bed   [facility] NEURO, 249/1       Discharge Date   3/26/2025    Discharge Disposition   Home or Self Care    Discharge Destination                                 Attending Provider: (none)   Allergies: No Known Allergies    Isolation: None   Infection: None   Code Status: Prior    Ht: 157.5 cm (62.01\")   Wt: 110 kg (242 lb 8.1 oz)    Admission Cmt: None   Principal Problem: Accelerated hypertension [I10]                   Active Insurance as of 3/17/2025       Primary Coverage       Payor Plan Insurance Group Employer/Plan Group    [employer] MEDICARE REPLACEMENT [place] [place]IC[place] [id number]       Payor Plan Address Payor Plan Phone Number Payor Plan Fax Number Effective Dates    [address] [phone]  1/1/2024 - None Entered    [place] [id number]         Subscriber Name Subscriber Birth Date Member ID       LEXI GILBERT 1950 [id number]               Secondary Coverage       Payor Plan Insurance Group Employer/Plan Group     FOR LIFE  FOR LIFE MC SUP         Payor Plan Address Payor Plan Phone Number Payor Plan Fax Number Effective Dates    [address] [phone]  5/3/2020 - None Entered    " [facility] [id number]         Subscriber Name Subscriber Birth Date Member ID       SARAH CROSS 1950 [id number]                     Emergency Contacts        (Rel.) Home Phone Work Phone Mobile Phone    VladimirIsrael (Son) [phone] -- --    VLADIMIRCHENG (Son) -- -- [phone]

## 2025-04-01 ENCOUNTER — READMISSION MANAGEMENT (OUTPATIENT)
Dept: CALL CENTER | Facility: HOSPITAL | Age: 75
End: 2025-04-01
Payer: OTHER GOVERNMENT

## 2025-04-01 LAB
QT INTERVAL: 505 MS
QTC INTERVAL: 496 MS

## 2025-04-01 NOTE — OUTREACH NOTE
Medical Week 1 Survey      Flowsheet Row Responses   [facility] facility patient discharged from? [facility]   Does the patient have one of the following disease processes/diagnoses(primary or secondary)? Other   Week 1 attempt successful? No   Unsuccessful attempts Attempt 1            MAGNOLIA PHAN - Registered Nurse

## 2025-04-02 ENCOUNTER — TELEPHONE (OUTPATIENT)
Dept: NEUROLOGY | Facility: CLINIC | Age: 75
End: 2025-04-02
Payer: OTHER GOVERNMENT

## 2025-04-02 NOTE — TELEPHONE ENCOUNTER
Caller: Lexi Gilbert    Relationship to patient: Self    Best call back number: [phone]      New or established patient?  [x] New  [] Established    Date of discharge: [facility] 03/26, [facility] 04[facility]03    Facility discharged from: [facility]    Diagnosis/Symptoms: STROKE    Length of stay (If applicable): 9 DAYS, 7 DAYS    Specialty Only: Did you see a [facility] provider?    [x] Yes  [] No  If so, who? DR. HUDSON    Additional Details: HOSP F/U APPT IN D/C INDICATES 1 MONTH, HOWEVER NEXT AVAILABLE IS JULY, PT SCHEDULED FOR 07/21 AND ADDED TO WAITLIST.    PLEASE REVIEW AND CONTACT PT W/HOSP F/U APPT

## 2025-04-07 NOTE — TELEPHONE ENCOUNTER
Pt added to office wait list. Will call with any options if we have cancellations for sooner appt

## 2025-04-08 ENCOUNTER — READMISSION MANAGEMENT (OUTPATIENT)
Dept: CALL CENTER | Facility: HOSPITAL | Age: 75
End: 2025-04-08
Payer: OTHER GOVERNMENT

## 2025-04-08 NOTE — OUTREACH NOTE
Medical Week 2 Survey      Flowsheet Row Responses   [facility] facility patient discharged from? [facility]   Does the patient have one of the following disease processes/diagnoses(primary or secondary)? Other   Week 2 attempt successful? No   Unsuccessful attempts Attempt 1            Imani WAY - Registered Nurse

## 2025-04-16 ENCOUNTER — READMISSION MANAGEMENT (OUTPATIENT)
Dept: CALL CENTER | Facility: HOSPITAL | Age: 75
End: 2025-04-16
Payer: OTHER GOVERNMENT

## 2025-04-16 LAB
CV ZIO BASELINE AVG BPM: 65 BPM
CV ZIO BASELINE BPM HIGH: 148 BPM
CV ZIO BASELINE BPM LOW: 48 BPM
CV ZIO DEVICE ANALYSIS TIME: NORMAL
CV ZIO ECT SVE COUNT: 1184 EPISODES
CV ZIO ECT SVE CPLT COUNT: 54 EPISODES
CV ZIO ECT SVE CPLT FREQ: NORMAL
CV ZIO ECT SVE FREQ: NORMAL
CV ZIO ECT SVE TPLT COUNT: 16 EPISODES
CV ZIO ECT SVE TPLT FREQ: NORMAL
CV ZIO ECT VE COUNT: 243 EPISODES
CV ZIO ECT VE CPLT COUNT: 0 EPISODES
CV ZIO ECT VE CPLT FREQ: 0
CV ZIO ECT VE FREQ: NORMAL
CV ZIO ECT VE TPLT COUNT: 0 EPISODES
CV ZIO ECT VE TPLT FREQ: 0
CV ZIO ECTOPIC SVE COUPLET RAW PERCENT: 0.01 %
CV ZIO ECTOPIC SVE ISOLATED PERCENT: 0.09 %
CV ZIO ECTOPIC SVE TRIPLET RAW PERCENT: 0 %
CV ZIO ECTOPIC VE COUPLET RAW PERCENT: 0 %
CV ZIO ECTOPIC VE ISOLATED PERCENT: 0.02 %
CV ZIO ECTOPIC VE TRIPLET RAW PERCENT: 0 %
CV ZIO ENROLLMENT END: NORMAL
CV ZIO ENROLLMENT START: NORMAL
CV ZIO PATIENT EVENTS DIARIES: 0
CV ZIO PATIENT EVENTS TRIGGERS: 1
CV ZIO PAUSE COUNT: 0
CV ZIO PRESCRIPTION STATUS: NORMAL
CV ZIO SVT AVG BPM: 115 BPM
CV ZIO SVT BPM HIGH: 148 BPM
CV ZIO SVT BPM LOW: 83 BPM
CV ZIO SVT COUNT: 11
CV ZIO SVT F EPI AVG BPM: 132 BPM
CV ZIO SVT F EPI BEATS: 13 BEATS
CV ZIO SVT F EPI BPM HIGH: 148 BPM
CV ZIO SVT F EPI BPM LOW: 113 BPM
CV ZIO SVT F EPI DUR: 6.4 SEC
CV ZIO SVT F EPI END: NORMAL
CV ZIO SVT F EPI START: NORMAL
CV ZIO SVT L EPI AVG BPM: 98 BPM
CV ZIO SVT L EPI BEATS: 11 BEATS
CV ZIO SVT L EPI BPM HIGH: 109 BPM
CV ZIO SVT L EPI BPM LOW: 91 BPM
CV ZIO SVT L EPI DUR: 7.2 SEC
CV ZIO SVT L EPI END: NORMAL
CV ZIO SVT L EPI START: NORMAL
CV ZIO TOTAL  ENROLLMENT PERIOD: NORMAL
CV ZIO VT COUNT: 0

## 2025-04-16 NOTE — OUTREACH NOTE
Medical Week 3 Survey      Flowsheet Row Responses   [facility] patient discharged from? [facility]   Does the patient have one of the following disease processes/diagnoses(primary or secondary)? Other   Week 3 attempt successful? No   Unsuccessful attempts Attempt 1   Revoke Decline to participate  [No answer x 3 attempts.]            Lizzette COHEN - Registered Nurse